# Patient Record
Sex: MALE | Race: WHITE | NOT HISPANIC OR LATINO | Employment: FULL TIME | URBAN - METROPOLITAN AREA
[De-identification: names, ages, dates, MRNs, and addresses within clinical notes are randomized per-mention and may not be internally consistent; named-entity substitution may affect disease eponyms.]

---

## 2017-10-31 ENCOUNTER — OFFICE VISIT (OUTPATIENT)
Dept: PRIMARY CARE CLINIC | Facility: CLINIC | Age: 32
End: 2017-10-31
Payer: COMMERCIAL

## 2017-10-31 VITALS
HEIGHT: 70 IN | SYSTOLIC BLOOD PRESSURE: 138 MMHG | DIASTOLIC BLOOD PRESSURE: 106 MMHG | BODY MASS INDEX: 44.06 KG/M2 | WEIGHT: 307.75 LBS | HEART RATE: 76 BPM | TEMPERATURE: 98 F

## 2017-10-31 DIAGNOSIS — R30.0 BURNING WITH URINATION: Primary | ICD-10-CM

## 2017-10-31 LAB
BILIRUB UR QL STRIP: NEGATIVE
CLARITY UR: CLEAR
COLOR UR: YELLOW
GLUCOSE UR QL STRIP: NEGATIVE
HGB UR QL STRIP: NEGATIVE
KETONES UR QL STRIP: NEGATIVE
LEUKOCYTE ESTERASE UR QL STRIP: NEGATIVE
NITRITE UR QL STRIP: NEGATIVE
PH UR STRIP: 7 [PH] (ref 5–8)
PROT UR QL STRIP: NEGATIVE
SP GR UR STRIP: 1.01 (ref 1–1.03)
URN SPEC COLLECT METH UR: NORMAL

## 2017-10-31 PROCEDURE — 99202 OFFICE O/P NEW SF 15 MIN: CPT | Mod: S$GLB,,, | Performed by: NURSE PRACTITIONER

## 2017-10-31 PROCEDURE — 87086 URINE CULTURE/COLONY COUNT: CPT

## 2017-10-31 PROCEDURE — 99999 PR PBB SHADOW E&M-NEW PATIENT-LVL IV: CPT | Mod: PBBFAC,,, | Performed by: NURSE PRACTITIONER

## 2017-10-31 PROCEDURE — 81003 URINALYSIS AUTO W/O SCOPE: CPT | Mod: PO

## 2017-10-31 RX ORDER — FLUTICASONE PROPIONATE 50 MCG
2 SPRAY, SUSPENSION (ML) NASAL DAILY
COMMUNITY
Start: 2017-06-29 | End: 2018-05-25

## 2017-10-31 RX ORDER — PHENAZOPYRIDINE HYDROCHLORIDE 200 MG/1
200 TABLET, FILM COATED ORAL 3 TIMES DAILY PRN
Qty: 12 TABLET | Refills: 0 | Status: SHIPPED | OUTPATIENT
Start: 2017-10-31 | End: 2017-11-10

## 2017-10-31 RX ORDER — LOVASTATIN 20 MG/1
20 TABLET ORAL DAILY
COMMUNITY
Start: 2017-06-30 | End: 2018-05-25

## 2017-10-31 RX ORDER — CETIRIZINE HYDROCHLORIDE 10 MG/1
10 TABLET ORAL DAILY
COMMUNITY
End: 2018-01-24

## 2017-10-31 RX ORDER — AZELASTINE 1 MG/ML
1 SPRAY, METERED NASAL 2 TIMES DAILY
COMMUNITY
Start: 2017-07-31 | End: 2018-05-25

## 2017-10-31 NOTE — MEDICAL/APP STUDENT
Subjective:       Patient ID: Karlos Clark is a 32 y.o. male.    Chief Complaint: Urinary Tract Infection (burning )    HPI     Presents to the clinic with c/o urinary burning, urgency, and frequency.  Sx began yesterday and have worsened since onset. Denies foul odor to urine, back pain, bladder pain or penile discharge.  Reports he is drinking plenty of water. He has not taken any medication for his current sx. Denies aggravating or alleviating factors.     Review of Systems   Constitutional: Negative for chills and fever.   HENT: Positive for congestion.    Respiratory: Negative for cough and shortness of breath.    Cardiovascular: Negative for chest pain and palpitations.   Gastrointestinal: Negative for abdominal pain, constipation, diarrhea, nausea and vomiting.   Genitourinary: Positive for dysuria, frequency and urgency. Negative for decreased urine volume, discharge, flank pain, hematuria, penile pain, penile swelling, scrotal swelling and testicular pain.   Skin: Negative for rash and wound.   Allergic/Immunologic: Positive for environmental allergies.   Neurological: Negative for dizziness, light-headedness and headaches.       Objective:      Physical Exam   Constitutional: He is oriented to person, place, and time. He appears well-developed and well-nourished. No distress.   HENT:   Head: Normocephalic and atraumatic.   Eyes: EOM are normal. Pupils are equal, round, and reactive to light. Right eye exhibits no discharge. Left eye exhibits no discharge.   Neck: Normal range of motion. Neck supple.   Cardiovascular: Normal rate and regular rhythm.    No murmur heard.  Pulmonary/Chest: Breath sounds normal. He has no wheezes. He has no rales.   Abdominal: Soft. Bowel sounds are normal. He exhibits no distension and no mass. There is tenderness (LLQ with deep palpation). There is no rebound and no guarding. No hernia.   Musculoskeletal: Normal range of motion.   Neurological: He is alert and oriented to  person, place, and time.   Skin: Skin is warm and dry.   Psychiatric: He has a normal mood and affect. His behavior is normal.       Assessment:       1. Urinary tract infection without hematuria, site unspecified    2. Burning with urination        Plan:         Burning with urination  -     URINALYSIS-- Negative   -     Urine culture; Pending  -    Rx:  phenazopyridine (PYRIDIUM) 200 MG tablet; Take 1 tablet (200 mg total) by mouth 3 (three) times daily as needed for Pain.   - Take pyridium for sx relief.   -  Increase fluid intake.  -  Call or RTC if sx do not improve or worsen in 3-5 days, may require Urology referral.

## 2017-10-31 NOTE — PATIENT INSTRUCTIONS
The urine does not show any infection.  We will call you with the results.    Increase fluids.  Take the pyridium for symptom relief.    If you have any questions, please call.  You can reach us at [phone] Tuesday through Friday (except holidays) 10 nooon to 6 p.m.    Thank you for using the [facility]!    Hawa Lui, APRN, CNP, FNP-BC  [facility]  [facility]      Dysuria with Uncertain Cause (Adult)    The urethra is the tube that allows urine to pass out of the body. In a woman, the urethra is the opening above the vagina. In men, the urethra is the opening on the tip of the penis. Dysuria is the feeling of pain or burning in the urethra when passing urine.  Dysuria can be caused by anything that irritates or inflames the urethra. An infection or chemical irritation can cause this reaction. A bladder infection is the most common cause of dysuria in adults. A urine test can diagnose this. A bladder infection needs antibiotic treatment.  Soaps, lotions, colognes and feminine hygiene products can cause dysuria. So can birth control jellies, creams, and foams. It will go away 1 to 3 days after using these irritants.  Sexually transmitted diseases (STDs) such as chlamydia or gonorrhea can cause dysuria. Your healthcare provider may take a culture sample. Your provider may start you on antibiotic medicine before the culture test returns.  In women who have gone through menopause, dysuria can be from dryness in the lining of the urethra. This can be treated with hormones. Dysuria becomes long-term (chronic) when it lasts for weeks or months. You may need to see a specialist (urologist) to diagnose and treat chronic dysuria.  Home care  These home care tips may help:  · Don't use any chemicals or products that you think may be causing your symptoms.  · If you were given a prescription medicine, take as directed. Be sure to take it until it is all used up.  · If a culture was taken, don't  have sex until you have been told that it is negative. This means you don't have an infection. Then follow your healthcare provider's advice to treat your condition.  If a culture was done and it is positive:  · Both you and your sexual partner may need to be treated. This is true even if your partner has no symptoms.  · Contact your healthcare provider or go to an urgent care clinic or the public health department to be looked at and treated.  · Don't have sex until both you and your partner(s) have finished all antibiotics and your healthcare provider says you are no longer contagious.  · Learn about and use safe sex practices. The safest sex is with a partner who has tested negative and only has sex with you. Condoms can prevent STDs from spreading, but they aren't a guarantee.  Follow-up care  Follow up with your healthcare provider, or as advised. If a culture was taken, you may call as directed for the results. If you have an STD, follow up with your provider or the public health department for a complete STD screening, including HIV testing. For more information, contact CDC-INFO at 381-641-8929.  When to seek medical advice  Call your healthcare provider right away if any of these occur:  · You aren't better after 3 days of treatment  · Fever of 100.4ºF (38ºC) or higher, or as directed by your healthcare provider  · Back or belly pain that gets worse  · You can't urinate because of pain  · New discharge from the urethra, vagina, or penis  · Painful sores on the penis  · Rash or joint pain  · Painful lumps (lymph nodes) in the groin  · Testicle pain or swelling of the scrotum  Date Last Reviewed: 11/1/2016  © 9475-9187 [employer]. [address] [place] [address], [place] [address]. All rights reserved. This information is not intended as a substitute for professional medical care. Always follow your healthcare professional's instructions.

## 2017-10-31 NOTE — PROGRESS NOTES
Subjective:       Patient ID: Karlos Clark is a 32 y.o. male.     Chief Complaint: Urinary Tract Infection (burning )     HPI      Presents to the clinic as a new patient to me with c/o urinary burning, urgency, and frequency.  Sx began yesterday and have worsened since onset. Denies foul odor to urine, back pain, bladder pain or penile discharge.  Reports he is drinking plenty of water. He has not taken any medication for his current sx. Denies aggravating or alleviating factors.      Review of Systems   Constitutional: Negative for chills and fever.   HENT: Positive for congestion.    Respiratory: Negative for cough and shortness of breath.    Cardiovascular: Negative for chest pain and palpitations.   Gastrointestinal: Negative for abdominal pain, constipation, diarrhea, nausea and vomiting.   Genitourinary: Positive for dysuria, frequency and urgency. Negative for decreased urine volume, discharge, flank pain, hematuria, penile pain, penile swelling, scrotal swelling and testicular pain.   Skin: Negative for rash and wound.   Allergic/Immunologic: Positive for environmental allergies.   Neurological: Negative for dizziness, light-headedness and headaches.       Objective:      Physical Exam   Constitutional: He is oriented to person, place, and time. He appears well-developed and well-nourished. No distress.   HENT:   Head: Normocephalic and atraumatic.   Eyes: EOM are normal. Pupils are equal, round, and reactive to light. Right eye exhibits no discharge. Left eye exhibits no discharge.   Neck: Normal range of motion. Neck supple.   Cardiovascular: Normal rate and regular rhythm.    No murmur heard.  Pulmonary/Chest: Breath sounds normal. He has no wheezes. He has no rales.   Abdominal: Soft. Bowel sounds are normal. He exhibits no distension and no mass. There is tenderness (LLQ with deep palpation). There is no rebound and no guarding. No hernia.   Musculoskeletal: Normal range of motion.   Neurological: He  "is alert and oriented to person, place, and time.   Skin: Skin is warm and dry.   Psychiatric: He has a normal mood and affect. His behavior is normal.     Burning with urination  -     URINALYSIS  -     Urine culture; Future; Expected date: 10/31/2017  -     phenazopyridine (PYRIDIUM) 200 MG tablet; Take 1 tablet (200 mg total) by mouth 3 (three) times daily as needed for Pain.  Dispense: 12 tablet; Refill: 0      Pt today presents with 2 days of dysuria, freq and urgency.  H/o several UTIs over the years and had bladder procedure ("stretched") at age 7 r/t difficulty holding his urine.  Not presently sexually active, states he uses condoms with every encounter.   No penile d/c or pain/swelling.    This is a new problem to me and the following work up is planned.    Lab & Radiological Tests Ordered: urinalysis.  The results are normal.  Urine culture results pending..    Pt advised to perform comfort measures recommended on patient instruction sheet .    If not better in 2-3 days, the patient is advised to call us.  If worse or concerns, the patient is advised to call us.  Explained exam findings, diagnosis and treatment plan to patient.  Questions answered and patient states understanding.      "

## 2017-11-01 LAB — BACTERIA UR CULT: NO GROWTH

## 2017-11-29 ENCOUNTER — OFFICE VISIT (OUTPATIENT)
Dept: FAMILY MEDICINE | Facility: CLINIC | Age: 32
End: 2017-11-29
Payer: COMMERCIAL

## 2017-11-29 VITALS
BODY MASS INDEX: 45.65 KG/M2 | DIASTOLIC BLOOD PRESSURE: 78 MMHG | WEIGHT: 315 LBS | SYSTOLIC BLOOD PRESSURE: 140 MMHG | TEMPERATURE: 98 F | HEART RATE: 103 BPM | OXYGEN SATURATION: 97 %

## 2017-11-29 DIAGNOSIS — J32.0 CHRONIC MAXILLARY SINUSITIS: Primary | ICD-10-CM

## 2017-11-29 PROCEDURE — 99213 OFFICE O/P EST LOW 20 MIN: CPT | Mod: S$GLB,,, | Performed by: PHYSICIAN ASSISTANT

## 2017-11-29 PROCEDURE — 99999 PR PBB SHADOW E&M-EST. PATIENT-LVL III: CPT | Mod: PBBFAC,,, | Performed by: PHYSICIAN ASSISTANT

## 2017-11-29 RX ORDER — METHYLPREDNISOLONE 4 MG/1
TABLET ORAL
Qty: 1 PACKAGE | Refills: 0 | Status: SHIPPED | OUTPATIENT
Start: 2017-11-29 | End: 2017-12-13 | Stop reason: ALTCHOICE

## 2017-11-29 RX ORDER — CEFDINIR 300 MG/1
300 CAPSULE ORAL 2 TIMES DAILY
Qty: 20 CAPSULE | Refills: 0 | Status: SHIPPED | OUTPATIENT
Start: 2017-11-29 | End: 2017-12-09

## 2017-11-29 NOTE — PROGRESS NOTES
Subjective:       Patient ID: Karlos Clark is a 32 y.o. male.    Chief Complaint: Sore Throat (x 3 days no fever )    HPI   Completed Augmentin x 1 wk (2 wks ago)  Hx recurrent sinusitis  All previous CT's of sinuses confirms sinusitis  Current sinus sx x 5 or 6 wks  Review of Systems   Constitutional: Negative for activity change, appetite change, chills, diaphoresis, fatigue, fever and unexpected weight change.   HENT: Positive for congestion, postnasal drip, sinus pain, sinus pressure and sore throat.    Eyes: Negative.    Respiratory: Positive for cough. Negative for shortness of breath and wheezing.    Cardiovascular: Negative.  Negative for chest pain and leg swelling.   Gastrointestinal: Negative.    Endocrine: Negative.    Genitourinary: Negative.    Musculoskeletal: Negative.    Skin: Negative.  Negative for rash.       Objective:      Physical Exam   Constitutional: He appears well-developed and well-nourished. No distress.   HENT:   Head: Normocephalic and atraumatic.   Right Ear: External ear normal.   Left Ear: External ear normal.   Nose: Nose normal.   Mouth/Throat: Oropharynx is clear and moist. No oropharyngeal exudate.   R max sinus fullness and tenderness  Mucus clear but nasal membranes inflamed    Eyes: Conjunctivae are normal. No scleral icterus.   Neck: Normal range of motion. Neck supple. No tracheal deviation present. No thyromegaly present.   Cardiovascular: Normal rate, regular rhythm, normal heart sounds and intact distal pulses.  Exam reveals no gallop and no friction rub.    No murmur heard.  Pulmonary/Chest: Effort normal and breath sounds normal. No respiratory distress. He has no wheezes. He has no rales.   Musculoskeletal: He exhibits no edema.   Lymphadenopathy:     He has no cervical adenopathy.   Skin: Skin is warm and dry. No rash noted.   Vitals reviewed.      Assessment:       1. Chronic maxillary sinusitis        Plan:       Karlos was seen today for sore  throat.    Diagnoses and all orders for this visit:    Chronic maxillary sinusitis  -     cefdinir (OMNICEF) 300 MG capsule; Take 1 capsule (300 mg total) by mouth 2 (two) times daily.  -     methylPREDNISolone (MEDROL DOSEPACK) 4 mg tablet; use as directed    discussed otc's

## 2017-12-13 ENCOUNTER — LAB VISIT (OUTPATIENT)
Dept: LAB | Facility: HOSPITAL | Age: 32
End: 2017-12-13
Attending: ALLERGY & IMMUNOLOGY
Payer: COMMERCIAL

## 2017-12-13 ENCOUNTER — OFFICE VISIT (OUTPATIENT)
Dept: ALLERGY | Facility: CLINIC | Age: 32
End: 2017-12-13
Payer: COMMERCIAL

## 2017-12-13 VITALS — HEIGHT: 70 IN | BODY MASS INDEX: 45.07 KG/M2 | OXYGEN SATURATION: 98 % | WEIGHT: 314.81 LBS | HEART RATE: 100 BPM

## 2017-12-13 DIAGNOSIS — L30.9 DERMATITIS: ICD-10-CM

## 2017-12-13 DIAGNOSIS — J31.0 OTHER CHRONIC RHINITIS: ICD-10-CM

## 2017-12-13 DIAGNOSIS — H10.423 SIMPLE CHRONIC CONJUNCTIVITIS OF BOTH EYES: ICD-10-CM

## 2017-12-13 DIAGNOSIS — J32.9 RECURRENT SINUS INFECTIONS: ICD-10-CM

## 2017-12-13 DIAGNOSIS — J11.1 FLU SYNDROME: Primary | ICD-10-CM

## 2017-12-13 DIAGNOSIS — J31.0 OTHER CHRONIC RHINITIS: Primary | ICD-10-CM

## 2017-12-13 LAB
IGA SERPL-MCNC: 183 MG/DL
IGG SERPL-MCNC: 907 MG/DL
IGM SERPL-MCNC: 68 MG/DL

## 2017-12-13 PROCEDURE — 82787 IGG 1 2 3 OR 4 EACH: CPT | Mod: 59

## 2017-12-13 PROCEDURE — 82784 ASSAY IGA/IGD/IGG/IGM EACH: CPT | Mod: 59

## 2017-12-13 PROCEDURE — 99203 OFFICE O/P NEW LOW 30 MIN: CPT | Mod: S$GLB,,, | Performed by: ALLERGY & IMMUNOLOGY

## 2017-12-13 PROCEDURE — 82785 ASSAY OF IGE: CPT

## 2017-12-13 PROCEDURE — 82784 ASSAY IGA/IGD/IGG/IGM EACH: CPT

## 2017-12-13 PROCEDURE — 99999 PR PBB SHADOW E&M-EST. PATIENT-LVL III: CPT | Mod: PBBFAC,,, | Performed by: ALLERGY & IMMUNOLOGY

## 2017-12-13 PROCEDURE — 36415 COLL VENOUS BLD VENIPUNCTURE: CPT | Mod: PO

## 2017-12-13 PROCEDURE — 86003 ALLG SPEC IGE CRUDE XTRC EA: CPT | Mod: 59

## 2017-12-13 PROCEDURE — 86003 ALLG SPEC IGE CRUDE XTRC EA: CPT

## 2017-12-13 PROCEDURE — 86317 IMMUNOASSAY INFECTIOUS AGENT: CPT | Mod: 59

## 2017-12-13 NOTE — PROGRESS NOTES
Subjective:       Patient ID: Karlos Clark is a 32 y.o. male.    Chief Complaint:  Allergies (sinus, allergies)      31 yo man presents for new patient evaluation of possible allergies. He has chronic sinus issues, had recent CT with Maxillary sinus disease bilaterally but predominantly on the left side with a mucous retention cyst vs. polyp along the floor the left maxillary sinus.  He sees ENT but has been told is allergy related. Has had allergy test in past and inconclusive because does not react right away is later or next day. He does have congestion all the time. Will has sneeze and runny nose often. Nose feels like it is swollen inside. Worse if over heated. Worse in spring and fall. He has itchy nose and eyes and dark circles under eyes as well. No triggers identified. He does take Claritin every AM, Flonase 2 SEN in evening and Astelin 1 SEN BID and does keep controlled. Has flares and sinus infections maybe 3-4 times per year. No bronchitis or pneumonia. No asthma. He gets redness and dry around mouth and wonders if gluten allergy. He notices with corn ingestion he gets acne. occ dairy causes abdominal pain but no other food allergies. no insect or latex allergy. No other medical issues.         Environmental History: see history section for home environment  Review of Systems   Constitutional: Negative for activity change, appetite change, chills, fatigue, fever and unexpected weight change.   HENT: Positive for congestion, postnasal drip, rhinorrhea, sinus pressure and sneezing. Negative for ear discharge, ear pain, facial swelling, hearing loss, mouth sores, nosebleeds, sore throat, tinnitus, trouble swallowing and voice change.    Eyes: Positive for discharge and itching. Negative for redness and visual disturbance.   Respiratory: Negative for cough, chest tightness, shortness of breath and wheezing.    Cardiovascular: Negative for chest pain, palpitations and leg swelling.   Gastrointestinal:  Negative for abdominal distention, abdominal pain, constipation, diarrhea, nausea and vomiting.   Genitourinary: Negative for difficulty urinating.   Musculoskeletal: Negative for arthralgias, back pain, joint swelling and myalgias.   Skin: Positive for rash. Negative for color change and pallor.   Neurological: Negative for dizziness, tremors, speech difficulty, weakness, light-headedness and headaches.   Hematological: Negative for adenopathy. Does not bruise/bleed easily.   Psychiatric/Behavioral: Negative for agitation, confusion, decreased concentration and sleep disturbance. The patient is not nervous/anxious.         Objective:    Physical Exam   Constitutional: He is oriented to person, place, and time. He appears well-developed and well-nourished. No distress.   HENT:   Head: Normocephalic and atraumatic.   Right Ear: Hearing, tympanic membrane, external ear and ear canal normal.   Left Ear: Hearing, tympanic membrane, external ear and ear canal normal.   Nose: No mucosal edema (pink turbinates), rhinorrhea, sinus tenderness or septal deviation. No epistaxis.   Mouth/Throat: Oropharynx is clear and moist and mucous membranes are normal. No uvula swelling.   Eyes: Conjunctivae are normal. Right eye exhibits no discharge. Left eye exhibits no discharge.   Neck: Normal range of motion. No thyromegaly present.   Cardiovascular: Normal rate, regular rhythm and normal heart sounds.    No murmur heard.  Pulmonary/Chest: Effort normal and breath sounds normal. No respiratory distress. He has no wheezes.   Abdominal: Soft. He exhibits no distension. There is no tenderness.   Musculoskeletal: Normal range of motion. He exhibits no edema.   Lymphadenopathy:     He has no cervical adenopathy.   Neurological: He is alert and oriented to person, place, and time. Coordination normal.   Skin: Skin is warm and dry. No rash noted. No erythema.   Psychiatric: He has a normal mood and affect. His behavior is normal. Judgment  and thought content normal.   Nursing note and vitals reviewed.      Laboratory:   none performed   Assessment:       1. Other chronic rhinitis    2. Simple chronic conjunctivitis of both eyes    3. Recurrent sinus infections         Plan:       1. As had negative skin test in past will send immunocaps, discussed possible IgE mediated allergy vs non allergic rhinitis  2. continue fluticasone 2 SEN daily, loratadine 10 mg daily and azelastine 1 SEN BID  3. Phone review

## 2017-12-14 LAB — IGE SERPL-ACNC: <35 IU/ML

## 2017-12-15 LAB
A ALTERNATA IGE QN: <0.35 KU/L
A FUMIGATUS IGE QN: <0.35 KU/L
ALLERGEN MAPLE/SYCAMORE IGE: <0.35 KU/L
ALLERGEN PENICILLIUM IGE: <0.35 KU/L
ALLERGEN RYE IGE: <0.35 KU/L
ALLERGEN WALNUT TREE IGE: <0.35 KU/L
ALLERGEN WHEAT IGE: <0.35 KU/L
ALLERGEN WHITE PINE TREE IGE: <0.35 KU/L
ALLERGEN WILLOW IGE: <0.35 KU/L
B CINEREA IGE QN: <0.35 KU/L
BAHIA GRASS IGE QN: <0.35 KU/L
BALD CYPRESS IGE QN: <0.35 KU/L
BERMUDA GRASS IGE QN: <0.35 KU/L
C GLOBOSUM IGE QN: <0.35 KU/L
C HERBARUM IGE QN: <0.35 KU/L
C LUNATA IGE QN: <0.35 KU/L
CAT DANDER IGE QN: <0.35 KU/L
COMMON RAGWEED IGE QN: <0.35 KU/L
CORN IGE QN: <0.35 KU/L
COTTONWOOD IGE QN: <0.35 KU/L
D FARINAE IGE QN: <0.35 KU/L
D PTERONYSS IGE QN: <0.35 KU/L
DEPRECATED A ALTERNATA IGE RAST QL: NORMAL
DEPRECATED A FUMIGATUS IGE RAST QL: NORMAL
DEPRECATED B CINEREA IGE RAST QL: NORMAL
DEPRECATED BAHIA GRASS IGE RAST QL: NORMAL
DEPRECATED BALD CYPRESS IGE RAST QL: NORMAL
DEPRECATED BERMUDA GRASS IGE RAST QL: NORMAL
DEPRECATED C GLOBOSUM IGE RAST QL: NORMAL
DEPRECATED C HERBARUM IGE RAST QL: NORMAL
DEPRECATED C LUNATA IGE RAST QL: NORMAL
DEPRECATED CAT DANDER IGE RAST QL: NORMAL
DEPRECATED COMMON RAGWEED IGE RAST QL: NORMAL
DEPRECATED CORN IGE RAST QL: NORMAL
DEPRECATED COTTONWOOD IGE RAST QL: NORMAL
DEPRECATED D FARINAE IGE RAST QL: NORMAL
DEPRECATED D PTERONYSS IGE RAST QL: NORMAL
DEPRECATED DOG DANDER IGE RAST QL: NORMAL
DEPRECATED ENGL PLANTAIN IGE RAST QL: NORMAL
DEPRECATED GLUTEN IGE RAST QL: NORMAL
DEPRECATED HORSE DANDER IGE RAST QL: NORMAL
DEPRECATED JOHNSON GRASS IGE RAST QL: NORMAL
DEPRECATED MARSH ELDER IGE RAST QL: NORMAL
DEPRECATED MUGWORT IGE RAST QL: NORMAL
DEPRECATED OAT IGE RAST QL: NORMAL
DEPRECATED P BETAE IGE RAST QL: NORMAL
DEPRECATED PECAN/HICK TREE IGE RAST QL: NORMAL
DEPRECATED RICE IGE RAST QL: NORMAL
DEPRECATED ROACH IGE RAST QL: NORMAL
DEPRECATED S ROSTRATA IGE RAST QL: NORMAL
DEPRECATED SALTWORT IGE RAST QL: NORMAL
DEPRECATED SILVER BIRCH IGE RAST QL: NORMAL
DEPRECATED TIMOTHY IGE RAST QL: NORMAL
DEPRECATED WHITE OAK IGE RAST QL: NORMAL
DOG DANDER IGE QN: <0.35 KU/L
ENGL PLANTAIN IGE QN: <0.35 KU/L
GLUTEN IGE QN: <0.35 KU/L
HORSE DANDER IGE QN: <0.35 KU/L
IGG1 SER-MCNC: 504 MG/DL
IGG2 SER-MCNC: 220 MG/DL
IGG3 SER-MCNC: 29 MG/DL
IGG4 SER-MCNC: 30 MG/DL
JOHNSON GRASS IGE QN: <0.35 KU/L
MAPLE/SYCAMORE CLASS: NORMAL
MARSH ELDER IGE QN: <0.35 KU/L
MUGWORT IGE QN: <0.35 KU/L
OAT IGE QN: <0.35 KU/L
P BETAE IGE QN: <0.35 KU/L
PECAN/HICK TREE IGE QN: <0.35 KU/L
PENICILLIUM CLASS: NORMAL
RAGWEED, WESTERN IGE: <0.35 KU/L
RAGWEED, WESTERN, CLASS: NORMAL
RICE IGE QN: <0.35 KU/L
ROACH IGE QN: <0.35 KU/L
RYE CLASS: NORMAL
S ROSTRATA IGE QN: <0.35 KU/L
SALTWORT IGE QN: <0.35 KU/L
SILVER BIRCH IGE QN: <0.35 KU/L
TIMOTHY IGE QN: <0.35 KU/L
WALNUT TREE CLASS: NORMAL
WHEAT CLASS: NORMAL
WHITE OAK IGE QN: <0.35 KU/L
WHITE PINE CLASS: NORMAL
WILLOW CLASS: NORMAL

## 2017-12-20 LAB
C DIPHTHERIAE AB SER IA-ACNC: 0.22 IU/ML
C TETANI AB SER-ACNC: 1.01 IU/ML
DEPRECATED S PNEUM 1 IGG SER-MCNC: 1.3 MCG/ML
DEPRECATED S PNEUM12 IGG SER-MCNC: <0.3 MCG/ML
DEPRECATED S PNEUM14 IGG SER-MCNC: 0.3 MCG/ML
DEPRECATED S PNEUM19 IGG SER-MCNC: 0.9 MCG/ML
DEPRECATED S PNEUM23 IGG SER-MCNC: 0.5 MCG/ML
DEPRECATED S PNEUM3 IGG SER-MCNC: 0.7 MCG/ML
DEPRECATED S PNEUM4 IGG SER-MCNC: <0.3 MCG/ML
DEPRECATED S PNEUM5 IGG SER-MCNC: 0.9 MCG/ML
DEPRECATED S PNEUM8 IGG SER-MCNC: <0.3 MCG/ML
DEPRECATED S PNEUM9 IGG SER-MCNC: <0.3 MCG/ML
HAEM INFLU B IGG SER-MCNC: <0.11 MG/L
S PNEUM DA 18C IGG SER-MCNC: 0.5 MCG/ML
S PNEUM DA 6B IGG SER-MCNC: 0.4 MCG/ML
S PNEUM DA 7F IGG SER-MCNC: 0.3 MCG/ML
S PNEUM DA 9V IGG SER-MCNC: 0.4 MCG/ML

## 2017-12-21 ENCOUNTER — TELEPHONE (OUTPATIENT)
Dept: ALLERGY | Facility: CLINIC | Age: 32
End: 2017-12-21

## 2017-12-21 NOTE — TELEPHONE ENCOUNTER
"Spoke to pt, told him Dr Reid said:     "Please let patient know all allergy tests were negative.  Immune system tests are good except not adequately protected against strep pneumoniae.  Recommend pneumovax and repeat labs."     Pt expressed understanding.     "

## 2017-12-27 ENCOUNTER — CLINICAL SUPPORT (OUTPATIENT)
Dept: ALLERGY | Facility: CLINIC | Age: 32
End: 2017-12-27
Payer: COMMERCIAL

## 2017-12-27 DIAGNOSIS — J32.9 RECURRENT SINUS INFECTIONS: Primary | ICD-10-CM

## 2017-12-27 PROCEDURE — 90471 IMMUNIZATION ADMIN: CPT | Mod: S$GLB,,, | Performed by: INTERNAL MEDICINE

## 2017-12-27 PROCEDURE — 90732 PPSV23 VACC 2 YRS+ SUBQ/IM: CPT | Mod: S$GLB,,, | Performed by: INTERNAL MEDICINE

## 2017-12-28 ENCOUNTER — LAB VISIT (OUTPATIENT)
Dept: LAB | Facility: HOSPITAL | Age: 32
End: 2017-12-28
Attending: PHYSICIAN ASSISTANT
Payer: COMMERCIAL

## 2017-12-28 DIAGNOSIS — J11.1 FLU SYNDROME: ICD-10-CM

## 2017-12-28 LAB
FLUAV AG SPEC QL IA: NEGATIVE
FLUBV AG SPEC QL IA: NEGATIVE
SPECIMEN SOURCE: NORMAL

## 2017-12-28 PROCEDURE — 87400 INFLUENZA A/B EACH AG IA: CPT | Mod: PO

## 2018-01-02 ENCOUNTER — OFFICE VISIT (OUTPATIENT)
Dept: FAMILY MEDICINE | Facility: CLINIC | Age: 33
End: 2018-01-02
Payer: COMMERCIAL

## 2018-01-02 VITALS
HEART RATE: 95 BPM | WEIGHT: 315 LBS | BODY MASS INDEX: 46.06 KG/M2 | TEMPERATURE: 99 F | OXYGEN SATURATION: 97 % | SYSTOLIC BLOOD PRESSURE: 134 MMHG | DIASTOLIC BLOOD PRESSURE: 82 MMHG

## 2018-01-02 DIAGNOSIS — J40 BRONCHITIS: Primary | ICD-10-CM

## 2018-01-02 DIAGNOSIS — J01.00 ACUTE MAXILLARY SINUSITIS, RECURRENCE NOT SPECIFIED: ICD-10-CM

## 2018-01-02 PROCEDURE — 99214 OFFICE O/P EST MOD 30 MIN: CPT | Mod: S$GLB,,, | Performed by: PHYSICIAN ASSISTANT

## 2018-01-02 PROCEDURE — 94640 AIRWAY INHALATION TREATMENT: CPT | Mod: S$GLB,,, | Performed by: FAMILY MEDICINE

## 2018-01-02 PROCEDURE — 99999 PR PBB SHADOW E&M-EST. PATIENT-LVL IV: CPT | Mod: PBBFAC,,, | Performed by: PHYSICIAN ASSISTANT

## 2018-01-02 RX ORDER — DOXYCYCLINE 100 MG/1
100 CAPSULE ORAL 2 TIMES DAILY
Qty: 20 CAPSULE | Refills: 0 | Status: SHIPPED | OUTPATIENT
Start: 2018-01-02 | End: 2018-01-12

## 2018-01-02 RX ORDER — METHYLPREDNISOLONE 4 MG/1
TABLET ORAL
COMMUNITY
Start: 2017-12-31 | End: 2018-01-24

## 2018-01-02 RX ORDER — HYDROCODONE POLISTIREX AND CHLORPHENIRAMINE POLISTIREX 10; 8 MG/5ML; MG/5ML
5 SUSPENSION, EXTENDED RELEASE ORAL
COMMUNITY
Start: 2017-12-30 | End: 2018-01-24

## 2018-01-02 RX ORDER — ALBUTEROL SULFATE 90 UG/1
2 AEROSOL, METERED RESPIRATORY (INHALATION) EVERY 4 HOURS PRN
Qty: 1 INHALER | Refills: 5 | Status: SHIPPED | OUTPATIENT
Start: 2018-01-02 | End: 2018-05-25

## 2018-01-02 RX ORDER — ALBUTEROL SULFATE 0.83 MG/ML
2.5 SOLUTION RESPIRATORY (INHALATION)
Status: COMPLETED | OUTPATIENT
Start: 2018-01-02 | End: 2018-01-02

## 2018-01-02 RX ADMIN — ALBUTEROL SULFATE 2.5 MG: 0.83 SOLUTION RESPIRATORY (INHALATION) at 09:01

## 2018-01-02 NOTE — PROGRESS NOTES
Subjective:       Patient ID: Karlos Clark is a 32 y.o. male.    Chief Complaint: Cough and Chest Congestion    HPI   Worsening cough and wheeze  Sx x 2 wks  Review of Systems   Constitutional: Positive for activity change and chills. Negative for appetite change, diaphoresis, fatigue, fever and unexpected weight change.   HENT: Positive for congestion, postnasal drip and sinus pressure. Negative for sinus pain and sore throat.    Eyes: Negative.    Respiratory: Positive for cough and wheezing.    Cardiovascular: Negative.  Negative for chest pain and leg swelling.   Gastrointestinal: Negative.    Endocrine: Negative.    Genitourinary: Negative.    Musculoskeletal: Negative.    Skin: Negative.  Negative for rash.       Objective:      Physical Exam   Constitutional: He appears well-developed and well-nourished. No distress.   HENT:   Head: Normocephalic and atraumatic.   Right Ear: External ear normal.   Left Ear: External ear normal.   Nose: Nose normal.   Mouth/Throat: Oropharynx is clear and moist. No oropharyngeal exudate.   Max sinus tenderness  Cloudy mucus   Eyes: Conjunctivae are normal. No scleral icterus.   Neck: Normal range of motion. Neck supple. No tracheal deviation present. No thyromegaly present.   Cardiovascular: Normal rate, regular rhythm, normal heart sounds and intact distal pulses.  Exam reveals no gallop and no friction rub.    No murmur heard.  Pulmonary/Chest: Effort normal. No respiratory distress. He has wheezes. He has no rales.   Occasional faint wheeze   Musculoskeletal: He exhibits no edema.   Lymphadenopathy:     He has no cervical adenopathy.   Skin: Skin is warm and dry. No rash noted.   Vitals reviewed.      Assessment:       1. Bronchitis    2. Acute maxillary sinusitis, recurrence not specified        Plan:       Karlos was seen today for cough and chest congestion.    Diagnoses and all orders for this visit:    Bronchitis  -     doxycycline (MONODOX) 100 MG capsule; Take 1  capsule (100 mg total) by mouth 2 (two) times daily.  -     albuterol 90 mcg/actuation inhaler; Inhale 2 puffs into the lungs every 4 (four) hours as needed for Wheezing.  -     albuterol nebulizer solution 2.5 mg; Take 3 mLs (2.5 mg total) by nebulization one time.    Acute maxillary sinusitis, recurrence not specified  -     doxycycline (MONODOX) 100 MG capsule; Take 1 capsule (100 mg total) by mouth 2 (two) times daily.    discussed otc's    Pt improved and felt better after neb Rx

## 2018-01-24 ENCOUNTER — HOSPITAL ENCOUNTER (OUTPATIENT)
Dept: RADIOLOGY | Facility: HOSPITAL | Age: 33
Discharge: HOME | End: 2018-01-24
Attending: NURSE PRACTITIONER
Payer: COMMERCIAL

## 2018-01-24 ENCOUNTER — OFFICE VISIT (OUTPATIENT)
Dept: PRIMARY CARE CLINIC | Facility: CLINIC | Age: 33
End: 2018-01-24
Payer: COMMERCIAL

## 2018-01-24 VITALS
SYSTOLIC BLOOD PRESSURE: 130 MMHG | OXYGEN SATURATION: 98 % | TEMPERATURE: 98 F | WEIGHT: 315 LBS | HEIGHT: 70 IN | DIASTOLIC BLOOD PRESSURE: 84 MMHG | HEART RATE: 70 BPM | BODY MASS INDEX: 45.1 KG/M2

## 2018-01-24 DIAGNOSIS — R05.9 COUGH: ICD-10-CM

## 2018-01-24 DIAGNOSIS — J40 BRONCHITIS: Primary | ICD-10-CM

## 2018-01-24 DIAGNOSIS — R05.9 COUGH: Primary | ICD-10-CM

## 2018-01-24 DIAGNOSIS — R09.81 NASAL CONGESTION: ICD-10-CM

## 2018-01-24 PROCEDURE — 99999 PR PBB SHADOW E&M-EST. PATIENT-LVL IV: CPT | Mod: PBBFAC,,, | Performed by: NURSE PRACTITIONER

## 2018-01-24 PROCEDURE — 71046 X-RAY EXAM CHEST 2 VIEWS: CPT | Mod: 26,,, | Performed by: RADIOLOGY

## 2018-01-24 PROCEDURE — 99214 OFFICE O/P EST MOD 30 MIN: CPT | Mod: S$GLB,,, | Performed by: NURSE PRACTITIONER

## 2018-01-24 PROCEDURE — 71046 X-RAY EXAM CHEST 2 VIEWS: CPT | Mod: TC,FY,PO

## 2018-01-24 RX ORDER — DESLORATADINE 5 MG/1
5 TABLET ORAL
COMMUNITY
Start: 2018-01-15 | End: 2018-02-26

## 2018-01-24 RX ORDER — PANTOPRAZOLE SODIUM 40 MG/1
40 TABLET, DELAYED RELEASE ORAL
COMMUNITY
Start: 2018-01-15 | End: 2018-10-03 | Stop reason: SDUPTHER

## 2018-01-24 NOTE — PROGRESS NOTES
Subjective:       Patient ID: Karlos Clark is a 32 y.o. male.    Chief Complaint: Sore Throat (started yesterday) and Cough (started romeo)    Patient who is new to me presents with c/o cough, sore throat, and congestion. He has been having the cough since x-mas. He was initially seen and diagnosed with a sinus infection and given augmentin, decadron injection, and medrol dose pack. He did have some improvement in symptoms after medications. Congestion has improved. Patient does endorse some early morning cloudy mucus, but throughout the day is clear. He did see his PCP who gave patient protonix for GERD symptoms.       Sore Throat    This is a new problem. The current episode started yesterday. The problem has been gradually worsening. The pain is worse on the left side. There has been no fever. The patient is experiencing no pain. Associated symptoms include congestion and coughing. Pertinent negatives include no abdominal pain, diarrhea, drooling, ear discharge, ear pain, headaches, hoarse voice, plugged ear sensation, neck pain, shortness of breath, stridor, swollen glands, trouble swallowing or vomiting. He has had no exposure to strep or mono. He has tried gargles (chloreseptic cough drop) for the symptoms. The treatment provided mild relief.   Cough   This is a new problem. The current episode started more than 1 month ago. The problem has been unchanged. The cough is non-productive. Associated symptoms include nasal congestion, postnasal drip, rhinorrhea and a sore throat. Pertinent negatives include no chest pain, chills, ear congestion, ear pain, fever, headaches, shortness of breath or wheezing. He has tried oral steroids and steroid inhaler for the symptoms. The treatment provided mild relief. His past medical history is significant for bronchitis and environmental allergies. There is no history of asthma, COPD or pneumonia.     Review of Systems   Constitutional: Negative for chills, fatigue and  fever.   HENT: Positive for congestion, postnasal drip, rhinorrhea and sore throat. Negative for drooling, ear discharge, ear pain, hoarse voice, sinus pressure and trouble swallowing.    Respiratory: Positive for cough. Negative for shortness of breath, wheezing and stridor.    Cardiovascular: Negative for chest pain and leg swelling.   Gastrointestinal: Negative for abdominal distention, abdominal pain, diarrhea and vomiting.   Genitourinary: Negative for dysuria and flank pain.   Musculoskeletal: Negative for neck pain.   Skin: Negative for color change and pallor.   Allergic/Immunologic: Positive for environmental allergies.   Neurological: Negative for light-headedness, numbness and headaches.       Objective:      Physical Exam   Constitutional: He is oriented to person, place, and time. Vital signs are normal. He appears well-developed and well-nourished. He is cooperative.   HENT:   Head: Normocephalic and atraumatic.   Right Ear: Hearing, tympanic membrane, external ear and ear canal normal.   Left Ear: Hearing, tympanic membrane, external ear and ear canal normal.   Nose: Mucosal edema present. Right sinus exhibits no maxillary sinus tenderness and no frontal sinus tenderness. Left sinus exhibits no maxillary sinus tenderness and no frontal sinus tenderness.   Mouth/Throat: Posterior oropharyngeal erythema present. No oropharyngeal exudate.   Eyes: Conjunctivae and EOM are normal. Pupils are equal, round, and reactive to light.   Neck: Normal range of motion. Neck supple.   Cardiovascular: Normal rate and regular rhythm.    Pulmonary/Chest: Effort normal and breath sounds normal. No tachypnea. No respiratory distress.   Abdominal: Soft. Bowel sounds are normal.   Musculoskeletal: Normal range of motion.   Lymphadenopathy:        Head (right side): No submental, no submandibular, no tonsillar, no preauricular, no posterior auricular and no occipital adenopathy present.        Head (left side): No submental,  no submandibular, no tonsillar, no preauricular, no posterior auricular and no occipital adenopathy present.     He has no cervical adenopathy.   Neurological: He is alert and oriented to person, place, and time.   Skin: Skin is warm and dry.   Nursing note and vitals reviewed.      Assessment:       1. Bronchitis    2. Cough    3. Nasal congestion        Plan:         Bronchitis    Cough    Nasal congestion    Chest xray negative for pneumonia. Patient educated that cough may be related to GERD and/or post infectious cough. Patient to increase fluids and rest. If symptoms do not improve will consider adding singular and another round of oral steroids as this may be related to  environmental allergies. Patient to follow up if no improvement or worsening symptoms.

## 2018-01-26 DIAGNOSIS — J32.9 RECURRENT SINUS INFECTIONS: Primary | ICD-10-CM

## 2018-01-26 RX ORDER — PREDNISONE 10 MG/1
TABLET ORAL
Qty: 12 TABLET | Refills: 0 | Status: SHIPPED | OUTPATIENT
Start: 2018-01-26 | End: 2018-02-26

## 2018-02-02 ENCOUNTER — LAB VISIT (OUTPATIENT)
Dept: LAB | Facility: HOSPITAL | Age: 33
End: 2018-02-02
Attending: ALLERGY & IMMUNOLOGY
Payer: COMMERCIAL

## 2018-02-02 DIAGNOSIS — J32.9 RECURRENT SINUS INFECTIONS: ICD-10-CM

## 2018-02-02 LAB — IGG SERPL-MCNC: 919 MG/DL

## 2018-02-02 PROCEDURE — 86774 TETANUS ANTIBODY: CPT

## 2018-02-02 PROCEDURE — 82784 ASSAY IGA/IGD/IGG/IGM EACH: CPT

## 2018-02-02 PROCEDURE — 82787 IGG 1 2 3 OR 4 EACH: CPT

## 2018-02-06 LAB
IGG1 SER-MCNC: 539 MG/DL
IGG2 SER-MCNC: 285 MG/DL
IGG3 SER-MCNC: 32 MG/DL
IGG4 SER-MCNC: 32 MG/DL

## 2018-02-14 LAB
C DIPHTHERIAE AB SER IA-ACNC: 0.17 IU/ML
C TETANI AB SER-ACNC: 1.02 IU/ML
DEPRECATED S PNEUM 1 IGG SER-MCNC: 13 MCG/ML
DEPRECATED S PNEUM12 IGG SER-MCNC: 13.6 MCG/ML
DEPRECATED S PNEUM14 IGG SER-MCNC: 1.2 MCG/ML
DEPRECATED S PNEUM19 IGG SER-MCNC: 11.9 MCG/ML
DEPRECATED S PNEUM23 IGG SER-MCNC: >48 MCG/ML
DEPRECATED S PNEUM3 IGG SER-MCNC: 5.9 MCG/ML
DEPRECATED S PNEUM4 IGG SER-MCNC: 0.5 MCG/ML
DEPRECATED S PNEUM5 IGG SER-MCNC: 34.2 MCG/ML
DEPRECATED S PNEUM8 IGG SER-MCNC: 12 MCG/ML
DEPRECATED S PNEUM9 IGG SER-MCNC: 2.8 MCG/ML
HAEM INFLU B IGG SER-MCNC: <0.11 MG/L
S PNEUM DA 18C IGG SER-MCNC: 26.6 MCG/ML
S PNEUM DA 6B IGG SER-MCNC: 8.8 MCG/ML
S PNEUM DA 7F IGG SER-MCNC: 1.4 MCG/ML
S PNEUM DA 9V IGG SER-MCNC: 2.2 MCG/ML

## 2018-02-26 ENCOUNTER — OFFICE VISIT (OUTPATIENT)
Dept: FAMILY MEDICINE | Facility: CLINIC | Age: 33
End: 2018-02-26
Payer: COMMERCIAL

## 2018-02-26 VITALS
HEIGHT: 70 IN | WEIGHT: 315 LBS | BODY MASS INDEX: 45.1 KG/M2 | RESPIRATION RATE: 97 BRPM | HEART RATE: 84 BPM | DIASTOLIC BLOOD PRESSURE: 82 MMHG | SYSTOLIC BLOOD PRESSURE: 138 MMHG

## 2018-02-26 DIAGNOSIS — J30.89 CHRONIC NONSEASONAL ALLERGIC RHINITIS DUE TO POLLEN: ICD-10-CM

## 2018-02-26 DIAGNOSIS — E78.5 HYPERLIPIDEMIA, UNSPECIFIED HYPERLIPIDEMIA TYPE: ICD-10-CM

## 2018-02-26 DIAGNOSIS — I10 HYPERTENSION, UNSPECIFIED TYPE: Primary | Chronic | ICD-10-CM

## 2018-02-26 PROCEDURE — 3008F BODY MASS INDEX DOCD: CPT | Mod: S$GLB,,,

## 2018-02-26 PROCEDURE — 99999 PR PBB SHADOW E&M-EST. PATIENT-LVL III: CPT | Mod: PBBFAC,,,

## 2018-02-26 PROCEDURE — 99214 OFFICE O/P EST MOD 30 MIN: CPT | Mod: S$GLB,,,

## 2018-02-26 RX ORDER — LEVOCETIRIZINE DIHYDROCHLORIDE 5 MG/1
TABLET, FILM COATED ORAL
COMMUNITY
Start: 2017-12-18 | End: 2018-02-26

## 2018-02-26 NOTE — PROGRESS NOTES
Subjective:       Patient ID: Karlos Clark is a 32 y.o. male.    Chief Complaint: Establish Care    This is a new pt to me.  Has a hx of HTN, hyperlipidemia, allergic rhinitis, GERD and obesity.  Pt is here for followup of chronic medical issues.  He also reports that his Xyzal makes him groggy and seems less effective than other meds he has taken.  He has a sinus polyp and uses Flonase daily.    He also is interested in losing weight with healthy diet--including portion control and exercise.      Review of Systems   Constitutional: Negative for appetite change, fatigue, fever and unexpected weight change.   HENT: Positive for postnasal drip, rhinorrhea and sneezing. Negative for congestion, hearing loss and trouble swallowing.    Eyes: Positive for itching. Negative for pain, redness and visual disturbance.   Respiratory: Negative for cough, chest tightness and shortness of breath.    Cardiovascular: Negative for chest pain, palpitations and leg swelling.   Gastrointestinal: Negative for abdominal pain, blood in stool, constipation, diarrhea and nausea.   Genitourinary: Negative for difficulty urinating, dysuria and flank pain.   Musculoskeletal: Negative for arthralgias, back pain and gait problem.   Neurological: Negative for numbness and headaches.   Psychiatric/Behavioral: Negative for behavioral problems and confusion.       Objective:      Physical Exam   Constitutional: He is oriented to person, place, and time. He appears well-developed and well-nourished.   HENT:   Head: Normocephalic.   Boggy nasal mucosa   Eyes: Conjunctivae and EOM are normal. Pupils are equal, round, and reactive to light.   Neck: Normal range of motion. Neck supple. No thyromegaly present.   Cardiovascular: Normal rate, regular rhythm and normal heart sounds.    Pulmonary/Chest: Effort normal and breath sounds normal.   Abdominal: Soft. Bowel sounds are normal. There is no tenderness.   Musculoskeletal: He exhibits no edema.    Neurological: He is alert and oriented to person, place, and time.   Skin: Skin is warm and dry.   Psychiatric: He has a normal mood and affect. His behavior is normal.       Assessment:       1. Hypertension, unspecified type    2. Hyperlipidemia, unspecified hyperlipidemia type    3. Chronic nonseasonal allergic rhinitis due to pollen    4. Class 3 obesity with body mass index (BMI) of 45.0 to 49.9 in adult, unspecified obesity type, unspecified whether serious comorbidity present        Plan:       Karlos was seen today for establish care.    Diagnoses and all orders for this visit:    Hypertension, unspecified type  -     Comprehensive metabolic panel; Future    Hyperlipidemia, unspecified hyperlipidemia type  -     Lipid panel; Future    Chronic nonseasonal allergic rhinitis due to pollen    Class 3 obesity with body mass index (BMI) of 45.0 to 49.9 in adult, unspecified obesity type, unspecified whether serious comorbidity present      During this visit, I reviewed the pt's history, medications, allergies, and problem list.   Will hold Xyzal--pt to try OTC Allegra--if it provides relief without sedation, will send in to pharmacy  Discussed diet and exercise plan

## 2018-03-20 ENCOUNTER — OFFICE VISIT (OUTPATIENT)
Dept: FAMILY MEDICINE | Facility: CLINIC | Age: 33
End: 2018-03-20
Payer: COMMERCIAL

## 2018-03-20 VITALS
HEIGHT: 70 IN | HEART RATE: 95 BPM | OXYGEN SATURATION: 98 % | BODY MASS INDEX: 45.1 KG/M2 | SYSTOLIC BLOOD PRESSURE: 128 MMHG | DIASTOLIC BLOOD PRESSURE: 100 MMHG | TEMPERATURE: 98 F | WEIGHT: 315 LBS

## 2018-03-20 DIAGNOSIS — M54.6 THORACIC SPINE PAIN: ICD-10-CM

## 2018-03-20 DIAGNOSIS — M54.2 NECK AND SHOULDER PAIN: Primary | ICD-10-CM

## 2018-03-20 DIAGNOSIS — S20.222A CONTUSION OF LEFT BACK WALL OF THORAX, INITIAL ENCOUNTER: ICD-10-CM

## 2018-03-20 DIAGNOSIS — M62.838 CERVICAL PARASPINAL MUSCLE SPASM: ICD-10-CM

## 2018-03-20 DIAGNOSIS — M25.519 NECK AND SHOULDER PAIN: Primary | ICD-10-CM

## 2018-03-20 PROCEDURE — 3074F SYST BP LT 130 MM HG: CPT | Mod: CPTII,S$GLB,, | Performed by: PHYSICIAN ASSISTANT

## 2018-03-20 PROCEDURE — 99999 PR PBB SHADOW E&M-EST. PATIENT-LVL III: CPT | Mod: PBBFAC,,, | Performed by: PHYSICIAN ASSISTANT

## 2018-03-20 PROCEDURE — 99214 OFFICE O/P EST MOD 30 MIN: CPT | Mod: S$GLB,,, | Performed by: PHYSICIAN ASSISTANT

## 2018-03-20 PROCEDURE — 3080F DIAST BP >= 90 MM HG: CPT | Mod: CPTII,S$GLB,, | Performed by: PHYSICIAN ASSISTANT

## 2018-03-20 RX ORDER — CETIRIZINE HYDROCHLORIDE 10 MG/1
10 TABLET ORAL DAILY
COMMUNITY
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
## 2018-04-19 ENCOUNTER — OFFICE VISIT (OUTPATIENT)
Dept: FAMILY MEDICINE | Facility: CLINIC | Age: 33
End: 2018-04-19
Payer: COMMERCIAL

## 2018-04-19 VITALS
HEIGHT: 71 IN | RESPIRATION RATE: 19 BRPM | DIASTOLIC BLOOD PRESSURE: 88 MMHG | HEART RATE: 84 BPM | OXYGEN SATURATION: 98 % | WEIGHT: 315 LBS | BODY MASS INDEX: 44.1 KG/M2 | TEMPERATURE: 98 F | SYSTOLIC BLOOD PRESSURE: 124 MMHG

## 2018-04-19 DIAGNOSIS — J32.0 CHRONIC MAXILLARY SINUSITIS: Primary | ICD-10-CM

## 2018-04-19 PROCEDURE — 3074F SYST BP LT 130 MM HG: CPT | Mod: CPTII,S$GLB,, | Performed by: PHYSICIAN ASSISTANT

## 2018-04-19 PROCEDURE — 99213 OFFICE O/P EST LOW 20 MIN: CPT | Mod: S$GLB,,, | Performed by: PHYSICIAN ASSISTANT

## 2018-04-19 PROCEDURE — 99999 PR PBB SHADOW E&M-EST. PATIENT-LVL IV: CPT | Mod: PBBFAC,,, | Performed by: PHYSICIAN ASSISTANT

## 2018-04-19 PROCEDURE — 3079F DIAST BP 80-89 MM HG: CPT | Mod: CPTII,S$GLB,, | Performed by: PHYSICIAN ASSISTANT

## 2018-04-19 RX ORDER — PREDNISONE 10 MG/1
TABLET ORAL
Qty: 21 TABLET | Refills: 0 | Status: SHIPPED | OUTPATIENT
Start: 2018-04-19 | End: 2018-05-25

## 2018-04-19 RX ORDER — CEFDINIR 300 MG/1
300 CAPSULE ORAL 2 TIMES DAILY
Qty: 20 CAPSULE | Refills: 0 | Status: SHIPPED | OUTPATIENT
Start: 2018-04-19 | End: 2018-04-29

## 2018-04-19 NOTE — PROGRESS NOTES
Subjective:       Patient ID: Karlos Clark is a 32 y.o. male.    Chief Complaint: Sinus Problem    HPI   Sinus discomfort x 3 wks  Hx chronic sinusitis and sinus disease  Review of Systems   Constitutional: Positive for activity change and fatigue. Negative for appetite change, chills, diaphoresis, fever and unexpected weight change.   HENT: Positive for congestion, postnasal drip, sinus pain and sinus pressure.    Eyes: Negative.    Respiratory: Positive for cough.    Cardiovascular: Negative.  Negative for chest pain and leg swelling.   Endocrine: Negative.    Genitourinary: Negative.    Musculoskeletal: Negative.    Skin: Negative.  Negative for rash.       Objective:      Physical Exam   Constitutional: He appears well-developed and well-nourished. No distress.   HENT:   Head: Normocephalic and atraumatic.   Right Ear: External ear normal.   Left Ear: External ear normal.   Nose: Nose normal.   Mouth/Throat: Oropharynx is clear and moist. No oropharyngeal exudate.   Max sinus fullness   Eyes: Conjunctivae are normal. No scleral icterus.   Neck: Normal range of motion. Neck supple. No tracheal deviation present. No thyromegaly present.   Cardiovascular: Normal rate, regular rhythm, normal heart sounds and intact distal pulses.  Exam reveals no gallop and no friction rub.    No murmur heard.  Pulmonary/Chest: Effort normal and breath sounds normal. No respiratory distress. He has no wheezes. He has no rales.   Musculoskeletal: He exhibits no edema.   Lymphadenopathy:     He has no cervical adenopathy.   Skin: Skin is warm and dry. No rash noted.   Vitals reviewed.      Assessment:       1. Chronic maxillary sinusitis        Plan:       Karlos was seen today for sinus problem.    Diagnoses and all orders for this visit:    Chronic maxillary sinusitis  -     cefdinir (OMNICEF) 300 MG capsule; Take 1 capsule (300 mg total) by mouth 2 (two) times daily.  -     predniSONE (DELTASONE) 10 MG tablet; 2 tabs daily x 1 wk  then 1 tab daily x 1 wk    discussed otc's  May need to update sinus CT to follow polyp   May need to refer to ENT

## 2018-04-20 ENCOUNTER — LAB VISIT (OUTPATIENT)
Dept: LAB | Facility: HOSPITAL | Age: 33
End: 2018-04-20
Attending: FAMILY MEDICINE
Payer: COMMERCIAL

## 2018-04-20 DIAGNOSIS — I10 HYPERTENSION, UNSPECIFIED TYPE: Chronic | ICD-10-CM

## 2018-04-20 DIAGNOSIS — E78.5 HYPERLIPIDEMIA, UNSPECIFIED HYPERLIPIDEMIA TYPE: ICD-10-CM

## 2018-04-20 LAB
ALBUMIN SERPL BCP-MCNC: 4.1 G/DL
ALP SERPL-CCNC: 69 U/L
ALT SERPL W/O P-5'-P-CCNC: 26 U/L
ANION GAP SERPL CALC-SCNC: 10 MMOL/L
AST SERPL-CCNC: 18 U/L
BILIRUB SERPL-MCNC: 0.4 MG/DL
BUN SERPL-MCNC: 14 MG/DL
CALCIUM SERPL-MCNC: 9.3 MG/DL
CHLORIDE SERPL-SCNC: 108 MMOL/L
CHOLEST SERPL-MCNC: 181 MG/DL
CHOLEST/HDLC SERPL: 4.9 {RATIO}
CO2 SERPL-SCNC: 26 MMOL/L
CREAT SERPL-MCNC: 0.8 MG/DL
EST. GFR  (AFRICAN AMERICAN): >60 ML/MIN/1.73 M^2
EST. GFR  (NON AFRICAN AMERICAN): >60 ML/MIN/1.73 M^2
GLUCOSE SERPL-MCNC: 90 MG/DL
HDLC SERPL-MCNC: 37 MG/DL
HDLC SERPL: 20.4 %
LDLC SERPL CALC-MCNC: 105.4 MG/DL
NONHDLC SERPL-MCNC: 144 MG/DL
POTASSIUM SERPL-SCNC: 4.1 MMOL/L
PROT SERPL-MCNC: 7.3 G/DL
SODIUM SERPL-SCNC: 144 MMOL/L
TRIGL SERPL-MCNC: 193 MG/DL

## 2018-04-20 PROCEDURE — 80053 COMPREHEN METABOLIC PANEL: CPT

## 2018-04-20 PROCEDURE — 36415 COLL VENOUS BLD VENIPUNCTURE: CPT | Mod: PO

## 2018-04-20 PROCEDURE — 80061 LIPID PANEL: CPT

## 2018-05-02 ENCOUNTER — OFFICE VISIT (OUTPATIENT)
Dept: OPTOMETRY | Facility: CLINIC | Age: 33
End: 2018-05-02
Payer: COMMERCIAL

## 2018-05-02 DIAGNOSIS — H52.203 MYOPIA OF BOTH EYES WITH ASTIGMATISM: ICD-10-CM

## 2018-05-02 DIAGNOSIS — H52.13 MYOPIA OF BOTH EYES WITH ASTIGMATISM: ICD-10-CM

## 2018-05-02 DIAGNOSIS — Z01.00 EXAMINATION OF EYES AND VISION: Primary | ICD-10-CM

## 2018-05-02 PROCEDURE — 99999 PR PBB SHADOW E&M-EST. PATIENT-LVL III: CPT | Mod: PBBFAC,,, | Performed by: OPTOMETRIST

## 2018-05-02 PROCEDURE — 92004 COMPRE OPH EXAM NEW PT 1/>: CPT | Mod: S$GLB,,, | Performed by: OPTOMETRIST

## 2018-05-02 PROCEDURE — 92015 DETERMINE REFRACTIVE STATE: CPT | Mod: S$GLB,,, | Performed by: OPTOMETRIST

## 2018-05-02 NOTE — PROGRESS NOTES
HPI     Annual Exam    Additional comments: DLE x 3 yrs (outside [facility])   ocular health exam            Blurred Vision    Additional comments: at both near & distance           Comments   Agree above  Prev wear cl's---issues with allergies  Prev spec w/ astig         Last edited by LING Huber, OD on 5/2/2018 11:20 AM. (History)        ROS     Positive for: Eyes    Negative for: Constitutional, Gastrointestinal, Neurological, Skin,   Genitourinary, Musculoskeletal, HENT, Endocrine, Cardiovascular,   Respiratory, Psychiatric, Allergic/Imm, Heme/Lymph    Last edited by LING Huber, OD on 5/2/2018 11:20 AM. (History)        Assessment /Plan     For exam results, see Encounter Report.    Examination of eyes and vision    Myopia of both eyes with astigmatism      1. Ocular health exam, normal OU  2. Updated specs rx gave copy, fill prn    HA probable w/ computer and uncorrected cyl OU  Knows to call/ message if specs not effective    Discussed and educated patient on current findings /plan.  RTC 1 year, prn if any changes / issues

## 2018-05-23 RX ORDER — CITALOPRAM 20 MG/1
20 TABLET, FILM COATED ORAL DAILY
Qty: 30 TABLET | Refills: 5 | Status: SHIPPED | OUTPATIENT
Start: 2018-05-23 | End: 2018-08-13 | Stop reason: SDUPTHER

## 2018-05-23 RX ORDER — LOVASTATIN 20 MG/1
20 TABLET ORAL DAILY
Qty: 30 TABLET | Refills: 5 | Status: SHIPPED | OUTPATIENT
Start: 2018-05-23 | End: 2018-08-13 | Stop reason: SDUPTHER

## 2018-05-24 ENCOUNTER — OFFICE VISIT (OUTPATIENT)
Dept: FAMILY MEDICINE | Facility: CLINIC | Age: 33
End: 2018-05-24
Payer: COMMERCIAL

## 2018-05-24 VITALS
TEMPERATURE: 99 F | OXYGEN SATURATION: 95 % | WEIGHT: 315 LBS | DIASTOLIC BLOOD PRESSURE: 92 MMHG | RESPIRATION RATE: 15 BRPM | SYSTOLIC BLOOD PRESSURE: 140 MMHG | BODY MASS INDEX: 44.1 KG/M2 | HEART RATE: 96 BPM | HEIGHT: 71 IN

## 2018-05-24 DIAGNOSIS — J32.9 BACTERIAL SINUSITIS: Primary | ICD-10-CM

## 2018-05-24 DIAGNOSIS — J33.8 MAXILLARY SINUS POLYP: ICD-10-CM

## 2018-05-24 DIAGNOSIS — J32.0 CHRONIC MAXILLARY SINUSITIS: ICD-10-CM

## 2018-05-24 DIAGNOSIS — B96.89 BACTERIAL SINUSITIS: Primary | ICD-10-CM

## 2018-05-24 PROCEDURE — 3080F DIAST BP >= 90 MM HG: CPT | Mod: CPTII,S$GLB,, | Performed by: PHYSICIAN ASSISTANT

## 2018-05-24 PROCEDURE — 99213 OFFICE O/P EST LOW 20 MIN: CPT | Mod: S$GLB,,, | Performed by: PHYSICIAN ASSISTANT

## 2018-05-24 PROCEDURE — 3008F BODY MASS INDEX DOCD: CPT | Mod: CPTII,S$GLB,, | Performed by: PHYSICIAN ASSISTANT

## 2018-05-24 PROCEDURE — 3077F SYST BP >= 140 MM HG: CPT | Mod: CPTII,S$GLB,, | Performed by: PHYSICIAN ASSISTANT

## 2018-05-24 PROCEDURE — 99999 PR PBB SHADOW E&M-EST. PATIENT-LVL V: CPT | Mod: PBBFAC,,, | Performed by: PHYSICIAN ASSISTANT

## 2018-05-24 RX ORDER — LEVOFLOXACIN 500 MG/1
500 TABLET, FILM COATED ORAL DAILY
Qty: 10 TABLET | Refills: 0 | Status: SHIPPED | OUTPATIENT
Start: 2018-05-24 | End: 2018-06-03

## 2018-05-24 NOTE — PROGRESS NOTES
Subjective:       Patient ID: Karlos Clark is a 32 y.o. male.    Chief Complaint: Nasal Congestion; Dizziness; and Headache    HPI   Pt has sinus pain x 1 wk with worsening sx x 2 days  Fever with purulent mucus  CT last yr. Showed suspicious polyp base of L max sinus 1 yr ago  Pt has long hx of infectious sinusitis  Review of Systems   Constitutional: Positive for activity change, chills, fatigue and fever. Negative for appetite change, diaphoresis and unexpected weight change.   HENT: Positive for congestion, postnasal drip, sinus pain and sinus pressure. Negative for sore throat.    Eyes: Negative.    Respiratory: Positive for cough.    Cardiovascular: Negative.    Gastrointestinal: Negative.    Endocrine: Negative.    Genitourinary: Negative.    Musculoskeletal: Negative.    Skin: Negative.  Negative for rash.       Objective:      Physical Exam   Constitutional: He appears well-developed and well-nourished. No distress.   HENT:   Head: Normocephalic and atraumatic.   Right Ear: External ear normal.   Left Ear: External ear normal.   Nose: Nose normal.   Mouth/Throat: Oropharynx is clear and moist. No oropharyngeal exudate.   Tender and fullness max sinuses L>R  Cloudy mucus   Eyes: Conjunctivae are normal. No scleral icterus.   Neck: Normal range of motion. Neck supple. No tracheal deviation present. No thyromegaly present.   Cardiovascular: Normal rate, regular rhythm, normal heart sounds and intact distal pulses.  Exam reveals no gallop and no friction rub.    No murmur heard.  Pulmonary/Chest: Effort normal and breath sounds normal. No respiratory distress. He has no wheezes. He has no rales.   Musculoskeletal: He exhibits no edema.   Lymphadenopathy:     He has no cervical adenopathy.   Skin: Skin is warm and dry. No rash noted.   Vitals reviewed.      Assessment:       1. Bacterial sinusitis    2. Chronic maxillary sinusitis    3. Maxillary sinus polyp        Plan:       Bacterial sinusitis  -      levoFLOXacin (LEVAQUIN) 500 MG tablet; Take 1 tablet (500 mg total) by mouth once daily.  Dispense: 10 tablet; Refill: 0  -     Ambulatory referral to ENT  -     CT Sinuses without Contrast; Future; Expected date: 05/24/2018    Chronic maxillary sinusitis  -     levoFLOXacin (LEVAQUIN) 500 MG tablet; Take 1 tablet (500 mg total) by mouth once daily.  Dispense: 10 tablet; Refill: 0  -     Ambulatory referral to ENT  -     CT Sinuses without Contrast; Future; Expected date: 05/24/2018    Maxillary sinus polyp  -     Ambulatory referral to ENT  -     CT Sinuses without Contrast; Future; Expected date: 05/24/2018    discussed otc's

## 2018-05-25 ENCOUNTER — OFFICE VISIT (OUTPATIENT)
Dept: FAMILY MEDICINE | Facility: CLINIC | Age: 33
End: 2018-05-25
Payer: COMMERCIAL

## 2018-05-25 VITALS
WEIGHT: 315 LBS | BODY MASS INDEX: 44.1 KG/M2 | OXYGEN SATURATION: 98 % | DIASTOLIC BLOOD PRESSURE: 110 MMHG | TEMPERATURE: 98 F | RESPIRATION RATE: 15 BRPM | SYSTOLIC BLOOD PRESSURE: 132 MMHG | HEIGHT: 71 IN | HEART RATE: 137 BPM

## 2018-05-25 DIAGNOSIS — I10 ESSENTIAL HYPERTENSION: Primary | ICD-10-CM

## 2018-05-25 DIAGNOSIS — R00.0 TACHYCARDIA: ICD-10-CM

## 2018-05-25 PROCEDURE — 99999 PR PBB SHADOW E&M-EST. PATIENT-LVL IV: CPT | Mod: PBBFAC,,, | Performed by: NURSE PRACTITIONER

## 2018-05-25 PROCEDURE — 3080F DIAST BP >= 90 MM HG: CPT | Mod: CPTII,S$GLB,, | Performed by: NURSE PRACTITIONER

## 2018-05-25 PROCEDURE — 99213 OFFICE O/P EST LOW 20 MIN: CPT | Mod: S$GLB,,, | Performed by: NURSE PRACTITIONER

## 2018-05-25 PROCEDURE — 3008F BODY MASS INDEX DOCD: CPT | Mod: CPTII,S$GLB,, | Performed by: NURSE PRACTITIONER

## 2018-05-25 PROCEDURE — 93000 ELECTROCARDIOGRAM COMPLETE: CPT | Mod: S$GLB,,, | Performed by: INTERNAL MEDICINE

## 2018-05-25 PROCEDURE — 3075F SYST BP GE 130 - 139MM HG: CPT | Mod: CPTII,S$GLB,, | Performed by: NURSE PRACTITIONER

## 2018-05-25 RX ORDER — METOPROLOL SUCCINATE 25 MG/1
25 TABLET, EXTENDED RELEASE ORAL DAILY
Qty: 30 TABLET | Refills: 0 | Status: SHIPPED | OUTPATIENT
Start: 2018-05-25 | End: 2018-06-22 | Stop reason: SDUPTHER

## 2018-05-25 NOTE — PROGRESS NOTES
Subjective:       Patient ID: Karlos Clark is a 32 y.o. male.  First time seeing pt in clinic.  Last seen by ROXANA MENDOZA on 5/24/2018.  Reviewed pt's medical, surgical, social, and family hx.     Chief Complaint: Hypertension  Pt reports his blood pressure has been elevated, he took his BP med twice today and it won't come down.   Denies chest pain, just has a headache. Pt was dx'd with a sinus infection yesterday and started on Levaquin, also took Sudafed yesterday at 6 pm.   HPI  Vitals:    05/25/18 1325   BP: (!) 132/110   Pulse: (!) 137   Resp: 15   Temp: 98.1 °F (36.7 °C)    Pulse 136   Review of Systems   Constitutional: Negative for chills, diaphoresis and fever.   Eyes: Negative for visual disturbance.   Respiratory: Negative for cough, chest tightness and shortness of breath.    Cardiovascular: Negative for chest pain.        Tachycardia and elevated BP, has HTN but it has been higher than normal.    Neurological: Positive for headaches.       Objective:      Physical Exam   Constitutional: He is oriented to person, place, and time. He appears well-developed and well-nourished. He is cooperative.   HENT:   Head: Normocephalic and atraumatic.   Right Ear: Hearing and external ear normal.   Left Ear: Hearing and external ear normal.   Mouth/Throat: Uvula is midline and mucous membranes are normal. Posterior oropharyngeal erythema (slight) present.   Eyes: Conjunctivae, EOM and lids are normal.   Neck: Normal range of motion.   Cardiovascular: Regular rhythm, normal heart sounds and normal pulses.  Tachycardia present.    Pulmonary/Chest: Effort normal and breath sounds normal. No respiratory distress. He has no decreased breath sounds.   Neurological: He is alert and oriented to person, place, and time.   Skin: Skin is warm and dry. Capillary refill takes less than 2 seconds.   Psychiatric: He has a normal mood and affect. His behavior is normal. Judgment and thought content normal.   Nursing note and  "vitals reviewed.      Assessment & Plan:       Essential hypertension  -     IN OFFICE EKG 12-LEAD (to Muse)  -     metoprolol succinate (TOPROL-XL) 25 MG 24 hr tablet; Take 1 tablet (25 mg total) by mouth once daily.  Dispense: 30 tablet; Refill: 0    Tachycardia  -     IN OFFICE EKG 12-LEAD (to Muse)  -     metoprolol succinate (TOPROL-XL) 25 MG 24 hr tablet; Take 1 tablet (25 mg total) by mouth once daily.  Dispense: 30 tablet; Refill: 0    Dr Hernández and I reviewed pt's EKG.  EKG result "Sinus tachycardia, Otherwise normal ECG"  Agreed with his recommendation to begin pt on metoprolol 25 mg 24 hour tablet po once a daily.   Instructed pt to hold metoprolol, if pulse rate less than 50.   Instructed not to take Sudafed OTC.   Keep BP log, goal BP below 140/90. Nurse visit in 1 week for BP recheck.   Follow up with PCP.   If symptoms worsen or persist, instructed to go to local ER for emergent care services.   Pt verbalized understanding.       Follow-up in about 7 days (around 6/1/2018), or if symptoms worsen or fail to improve.   Addendum:  EKG result 5/25/2018:  "Sinus tachycardia  Otherwise normal ECG  When compared with ECG of 24-JUN-2011 13:25,  Incomplete right bundle branch block is no longer Present  Confirmed by ROBERTO HERNANDEZ MD ([id number] on 5/28/2018 11:28:32 AM"    "

## 2018-05-28 ENCOUNTER — HOSPITAL ENCOUNTER (OUTPATIENT)
Dept: RADIOLOGY | Facility: HOSPITAL | Age: 33
Discharge: HOME | End: 2018-05-28
Attending: PHYSICIAN ASSISTANT
Payer: COMMERCIAL

## 2018-05-28 DIAGNOSIS — J33.8 MAXILLARY SINUS POLYP: ICD-10-CM

## 2018-05-28 DIAGNOSIS — J32.9 BACTERIAL SINUSITIS: ICD-10-CM

## 2018-05-28 DIAGNOSIS — J32.0 CHRONIC MAXILLARY SINUSITIS: ICD-10-CM

## 2018-05-28 DIAGNOSIS — B96.89 BACTERIAL SINUSITIS: ICD-10-CM

## 2018-05-28 PROCEDURE — 70486 CT MAXILLOFACIAL W/O DYE: CPT | Mod: 26,,, | Performed by: RADIOLOGY

## 2018-05-28 PROCEDURE — 70486 CT MAXILLOFACIAL W/O DYE: CPT | Mod: TC,PO

## 2018-05-29 NOTE — PATIENT INSTRUCTIONS
Taking Your Blood Pressure  Blood pressure is the force of blood against the artery wall as it moves from the heart through the blood vessels. You can take your own blood pressure reading using a digital monitor. Take your readings the same each time, using the same arm. Take readings as often as your healthcare provider instructs.  About blood pressure monitors  Blood pressure monitors are designed for certain ages and cases. You can find monitors for older adults, for pregnant women, and for children. Make sure the one you choose is the right one for your age and situation.  The American Heart Association recommends an automatic cuff monitor that fits on your upper arm (bicep). The cuff should fit your arm size. A cuff thats too large or too small will not give an accurate reading. Measure around your upper arm to find your size.  Monitors that attach to your finger or wrist are not as accurate as monitors for your upper arm.  Ask your healthcare provider for help in choosing a monitor. Bring your monitor to your next provider visit if you need help in using it the correct way.  The steps below are general instructions for using an automatic digital monitor.  Step 1. Relax    · Take your blood pressure at the same time every day, such as in the morning or evening, or at the time your healthcare provider recommends.  · Wait at least a half-hour after smoking, eating, or exercising. Don't drink coffee, tea, soda, or other caffeinated beverages before checking your blood pressure.  · Sit comfortably at a table with both feet on the floor. Do not cross your legs or feet. Place the monitor near you.  · Rest for a few minutes before you begin.  Step 2. Wrap the cuff    · Place your arm on the table, palm up. Your arm should be at the level of your heart. Wrap the cuff around your upper arm, just above your elbow. Its best done on bare skin, not over clothing. Most cuffs will indicate where the brachial artery (the  blood vessel in the middle of the arm at the inner side of the elbow) should line up with the cuff. Look in your monitor's instruction booklet for an illustration. You can also bring your cuff to your healthcare provider and have them show you how to correctly place the cuff.  Step 3. Inflate the cuff    · Push the button that starts the pump.  · The cuff will tighten, then loosen.  · The numbers will change. When they stop changing, your blood pressure reading will appear.  · Take 2 or 3 readings one minute apart.  Step 4. Write down the results of each reading    · Write down your blood pressure numbers for each reading. Note the date and time. Keep your results in one place, such as a notebook. Even if your monitor has a built-in memory, keep a hard copy of the readings.  · Remove the cuff from your arm. Turn off the machine.  · Bring your blood pressure records with your healthcare providers at each visit.  · If you start a new blood pressure medicine, note the day you started the new medicine. Also note the day if you change the dose of your medicine. This information goes on your blood pressure recording sheet. This will help your healthcare provider monitor how well the medicine changes are working.  · Ask your healthcare provider what numbers should prompt you to call him or her. Also ask what numbers should prompt you to get help right away.  Date Last Reviewed: 11/1/2016  © 4371-2576 [employer]. [address] [place] [address], [place] [address]. All rights reserved. This information is not intended as a substitute for professional medical care. Always follow your healthcare professional's instructions.

## 2018-06-01 ENCOUNTER — PATIENT MESSAGE (OUTPATIENT)
Dept: FAMILY MEDICINE | Facility: CLINIC | Age: 33
End: 2018-06-01

## 2018-06-01 DIAGNOSIS — F41.9 ANXIETY: Primary | ICD-10-CM

## 2018-06-01 RX ORDER — ALPRAZOLAM 0.5 MG/1
0.5 TABLET ORAL 3 TIMES DAILY PRN
Qty: 21 TABLET | Refills: 0 | Status: SHIPPED | OUTPATIENT
Start: 2018-06-01 | End: 2018-09-05

## 2018-06-06 ENCOUNTER — OFFICE VISIT (OUTPATIENT)
Dept: OTOLARYNGOLOGY | Facility: CLINIC | Age: 33
End: 2018-06-06
Payer: COMMERCIAL

## 2018-06-06 VITALS — BODY MASS INDEX: 45.1 KG/M2 | WEIGHT: 315 LBS | HEIGHT: 70 IN

## 2018-06-06 DIAGNOSIS — J34.3 NASAL TURBINATE HYPERTROPHY: ICD-10-CM

## 2018-06-06 DIAGNOSIS — J32.0 CHRONIC MAXILLARY SINUSITIS: ICD-10-CM

## 2018-06-06 PROCEDURE — 99243 OFF/OP CNSLTJ NEW/EST LOW 30: CPT | Mod: S$GLB,,, | Performed by: OTOLARYNGOLOGY

## 2018-06-06 PROCEDURE — 99999 PR PBB SHADOW E&M-EST. PATIENT-LVL II: CPT | Mod: PBBFAC,,, | Performed by: OTOLARYNGOLOGY

## 2018-06-06 RX ORDER — OXYMETAZOLINE HCL 0.05 %
2 SPRAY, NON-AEROSOL (ML) NASAL 2 TIMES DAILY
COMMUNITY
End: 2018-07-23

## 2018-06-06 NOTE — LETTER
June 6, 2018      Radames Beal PA-C  [address]  [place] [address]           [facility]  [address] [facility] [address] [place] [address]  Phone: [phone]  Fax: [phone]          Patient: Karlos Clark   MR Number: [id number]   YOB: 1985   Date of Visit: 6/6/2018       Dear Radames Beal:    Thank you for referring Karlos Clark to me for evaluation. Attached you will find relevant portions of my assessment and plan of care.    If you have questions, please do not hesitate to call me. I look forward to following Karlos Clark along with you.    Sincerely,    Graeme Garza MD    Enclosure  CC:  No Recipients    If you would like to receive this communication electronically, please contact [email or web address] or [phone] to request more information on [facility] access.    For providers and/or their staff who would like to refer a patient to [facility], please contact us through our one-stop-shop provider referral line, [facility] , at [phone].    If you feel you have received this communication in error or would no longer like to receive these types of communications, please e-mail [email or web address]

## 2018-06-06 NOTE — PROGRESS NOTES
Subjective:       Patient ID: Karlos Clark is a 32 y.o. male.    Chief Complaint: Nasal Polyps and Sinusitis    Karlos is here for sinus complaints. Symptoms have been present for years.   Post-nasal drainage: yes, thick white intermittent  Pressure: yes  Congestion: yes  Decreased sense of smell: yes, a little recently  Therapies tried: Flonase, Astelin, Zyrtec. Saline irrigations.   Seasonal variation: no  Feels smell is down from the nasal sprays.   Minimal laterality  Am sore throat.   5 antibiotics in the past six months     Allergy testing: yes, 2017 negative.   History of asthma: no  Anticoagulation: no   Pertinent meds: Has been using Afrin for the past week.   Previous surgery: septoplasty, turbinate 2011 with minimal relief    SNOT-22=scanned into media    History   Smoking Status    Never Smoker   Smokeless Tobacco    Never Used     History   Alcohol Use    0.6 oz/week    1 Shots of liquor per week     Comment: 2 drinks per month        Review of Systems   Constitutional: Negative for activity change and appetite change.   Eyes: Negative for discharge.   Respiratory: Negative for difficulty breathing and wheezing   Cardiovascular: Negative for chest pain.   Gastrointestinal: Negative for abdominal distention and abdominal pain.   Endocrine: Negative for cold intolerance and heat intolerance.   Genitourinary: Negative for dysuria.   Musculoskeletal: Negative for gait problem and joint swelling.   Skin: Negative for color change and pallor.   Neurological: Negative for syncope and weakness.   Psychiatric/Behavioral: Negative for agitation and confusion.     Objective:        Constitutional:   He is oriented to person, place, and time. He appears well-developed and well-nourished. He appears alert. He is active. Normal speech.      Head:  Normocephalic and atraumatic. Head is without TMJ tenderness. No scars. Salivary glands normal.  Facial strength is normal.      Ears:    Right Ear: No drainage or  swelling. No middle ear effusion.   Left Ear: No drainage or swelling.  No middle ear effusion.     Nose:  Mucosal edema present. No rhinorrhea or sinus tenderness. Turbinate hypertrophy.    Mild narrow INV    Mouth/Throat  Oropharynx clear and moist without lesions or asymmetry, normal uvula midline and mirror exam normal. Normal dentition. No uvula swelling, lacerations or trismus. No oropharyngeal exudate. Tonsillar erythema, tonsillar exudate.      Neck:  Full range of motion with neck supple and no adenopathy. Thyroid tenderness is present. No tracheal deviation, no edema, no erythema, normal range of motion, no stridor, no crepitus and no neck rigidity present. No thyroid mass present.     Cardiovascular:   Intact distal pulses and normal pulses.      Pulmonary/Chest:   Effort normal and breath sounds normal. No stridor.     Psychiatric:   His speech is normal and behavior is normal. His mood appears not anxious. His affect is not labile.     Neurological:   He is alert and oriented to person, place, and time. No sensory deficit.     Skin:   No abrasions, lacerations, lesions, or rashes. No abrasion and no bruising noted.         Tests / Results:  I personally reviewed the CT Sinus and my findings reveal: mild thickening in floor of maxillary sinus with a mucous retention cyst. Scant thickening in right maxillary sinus. Remainder of sinuses clear.      Assessment:       1. Chronic maxillary sinusitis    2. Nasal turbinate hypertrophy          Plan:       Aim to manage medically if able after discussion with patient  Budesonide rinses  If refractory issues and he would like to consider surgery, would consider turbinates and bilateral maxillary. Not balloon candidate.  Fu 3 months

## 2018-06-08 ENCOUNTER — CLINICAL SUPPORT (OUTPATIENT)
Dept: FAMILY MEDICINE | Facility: CLINIC | Age: 33
End: 2018-06-08
Payer: COMMERCIAL

## 2018-06-08 VITALS — DIASTOLIC BLOOD PRESSURE: 88 MMHG | SYSTOLIC BLOOD PRESSURE: 138 MMHG

## 2018-06-08 NOTE — PROGRESS NOTES
Karlos Clark 32 y.o. male is here today for Blood Pressure check.   History of HTN yes.    Review of patient's allergies indicates:   Allergen Reactions    No known drug allergies      Creatinine   Date Value Ref Range Status   04/20/2018 0.8 0.5 - 1.4 mg/dL Final     Sodium   Date Value Ref Range Status   04/20/2018 144 136 - 145 mmol/L Final     Potassium   Date Value Ref Range Status   04/20/2018 4.1 3.5 - 5.1 mmol/L Final   ]  Patient verifies taking blood pressure medications on a regular basis at the same time of the day.     Current Outpatient Prescriptions:     ALPRAZolam (XANAX) 0.5 MG tablet, Take 1 tablet (0.5 mg total) by mouth 3 (three) times daily as needed for Anxiety., Disp: 21 tablet, Rfl: 0    azelastine (ASTELIN) 137 mcg (0.1 %) nasal spray, INSTILL 1 SPRAY IN EACH NOSTRIL TWO TIMES A DAY AS DIRECTED, Disp: 30 mL, Rfl: 5    benazepril-hydrochlorthiazide (LOTENSIN HCT) 20-12.5 mg per tablet, TAKE ONE TABLET BY MOUTH EVERY DAY, Disp: 30 tablet, Rfl: 5    cetirizine (ZYRTEC) 10 MG tablet, Take 10 mg by mouth once daily., Disp: , Rfl:     citalopram (CELEXA) 20 MG tablet, TAKE ONE TABLET BY MOUTH EVERY DAY, Disp: 30 tablet, Rfl: 5    fluticasone (FLONASE ALLERGY RELIEF) 50 mcg/actuation nasal spray, APPLY TWO SPRAYS IN EACH NOSTRIL EVERY DAY, Disp: 16 g, Rfl: 5    lovastatin (MEVACOR) 20 MG tablet, TAKE ONE TABLET BY MOUTH EVERY DAY, Disp: 30 tablet, Rfl: 5    metoprolol succinate (TOPROL-XL) 25 MG 24 hr tablet, Take 1 tablet (25 mg total) by mouth once daily., Disp: 30 tablet, Rfl: 0    multivitamin (THERAGRAN) tablet, Take 1 tablet by mouth once daily., Disp: , Rfl:     oxymetazoline (AFRIN) 0.05 % nasal spray, 2 sprays by Nasal route 2 (two) times daily., Disp: , Rfl:     pantoprazole (PROTONIX) 40 MG tablet, Take 40 mg by mouth., Disp: , Rfl:   Does patient have record of home blood pressure readings yes. Readings have been averaging 130/80.   Last dose of blood pressure medication  was taken at 06/08/18 at 0645.  Patient is asymptomatic.   Complains of nothing.    BP: 138/88 ,   .    Blood pressure reading after 15 minutes was 138/88.

## 2018-06-14 ENCOUNTER — OFFICE VISIT (OUTPATIENT)
Dept: FAMILY MEDICINE | Facility: CLINIC | Age: 33
End: 2018-06-14
Payer: COMMERCIAL

## 2018-06-14 VITALS
BODY MASS INDEX: 47.54 KG/M2 | TEMPERATURE: 99 F | DIASTOLIC BLOOD PRESSURE: 96 MMHG | SYSTOLIC BLOOD PRESSURE: 122 MMHG | HEART RATE: 82 BPM | OXYGEN SATURATION: 97 % | WEIGHT: 315 LBS

## 2018-06-14 DIAGNOSIS — B37.2 CANDIDAL SKIN INFECTION: ICD-10-CM

## 2018-06-14 DIAGNOSIS — H66.001 ACUTE SUPPURATIVE OTITIS MEDIA OF RIGHT EAR WITHOUT SPONTANEOUS RUPTURE OF TYMPANIC MEMBRANE, RECURRENCE NOT SPECIFIED: Primary | ICD-10-CM

## 2018-06-14 DIAGNOSIS — H92.01 RIGHT EAR PAIN: ICD-10-CM

## 2018-06-14 PROCEDURE — 3080F DIAST BP >= 90 MM HG: CPT | Mod: CPTII,S$GLB,, | Performed by: NURSE PRACTITIONER

## 2018-06-14 PROCEDURE — 3008F BODY MASS INDEX DOCD: CPT | Mod: CPTII,S$GLB,, | Performed by: NURSE PRACTITIONER

## 2018-06-14 PROCEDURE — 99999 PR PBB SHADOW E&M-EST. PATIENT-LVL IV: CPT | Mod: PBBFAC,,, | Performed by: NURSE PRACTITIONER

## 2018-06-14 PROCEDURE — 3074F SYST BP LT 130 MM HG: CPT | Mod: CPTII,S$GLB,, | Performed by: NURSE PRACTITIONER

## 2018-06-14 PROCEDURE — 99213 OFFICE O/P EST LOW 20 MIN: CPT | Mod: S$GLB,,, | Performed by: NURSE PRACTITIONER

## 2018-06-14 RX ORDER — BUDESONIDE 0.5 MG/2ML
INHALANT ORAL
COMMUNITY
Start: 2018-06-07 | End: 2019-07-23

## 2018-06-14 RX ORDER — AMOXICILLIN AND CLAVULANATE POTASSIUM 875; 125 MG/1; MG/1
1 TABLET, FILM COATED ORAL EVERY 12 HOURS
Qty: 20 TABLET | Refills: 0 | Status: SHIPPED | OUTPATIENT
Start: 2018-06-14 | End: 2018-06-24

## 2018-06-14 RX ORDER — NYSTATIN 100000 [USP'U]/G
POWDER TOPICAL 2 TIMES DAILY
Qty: 60 G | Refills: 1 | Status: SHIPPED | OUTPATIENT
Start: 2018-06-14 | End: 2018-06-14 | Stop reason: CLARIF

## 2018-06-14 RX ORDER — NYSTATIN 100000 [USP'U]/G
POWDER TOPICAL 2 TIMES DAILY
Qty: 60 G | Refills: 1 | Status: SHIPPED | OUTPATIENT
Start: 2018-06-14 | End: 2019-07-23

## 2018-06-14 NOTE — PROGRESS NOTES
"Subjective:       Patient ID: Karlos Clark is a 32 y.o. male.   Last seen on 5/25/2018. Last seen by PCP, Dr Munoz on 2/26/2018.   Reviewed pt's medical, surgical, social, and family hx.     Chief Complaint: Otalgia (right) and Recurrent Skin Infections  Pt is following with ENT for chronic sinus infections.  Pt currently using Pulmicort.    Presents today with right ear pain.   Pt reports he was recently on an antibiotic and noticed a rash to his left groin, crease, red and itchy.   Pt reports he didn't take his BP med this am forgot.   Otalgia    There is pain in the right ear. This is a new problem. The current episode started yesterday. There has been no fever. Associated symptoms include a rash (left groin erythema ). Pertinent negatives include no coughing, rhinorrhea or sore throat. He has tried nothing for the symptoms.   Rash   This is a new problem. The current episode started yesterday. The affected locations include the groin (left groin. Pt describes it as "jock itch"). The rash is characterized by redness (erythema to crease). Pertinent negatives include no cough, rhinorrhea, shortness of breath or sore throat. (Was recently on antibiotics. ) Past treatments include nothing.     Vitals:    06/14/18 1106   BP: (!) 122/96   Pulse: 82   Temp: 99.1 °F (37.3 °C)     Review of Systems   Constitutional: Positive for activity change.   HENT: Positive for ear pain. Negative for rhinorrhea and sore throat.    Respiratory: Negative for cough, chest tightness and shortness of breath.    Cardiovascular: Negative for chest pain.   Skin: Positive for rash (left groin erythema ).       Objective:      Physical Exam   Constitutional: He is oriented to person, place, and time. He appears well-developed and well-nourished.   HENT:   Head: Normocephalic and atraumatic.   Right Ear: Hearing, external ear and ear canal normal. Tympanic membrane is erythematous and bulging. A middle ear effusion is present.   Left Ear: " Hearing, external ear and ear canal normal. Tympanic membrane is injected. A middle ear effusion is present.   Nose: Nose normal.   Mouth/Throat: Uvula is midline, oropharynx is clear and moist and mucous membranes are normal.   Eyes: Conjunctivae, EOM and lids are normal.   Neck: Normal range of motion.   Cardiovascular: Normal rate, regular rhythm, S1 normal, S2 normal, normal heart sounds and normal pulses.    Pulmonary/Chest: Effort normal and breath sounds normal. No respiratory distress.   Lymphadenopathy:        Head (right side): No submental, no submandibular, no tonsillar, no preauricular and no posterior auricular adenopathy present.        Head (left side): No submental, no submandibular, no tonsillar, no preauricular and no posterior auricular adenopathy present.   Neurological: He is alert and oriented to person, place, and time.   Skin: Skin is warm and dry. Capillary refill takes less than 2 seconds.   Psychiatric: He has a normal mood and affect. His speech is normal and behavior is normal. Judgment and thought content normal.   Nursing note and vitals reviewed.      Assessment & Plan:       Acute suppurative otitis media of right ear without spontaneous rupture of tympanic membrane, recurrence not specified  -     amoxicillin-clavulanate 875-125mg (AUGMENTIN) 875-125 mg per tablet; Take 1 tablet by mouth every 12 (twelve) hours.  Dispense: 20 tablet; Refill: 0    Candidal skin infection  -     nystatin (MYCOSTATIN) powder; Apply topically 2 (two) times daily.  Dispense: 60 g; Refill: 1    Right ear pain  Ibuprofen OTC as directed for discomfort.     Follow up in 2 weeks for ear recheck .      Follow-up in about 2 weeks (around 6/28/2018), or if symptoms worsen or fail to improve.

## 2018-06-19 ENCOUNTER — PATIENT MESSAGE (OUTPATIENT)
Dept: OTOLARYNGOLOGY | Facility: CLINIC | Age: 33
End: 2018-06-19

## 2018-06-22 DIAGNOSIS — I10 ESSENTIAL HYPERTENSION: ICD-10-CM

## 2018-06-22 DIAGNOSIS — R00.0 TACHYCARDIA: ICD-10-CM

## 2018-06-22 RX ORDER — METOPROLOL SUCCINATE 25 MG/1
25 TABLET, EXTENDED RELEASE ORAL DAILY
Qty: 90 TABLET | Refills: 0 | Status: SHIPPED | OUTPATIENT
Start: 2018-06-22 | End: 2018-08-13 | Stop reason: SDUPTHER

## 2018-06-28 ENCOUNTER — CLINICAL SUPPORT (OUTPATIENT)
Dept: FAMILY MEDICINE | Facility: CLINIC | Age: 33
End: 2018-06-28
Payer: COMMERCIAL

## 2018-06-28 DIAGNOSIS — Z01.30 BP CHECK: Primary | ICD-10-CM

## 2018-06-28 PROCEDURE — 99499 UNLISTED E&M SERVICE: CPT | Mod: S$GLB,,, | Performed by: FAMILY MEDICINE

## 2018-06-28 PROCEDURE — 99999 PR PBB SHADOW E&M-EST. PATIENT-LVL I: CPT | Mod: PBBFAC,,,

## 2018-06-29 VITALS — HEART RATE: 86 BPM | DIASTOLIC BLOOD PRESSURE: 94 MMHG | SYSTOLIC BLOOD PRESSURE: 138 MMHG

## 2018-06-29 NOTE — PROGRESS NOTES
Karlos Clark 32 y.o. male is here today for Blood Pressure check.   History of HTN yes.    Review of patient's allergies indicates:   Allergen Reactions    No known drug allergies      Creatinine   Date Value Ref Range Status   04/20/2018 0.8 0.5 - 1.4 mg/dL Final     Sodium   Date Value Ref Range Status   04/20/2018 144 136 - 145 mmol/L Final     Potassium   Date Value Ref Range Status   04/20/2018 4.1 3.5 - 5.1 mmol/L Final   ]  Patient verifies taking blood pressure medications on a regular basis at the same time of the day.     Current Outpatient Prescriptions:     ALPRAZolam (XANAX) 0.5 MG tablet, Take 1 tablet (0.5 mg total) by mouth 3 (three) times daily as needed for Anxiety., Disp: 21 tablet, Rfl: 0    azelastine (ASTELIN) 137 mcg (0.1 %) nasal spray, INSTILL 1 SPRAY IN EACH NOSTRIL TWO TIMES A DAY AS DIRECTED, Disp: 30 mL, Rfl: 5    benazepril-hydrochlorthiazide (LOTENSIN HCT) 20-12.5 mg per tablet, TAKE ONE TABLET BY MOUTH EVERY DAY, Disp: 30 tablet, Rfl: 5    budesonide (PULMICORT) 0.5 mg/2 mL nebulizer solution, , Disp: , Rfl:     cetirizine (ZYRTEC) 10 MG tablet, Take 10 mg by mouth once daily., Disp: , Rfl:     citalopram (CELEXA) 20 MG tablet, TAKE ONE TABLET BY MOUTH EVERY DAY, Disp: 30 tablet, Rfl: 5    fluticasone (FLONASE ALLERGY RELIEF) 50 mcg/actuation nasal spray, APPLY TWO SPRAYS IN EACH NOSTRIL EVERY DAY, Disp: 16 g, Rfl: 5    lovastatin (MEVACOR) 20 MG tablet, TAKE ONE TABLET BY MOUTH EVERY DAY, Disp: 30 tablet, Rfl: 5    metoprolol succinate (TOPROL-XL) 25 MG 24 hr tablet, Take 1 tablet (25 mg total) by mouth once daily., Disp: 90 tablet, Rfl: 0    multivitamin (THERAGRAN) tablet, Take 1 tablet by mouth once daily., Disp: , Rfl:     nystatin (MYCOSTATIN) powder, Apply topically 2 (two) times daily., Disp: 60 g, Rfl: 1    oxymetazoline (AFRIN) 0.05 % nasal spray, 2 sprays by Nasal route 2 (two) times daily., Disp: , Rfl:     pantoprazole (PROTONIX) 40 MG tablet, Take 40 mg  by mouth., Disp: , Rfl:   Does patient have record of home blood pressure readings no. Readings have been averaging .   Last dose of blood pressure medication was taken at 0645.  Patient is asymptomatic.   Complains of no complaints at this time.      ,   .    Blood pressure reading after 15 minutes was 138/94, Pulse 86.  Dr. Munoz notified.

## 2018-07-02 ENCOUNTER — OFFICE VISIT (OUTPATIENT)
Dept: FAMILY MEDICINE | Facility: CLINIC | Age: 33
End: 2018-07-02
Payer: COMMERCIAL

## 2018-07-02 VITALS
OXYGEN SATURATION: 96 % | BODY MASS INDEX: 45.1 KG/M2 | TEMPERATURE: 98 F | SYSTOLIC BLOOD PRESSURE: 128 MMHG | HEIGHT: 70 IN | WEIGHT: 315 LBS | HEART RATE: 85 BPM | DIASTOLIC BLOOD PRESSURE: 90 MMHG

## 2018-07-02 DIAGNOSIS — I10 ESSENTIAL HYPERTENSION: Primary | Chronic | ICD-10-CM

## 2018-07-02 DIAGNOSIS — M25.541 ARTHRALGIA OF BOTH HANDS: ICD-10-CM

## 2018-07-02 DIAGNOSIS — M25.542 ARTHRALGIA OF BOTH HANDS: ICD-10-CM

## 2018-07-02 DIAGNOSIS — Z86.69 FOLLOW-UP OTITIS MEDIA, RESOLVED: ICD-10-CM

## 2018-07-02 DIAGNOSIS — Z09 FOLLOW-UP OTITIS MEDIA, RESOLVED: ICD-10-CM

## 2018-07-02 PROCEDURE — 3080F DIAST BP >= 90 MM HG: CPT | Mod: CPTII,S$GLB,, | Performed by: NURSE PRACTITIONER

## 2018-07-02 PROCEDURE — 3074F SYST BP LT 130 MM HG: CPT | Mod: CPTII,S$GLB,, | Performed by: NURSE PRACTITIONER

## 2018-07-02 PROCEDURE — 3008F BODY MASS INDEX DOCD: CPT | Mod: CPTII,S$GLB,, | Performed by: NURSE PRACTITIONER

## 2018-07-02 PROCEDURE — 99999 PR PBB SHADOW E&M-EST. PATIENT-LVL IV: CPT | Mod: PBBFAC,,, | Performed by: NURSE PRACTITIONER

## 2018-07-02 PROCEDURE — 99213 OFFICE O/P EST LOW 20 MIN: CPT | Mod: S$GLB,,, | Performed by: NURSE PRACTITIONER

## 2018-07-02 RX ORDER — DICLOFENAC SODIUM 10 MG/G
2 GEL TOPICAL 4 TIMES DAILY
Qty: 100 G | Refills: 0 | Status: SHIPPED | OUTPATIENT
Start: 2018-07-02 | End: 2019-07-23

## 2018-07-02 NOTE — PROGRESS NOTES
Subjective:       Patient ID: Karlos Clark is a 32 y.o. male.  Last seen pt on 6/14/2018.   Reviewed pt's medical, surgical, social, and family hx.     Chief Complaint: Hypertension (F/U) and Otitis Media (F/U)  Pt reports his right ear is better.  Denies pain to right ear, fever or URI s/s.    Pt reports he spoke with Dr Munoz, his PCP on 6/28/2018 due to  BP re check, 138/94 Pulse 86. Pt reports he has increased his metoprolol in am to 50 mg per Dr Munoz's recommendation.    Pt requesting diclofenac topical gel for recurrent arthralgias to bilateral hands.   HPI  Vitals:    07/02/18 0926   BP: (!) 128/90   Pulse: 85   Temp: 98.1 °F (36.7 °C)     Review of Systems   Constitutional: Negative for chills, diaphoresis and fever.   HENT: Negative for congestion, ear discharge, ear pain, postnasal drip, rhinorrhea, sinus pain, sinus pressure, sneezing, sore throat and voice change.    Eyes: Negative for visual disturbance.   Respiratory: Negative for cough, chest tightness and shortness of breath.    Cardiovascular: Negative for chest pain and palpitations.   Musculoskeletal: Positive for arthralgias (bilateral hands). Negative for neck pain.   Neurological: Negative for headaches.       Objective:      Physical Exam   Constitutional: He is oriented to person, place, and time. Vital signs are normal. He appears well-developed and well-nourished. He is cooperative.   HENT:   Head: Normocephalic and atraumatic.   Right Ear: Hearing, tympanic membrane, external ear and ear canal normal.   Left Ear: Hearing, tympanic membrane, external ear and ear canal normal.   Nose: Nose normal.   Mouth/Throat: Uvula is midline, oropharynx is clear and moist and mucous membranes are normal.   Eyes: Conjunctivae, EOM and lids are normal.   Neck: Normal range of motion. Neck supple.   Cardiovascular: Normal rate, regular rhythm, S1 normal, S2 normal, normal heart sounds and normal pulses.    Pulmonary/Chest: Effort normal and breath  sounds normal. No respiratory distress.   Musculoskeletal: Normal range of motion.   Lymphadenopathy:        Head (right side): No submental, no submandibular, no tonsillar, no preauricular and no posterior auricular adenopathy present.        Head (left side): No submental, no submandibular, no tonsillar, no preauricular and no posterior auricular adenopathy present.   Neurological: He is alert and oriented to person, place, and time.   Skin: Skin is warm and dry. Capillary refill takes less than 2 seconds.   Psychiatric: He has a normal mood and affect. His speech is normal and behavior is normal. Judgment and thought content normal.   Nursing note and vitals reviewed.      Assessment & Plan:       Follow-up otitis media, resolved  Follow up with ENT Md, as he recommends.      Essential hypertension  Continue current dose of metoprolol as per Dr Munoz's recommendation.     Arthralgia of both hands  -     diclofenac sodium 1 % Gel; Apply 2 g topically 4 (four) times daily. for 10 days  Dispense: 100 g; Refill: 0      Follow-up if symptoms worsen or fail to improve.

## 2018-07-23 ENCOUNTER — OFFICE VISIT (OUTPATIENT)
Dept: FAMILY MEDICINE | Facility: CLINIC | Age: 33
End: 2018-07-23
Payer: COMMERCIAL

## 2018-07-23 VITALS
BODY MASS INDEX: 45.1 KG/M2 | DIASTOLIC BLOOD PRESSURE: 99 MMHG | WEIGHT: 315 LBS | SYSTOLIC BLOOD PRESSURE: 138 MMHG | HEIGHT: 70 IN | OXYGEN SATURATION: 96 % | HEART RATE: 78 BPM | TEMPERATURE: 99 F

## 2018-07-23 DIAGNOSIS — R05.9 COUGH: ICD-10-CM

## 2018-07-23 DIAGNOSIS — I10 ESSENTIAL HYPERTENSION: Chronic | ICD-10-CM

## 2018-07-23 DIAGNOSIS — J02.9 PHARYNGITIS, UNSPECIFIED ETIOLOGY: Primary | ICD-10-CM

## 2018-07-23 LAB
CTP QC/QA: YES
S PYO RRNA THROAT QL PROBE: NEGATIVE

## 2018-07-23 PROCEDURE — 99999 PR PBB SHADOW E&M-EST. PATIENT-LVL IV: CPT | Mod: PBBFAC,,, | Performed by: NURSE PRACTITIONER

## 2018-07-23 PROCEDURE — 87070 CULTURE OTHR SPECIMN AEROBIC: CPT

## 2018-07-23 PROCEDURE — 3008F BODY MASS INDEX DOCD: CPT | Mod: CPTII,S$GLB,, | Performed by: NURSE PRACTITIONER

## 2018-07-23 PROCEDURE — 3080F DIAST BP >= 90 MM HG: CPT | Mod: CPTII,S$GLB,, | Performed by: NURSE PRACTITIONER

## 2018-07-23 PROCEDURE — 87880 STREP A ASSAY W/OPTIC: CPT | Mod: QW,S$GLB,, | Performed by: NURSE PRACTITIONER

## 2018-07-23 PROCEDURE — 3075F SYST BP GE 130 - 139MM HG: CPT | Mod: CPTII,S$GLB,, | Performed by: NURSE PRACTITIONER

## 2018-07-23 PROCEDURE — 99213 OFFICE O/P EST LOW 20 MIN: CPT | Mod: S$GLB,,, | Performed by: NURSE PRACTITIONER

## 2018-07-23 RX ORDER — BENAZEPRIL HYDROCHLORIDE AND HYDROCHLOROTHIAZIDE 20; 12.5 MG/1; MG/1
TABLET ORAL DAILY
Qty: 30 TABLET | Refills: 2 | Status: SHIPPED | OUTPATIENT
Start: 2018-07-23 | End: 2018-08-13 | Stop reason: SDUPTHER

## 2018-07-23 RX ORDER — BENZONATATE 200 MG/1
200 CAPSULE ORAL 3 TIMES DAILY PRN
Qty: 30 CAPSULE | Refills: 0 | Status: SHIPPED | OUTPATIENT
Start: 2018-07-23 | End: 2018-08-02

## 2018-07-23 NOTE — PROGRESS NOTES
Subjective:       Patient ID: Karlos Clark is a 32 y.o. male.  Pt known to me, last seen on 7/2/2018. Last visit with PCP, Dr Munoz on 2/26/2018.   Reviewed pt's medical, surgical, social, and family hx.     Chief Complaint: Sore Throat (started yesterday a.m.); Cough (yellow mucus); and Nasal Congestion  Pt reports he started yesterday am with productive cough, nasal congestion, and sore throat. Pt has tried Coricidin OTC. Denies fever or myalgias.  Pt reports he is out of his BP medication and needs refill on Lotensin HCT  Sore Throat    This is a new problem. The current episode started yesterday. There has been no fever. Associated symptoms include congestion and coughing. Pertinent negatives include no abdominal pain, diarrhea, ear discharge, ear pain, shortness of breath or vomiting. Associated symptoms comments: Post nasal drip. Treatments tried: Coricidin OTC.   Cough   This is a new problem. The cough is productive of purulent sputum. Associated symptoms include postnasal drip and a sore throat. Pertinent negatives include no chest pain, chills, ear pain, fever, myalgias, rhinorrhea or shortness of breath. Treatments tried: Coricidin OTC.     Vitals:    07/23/18 1102   BP: (!) 138/99   Pulse: 78   Temp: 98.9 °F (37.2 °C)     Review of Systems   Constitutional: Negative for chills, diaphoresis and fever.   HENT: Positive for congestion, postnasal drip and sore throat. Negative for ear discharge, ear pain, rhinorrhea, sinus pain, sinus pressure, sneezing and voice change.    Eyes: Negative for visual disturbance.   Respiratory: Positive for cough. Negative for chest tightness and shortness of breath.    Cardiovascular: Negative for chest pain.   Gastrointestinal: Negative for abdominal pain, diarrhea, nausea and vomiting.   Musculoskeletal: Negative for myalgias.       Objective:      Physical Exam   Constitutional: He is oriented to person, place, and time. Vital signs are normal. He appears well-developed  and well-nourished. He is cooperative.   HENT:   Head: Normocephalic and atraumatic.   Right Ear: Hearing, tympanic membrane, external ear and ear canal normal.   Left Ear: Hearing, tympanic membrane, external ear and ear canal normal.   Nose: Nose normal.   Mouth/Throat: Uvula is midline and mucous membranes are normal. Posterior oropharyngeal erythema ( slight) present.   Eyes: Conjunctivae, EOM and lids are normal.   Neck: Normal range of motion.   Cardiovascular: Normal rate, regular rhythm, S1 normal, S2 normal, normal heart sounds and normal pulses.    Pulmonary/Chest: Effort normal and breath sounds normal. No respiratory distress. He has no decreased breath sounds.   Lymphadenopathy:        Head (right side): No submental, no submandibular, no tonsillar, no preauricular and no posterior auricular adenopathy present.        Head (left side): No submental, no submandibular, no tonsillar, no preauricular and no posterior auricular adenopathy present.   Neurological: He is alert and oriented to person, place, and time.   Skin: Skin is warm and dry. Capillary refill takes less than 2 seconds.   Psychiatric: He has a normal mood and affect. His speech is normal and behavior is normal. Judgment and thought content normal.   Nursing note and vitals reviewed.      Assessment & Plan:       Pharyngitis, unspecified etiology  -     POCT Rapid Strep A, Negative strep in clinic.   -     Throat culture, pending.  Will call pt with cx results.     Cough  -     benzonatate (TESSALON) 200 MG capsule; Take 1 capsule (200 mg total) by mouth 3 (three) times daily as needed.  Dispense: 30 capsule; Refill: 0    Viral etiology suspected at this time.  Supportive care encouraged.   May continue with Coricidin OTC as directed.   Increase water intake and stay well hydrated.    Essential hypertension  -     benazepril-hydrochlorthiazide (LOTENSIN HCT) 20-12.5 mg per tablet; TAKE ONE TABLET BY MOUTH EVERY DAY  Dispense: 30 tablet;  Refill: 2    Scheduled with Dr Munoz for follow up HTN on 8/28/2018.      Follow-up if symptoms worsen or fail to improve.

## 2018-07-25 NOTE — PATIENT INSTRUCTIONS

## 2018-07-26 LAB — BACTERIA THROAT CULT: NORMAL

## 2018-08-13 ENCOUNTER — PATIENT MESSAGE (OUTPATIENT)
Dept: FAMILY MEDICINE | Facility: CLINIC | Age: 33
End: 2018-08-13

## 2018-08-13 DIAGNOSIS — R00.0 TACHYCARDIA: ICD-10-CM

## 2018-08-13 DIAGNOSIS — I10 ESSENTIAL HYPERTENSION: Chronic | ICD-10-CM

## 2018-08-13 RX ORDER — LOVASTATIN 20 MG/1
20 TABLET ORAL DAILY
Qty: 30 TABLET | Refills: 5 | Status: SHIPPED | OUTPATIENT
Start: 2018-08-13 | End: 2019-07-23

## 2018-08-13 RX ORDER — METOPROLOL SUCCINATE 50 MG/1
50 TABLET, EXTENDED RELEASE ORAL DAILY
Qty: 30 TABLET | Refills: 5 | Status: SHIPPED | OUTPATIENT
Start: 2018-08-13 | End: 2019-07-23

## 2018-08-13 RX ORDER — BENAZEPRIL HYDROCHLORIDE AND HYDROCHLOROTHIAZIDE 20; 12.5 MG/1; MG/1
TABLET ORAL DAILY
Qty: 30 TABLET | Refills: 5 | Status: SHIPPED | OUTPATIENT
Start: 2018-08-13 | End: 2019-07-23

## 2018-08-13 RX ORDER — CITALOPRAM 20 MG/1
20 TABLET, FILM COATED ORAL DAILY
Qty: 30 TABLET | Refills: 5 | Status: SHIPPED | OUTPATIENT
Start: 2018-08-13 | End: 2019-07-23

## 2018-09-05 ENCOUNTER — PATIENT MESSAGE (OUTPATIENT)
Dept: ADMINISTRATIVE | Facility: OTHER | Age: 33
End: 2018-09-05

## 2018-09-05 ENCOUNTER — OFFICE VISIT (OUTPATIENT)
Dept: FAMILY MEDICINE | Facility: CLINIC | Age: 33
End: 2018-09-05
Payer: COMMERCIAL

## 2018-09-05 VITALS
TEMPERATURE: 98 F | RESPIRATION RATE: 20 BRPM | SYSTOLIC BLOOD PRESSURE: 140 MMHG | HEART RATE: 77 BPM | BODY MASS INDEX: 45.1 KG/M2 | WEIGHT: 315 LBS | HEIGHT: 70 IN | OXYGEN SATURATION: 97 % | DIASTOLIC BLOOD PRESSURE: 88 MMHG

## 2018-09-05 DIAGNOSIS — I10 ESSENTIAL HYPERTENSION: Primary | Chronic | ICD-10-CM

## 2018-09-05 DIAGNOSIS — R53.83 OTHER FATIGUE: ICD-10-CM

## 2018-09-05 DIAGNOSIS — J30.89 CHRONIC NONSEASONAL ALLERGIC RHINITIS DUE TO POLLEN: ICD-10-CM

## 2018-09-05 DIAGNOSIS — E78.49 OTHER HYPERLIPIDEMIA: ICD-10-CM

## 2018-09-05 PROCEDURE — 99999 PR PBB SHADOW E&M-EST. PATIENT-LVL IV: CPT | Mod: PBBFAC,,, | Performed by: FAMILY MEDICINE

## 2018-09-05 PROCEDURE — 3008F BODY MASS INDEX DOCD: CPT | Mod: CPTII,S$GLB,, | Performed by: FAMILY MEDICINE

## 2018-09-05 PROCEDURE — 99214 OFFICE O/P EST MOD 30 MIN: CPT | Mod: S$GLB,,, | Performed by: FAMILY MEDICINE

## 2018-09-05 PROCEDURE — 3077F SYST BP >= 140 MM HG: CPT | Mod: CPTII,S$GLB,, | Performed by: FAMILY MEDICINE

## 2018-09-05 PROCEDURE — 3079F DIAST BP 80-89 MM HG: CPT | Mod: CPTII,S$GLB,, | Performed by: FAMILY MEDICINE

## 2018-09-05 RX ORDER — LEVOCETIRIZINE DIHYDROCHLORIDE 5 MG/1
1 TABLET, FILM COATED ORAL NIGHTLY PRN
COMMUNITY
Start: 2018-08-13 | End: 2019-07-23

## 2018-09-05 RX ORDER — ALBUTEROL SULFATE 90 UG/1
2 AEROSOL, METERED RESPIRATORY (INHALATION) 2 TIMES DAILY PRN
COMMUNITY
Start: 2018-01-02 | End: 2019-07-23

## 2018-09-05 NOTE — PATIENT INSTRUCTIONS
Eating Heart-Healthy Foods  Eating has a big impact on your heart health. In fact, eating healthier can improve several of your heart risks at once. For instance, it helps you manage weight, cholesterol, and blood pressure. Here are ideas to help you make heart-healthy changes without giving up all the foods and flavors you love.  Getting started  · Talk with your health care provider about eating plans, such as the DASH or Mediterranean diet. You may also be referred to a dietitian.  · Change a few things at a time. Give yourself time to get used to a few eating changes before adding more.  · Work to create a tasty, healthy eating plan that you can stick to for the rest of your life.    Goals for healthy eating  Below are some tips to improve your eating habits:  · Limit saturated fats and trans fats. Saturated fats raise your levels of cholesterol, so keep these fats to a minimum. They are found in foods such as fatty meats, whole milk, cheese, and palm and coconut oils. Avoid trans fats because they lower good cholesterol as well as raise bad cholesterol. Trans fats are most often found in processed foods.  · Reduce sodium (salt) intake. Eating too much salt may increase your blood pressure. Limit your sodium intake to 2,300 milligrams (mg) per day, or less if your health care provider recommends it. Dining out less often and eating fewer processed foods are two great ways to decrease the amount of salt you consume.  · Managing calories. A calorie is a unit of energy. Your body burns calories for fuel, but if you eat more calories than your body burns, the extras are stored as fat. Your health care provider can help you create a diet plan to manage your calories. This will likely include eating healthier foods as well as exercising regularly. To help you track your progress, keep a diary to record what you eat and how often you exercise.  Choose the right foods  Aim to make these foods staples of your diet. If  you have diabetes, you may have different recommendations than what is listed here:  · Fruits and vegetable provide plenty of nutrients without a lot of calories. At meals, fill half your plate with these foods. Split the other half of your plate between whole grains and lean protein.  · Whole grains are high in fiber and rich in vitamins and nutrients. Good choices include whole-wheat bread, pasta, and brown rice.  · Lean proteins give you nutrition with less fat. Good choices include fish, skinless chicken, and beans.  · Low-fat or nonfat dairy provides nutrients without a lot of fat. Try low-fat or nonfat milk, cheese, or yogurt.  · Healthy fats can be good for you in small amounts. These are unsaturated fats, such as olive oil, nuts, and fish. Try to have at least 2 servings per week of fatty fish such as salmon, sardines, mackerel, rainbow trout, and albacore tuna. These contain omega-3 fatty acids, which are good for your heart. Flaxseed is another source of a heart-healthy fat.  More on heart healthy eating    Read food labels  Healthy eating starts at the grocery store. Be sure to pay attention to food labels on packaged foods. Look for products that are high in fiber and protein, and low in saturated fat, cholesterol, and sodium. Avoid products that contain trans fat. And pay close attention to serving size. For instance, if you plan to eat two servings, double all the numbers on the label.  Prepare food right  A key part of healthy cooking is cutting down on added fat and salt. Look on the internet for lower-fat, lower-sodium recipes. Also, try these tips:  · Remove fat from meat and skin from poultry before cooking.  · Skim fat from the surface of soups and sauces.  · Broil, boil, bake, steam, grill, and microwave food without added fats.  · Choose ingredients that spice up your food without adding calories, fat, or sodium. Try these items: horseradish, hot sauce, lemon, mustard, nonfat salad dressings,  and vinegar. For salt-free herbs and spices, try basil, cilantro, cinnamon, pepper, and rosemary.  Date Last Reviewed: 6/25/2015  © 4401-6611 [employer]. [address] [place] [address], [place] [address]. All rights reserved. This information is not intended as a substitute for professional medical care. Always follow your healthcare professional's instructions.        Low-Salt Diet  This diet removes foods that are high in salt. It also limits the amount of salt you use when cooking. It is most often used for people with high blood pressure, edema (fluid retention), and kidney, liver, or heart disease.  Table salt contains the mineral sodium. Your body needs sodium to work normally. But too much sodium can make your health problems worse. Your healthcare provider is recommending a low-salt (also called low-sodium) diet for you. Your total daily allowance of salt is 1,500 to 2,300 milligrams (mg). It is less than 1 teaspoon of table salt. This means you can have only about 500 to 700 mg of sodium at each meal. People with certain health problems should limit salt intake to the lower end of the recommended range.    When you cook, dont add much salt. If you can cook without using salt, even better. Dont add salt to your food at the table.  When shopping, read food labels. Salt is often called sodium on the label. Choose foods that are salt-free, low salt, or very low salt. Note that foods with reduced salt may not lower your salt intake enough.    Beans, potatoes, and pasta  Ok: Dry beans, split peas, lentils, potatoes, rice, macaroni, pasta, spaghetti without added salt  Avoid: Potato chips, tortilla chips, and similar products  Breads and cereals  Ok: Low-sodium breads, rolls, cereals, and cakes; low-salt crackers, matzo crackers  Avoid: Salted crackers, pretzels, popcorn, Serbian toast, pancakes, muffins  Dairy  Ok: Milk, chocolate milk, hot chocolate mix, low-salt cheeses, and yogurt  Avoid: Processed  cheese and cheese spreads; Roquefort, Camembert, and cottage cheese; buttermilk, instant breakfast drink  Desserts  Ok: Ice cream, frozen yogurt, juice bars, gelatin, cookies and pies, sugar, honey, jelly, hard candy  Avoid: Most pies, cakes and cookies prepared or processed with salt; instant pudding  Drinks  Ok: Tea, coffee, fizzy (carbonated) drinks, juices  Avoid: Flavored coffees, electrolyte replacement drinks, sports drinks  Meats  Ok: All fresh meat, fish, poultry, low-salt tuna, eggs, egg substitute  Avoid: Smoked, pickled, brine-cured, or salted meats and fish. This includes peter, chipped beef, corned beef, hot dogs, deli meats, ham, kosher meats, salt pork, sausage, canned tuna, salted codfish, smoked salmon, herring, sardines, or anchovies.  Seasonings and spices  Ok: Most seasonings are okay. Good substitutes for salt include: fresh herb blends, hot sauce, lemon, garlic, gross, vinegar, dry mustard, parsley, cilantro, horseradish, tomato paste, regular margarine, mayonnaise, unsalted butter, cream cheese, vegetable oil, cream, low-salt salad dressing and gravy.  Avoid: Regular ketchup, relishes, pickles, soy sauce, teriyaki sauce, Worcestershire sauce, BBQ sauce, tartar sauce, meat tenderizer, chili sauce, regular gravy, regular salad dressing, salted butter  Soups  Ok: Low-salt soups and broths made with allowed foods  Avoid: Bouillon cubes, soups with smoked or salted meats, regular soup and broth  Vegetables  Ok: Most vegetables are okay; also low-salt tomato and vegetable juices  Avoid: Sauerkraut and other brine-soaked vegetables; pickles and other pickled vegetables; tomato juice, olives  Date Last Reviewed: 8/1/2016  © 1600-0834 [employer]. [address] [place] [address], [place] [address]. All rights reserved. This information is not intended as a substitute for professional medical care. Always follow your healthcare professional's instructions.

## 2018-09-05 NOTE — PROGRESS NOTES
Subjective:       Patient ID: Karlos Clark is a 33 y.o. male.    Chief Complaint: Establish Care     The patient also complains of having problems with sinuses with congestion, pressure in the sinuses, postnasal drip, he has an appointment to see the ENT specialist next week, the patient states that he has been taking Xyzal, fluticasone 1 spray in each nostril at nighttime and also azelastine 1 spray twice daily in each nostril but the symptoms are not improved. The patient stated also that he has been checking the glucose in the morning was 50 in 1 opportunity, he has been feeling very tired.      Hypertension   This is a chronic problem. The current episode started more than 1 year ago. The problem has been gradually worsening since onset. The problem is uncontrolled. Associated symptoms include malaise/fatigue. Pertinent negatives include no blurred vision, chest pain, headaches, neck pain, orthopnea, palpitations, peripheral edema, PND, shortness of breath or sweats. There are no associated agents to hypertension. Risk factors for coronary artery disease include dyslipidemia, obesity, male gender, family history and sedentary lifestyle. Past treatments include beta blockers and angiotensin blockers. The current treatment provides moderate improvement. Compliance problems include diet and exercise.  There is no history of angina, kidney disease, CAD/MI, CVA, heart failure, left ventricular hypertrophy, PVD or retinopathy. There is no history of chronic renal disease or a hypertension causing med.   Hyperlipidemia   This is a chronic problem. The current episode started more than 1 year ago. The problem is uncontrolled. Recent lipid tests were reviewed and are high. Exacerbating diseases include obesity. He has no history of chronic renal disease, diabetes, hypothyroidism, liver disease or nephrotic syndrome. Factors aggravating his hyperlipidemia include beta blockers. Pertinent negatives include no chest pain,  focal weakness, leg pain or shortness of breath. He is currently on no antihyperlipidemic treatment. Compliance problems include adherence to diet and adherence to exercise.  Risk factors for coronary artery disease include dyslipidemia, hypertension, male sex, obesity and family history.   Fatigue   This is a new problem. The current episode started more than 1 month ago. The problem occurs constantly. The problem has been gradually worsening. Associated symptoms include fatigue. Pertinent negatives include no abdominal pain, arthralgias, chest pain, congestion, headaches, neck pain, rash, swollen glands, urinary symptoms or vertigo. Nothing aggravates the symptoms. He has tried nothing for the symptoms.     Review of Systems   Constitutional: Positive for fatigue and malaise/fatigue. Negative for activity change and appetite change.   HENT: Negative for congestion and ear discharge.    Eyes: Negative for blurred vision, discharge and itching.   Respiratory: Negative for choking, chest tightness and shortness of breath.    Cardiovascular: Negative for chest pain, palpitations, orthopnea, leg swelling and PND.   Gastrointestinal: Negative for abdominal distention and abdominal pain.   Endocrine: Negative for cold intolerance and heat intolerance.   Genitourinary: Negative for dysuria and flank pain.   Musculoskeletal: Negative for arthralgias, back pain and neck pain.   Skin: Negative for pallor and rash.   Allergic/Immunologic: Negative for environmental allergies and food allergies.   Neurological: Negative for dizziness, vertigo, focal weakness, facial asymmetry and headaches.   Hematological: Negative for adenopathy. Does not bruise/bleed easily.   Psychiatric/Behavioral: Negative for agitation, confusion, decreased concentration and sleep disturbance.       Objective:      Physical Exam   Constitutional: He appears well-developed and well-nourished. No distress.   HENT:   Head: Normocephalic and atraumatic.    Right Ear: External ear normal.   Left Ear: External ear normal.   Nose: Nose normal.   Mouth/Throat: Mucous membranes are dry. No oropharyngeal exudate.     Postnasal drip   Eyes: Pupils are equal, round, and reactive to light. Left eye exhibits no discharge. No scleral icterus.   Neck: Normal range of motion. Neck supple. No tracheal deviation present. No thyromegaly present.   Cardiovascular: Normal rate, regular rhythm and normal heart sounds. Exam reveals no friction rub.   No murmur heard.  Pulmonary/Chest: Effort normal and breath sounds normal. No respiratory distress. He has no wheezes.   Musculoskeletal: He exhibits no edema or tenderness.   Neurological: He displays normal reflexes. No cranial nerve deficit. He exhibits normal muscle tone. Coordination normal.   Skin: Skin is warm and dry. No rash noted. He is not diaphoretic. No erythema. No pallor.   Psychiatric: He has a normal mood and affect. His behavior is normal. Judgment and thought content normal.   Nursing note and vitals reviewed.      Assessment:       1. Essential hypertension    2. Other hyperlipidemia    3. BMI 45.0-49.9, adult    4. Other fatigue    5. Chronic nonseasonal allergic rhinitis due to pollen        Plan:       Essential hypertension:Uncontrolled    Other hyperlipidemia: uncontrolled  -     Ambulatory consult to Bariatric Medicine  -     Lipid panel; Future; Expected date: 09/05/2018    BMI 45.0-49.9, adult: improved  -     INSULIN, RANDOM; Future; Expected date: 09/05/2018    Other fatigue: worsening  -     TSH; Future; Expected date: 09/05/2018  -     CBC auto differential; Future; Expected date: 09/05/2018    Chronic nonseasonal allergic rhinitis due to pollen: worsening     Heart healthy diet, dash diet was recommended for the patient, will call the patient after we have the results of the test, will also check insulin levels, glucose levels checked 4 months ago were normal. Will refer the patient also for  Bariatric  consultation.  If insulin levels are elevated, will start patient on metformin.   The patient was advised to use a medication for allergies as directed, and also use the fluticasone 2 sprays in each nostril instead of 1 spray and continue using nasal saline solution or Neti pot with sterilized water.   The patient was advised to eat healthy, avoid fried foods, red meat and processed starches, eat 3 meals a day, 3 snacks and small portions, decrease carbohydrate intake.  We are not making any changes on the blood pressure medication at this time, if the patient continues to have high blood pressure, and next office visit, will increase the dose of the metoprolol. Patient agreed with assessment and plan. Patient verbalized understanding.

## 2018-09-06 ENCOUNTER — LAB VISIT (OUTPATIENT)
Dept: LAB | Facility: HOSPITAL | Age: 33
End: 2018-09-06
Attending: FAMILY MEDICINE
Payer: COMMERCIAL

## 2018-09-06 DIAGNOSIS — E78.49 OTHER HYPERLIPIDEMIA: ICD-10-CM

## 2018-09-06 DIAGNOSIS — R53.83 OTHER FATIGUE: ICD-10-CM

## 2018-09-06 LAB
BASOPHILS # BLD AUTO: 0.07 K/UL
BASOPHILS NFR BLD: 1.1 %
CHOLEST SERPL-MCNC: 162 MG/DL
CHOLEST/HDLC SERPL: 4.9 {RATIO}
DIFFERENTIAL METHOD: NORMAL
EOSINOPHIL # BLD AUTO: 0.3 K/UL
EOSINOPHIL NFR BLD: 4.3 %
ERYTHROCYTE [DISTWIDTH] IN BLOOD BY AUTOMATED COUNT: 12.6 %
HCT VFR BLD AUTO: 42.6 %
HDLC SERPL-MCNC: 33 MG/DL
HDLC SERPL: 20.4 %
HGB BLD-MCNC: 14.1 G/DL
IMM GRANULOCYTES # BLD AUTO: 0.02 K/UL
IMM GRANULOCYTES NFR BLD AUTO: 0.3 %
INSULIN COLLECTION INTERVAL: NORMAL
INSULIN SERPL-ACNC: 22.3 UU/ML
LDLC SERPL CALC-MCNC: 88.6 MG/DL
LYMPHOCYTES # BLD AUTO: 2.2 K/UL
LYMPHOCYTES NFR BLD: 35 %
MCH RBC QN AUTO: 28.3 PG
MCHC RBC AUTO-ENTMCNC: 33.1 G/DL
MCV RBC AUTO: 86 FL
MONOCYTES # BLD AUTO: 0.4 K/UL
MONOCYTES NFR BLD: 6.3 %
NEUTROPHILS # BLD AUTO: 3.4 K/UL
NEUTROPHILS NFR BLD: 53 %
NONHDLC SERPL-MCNC: 129 MG/DL
NRBC BLD-RTO: 0 /100 WBC
PLATELET # BLD AUTO: 190 K/UL
PMV BLD AUTO: 11 FL
RBC # BLD AUTO: 4.98 M/UL
TRIGL SERPL-MCNC: 202 MG/DL
TSH SERPL DL<=0.005 MIU/L-ACNC: 1.11 UIU/ML
WBC # BLD AUTO: 6.34 K/UL

## 2018-09-06 PROCEDURE — 84443 ASSAY THYROID STIM HORMONE: CPT

## 2018-09-06 PROCEDURE — 85025 COMPLETE CBC W/AUTO DIFF WBC: CPT

## 2018-09-06 PROCEDURE — 83525 ASSAY OF INSULIN: CPT

## 2018-09-06 PROCEDURE — 80061 LIPID PANEL: CPT

## 2018-09-06 PROCEDURE — 36415 COLL VENOUS BLD VENIPUNCTURE: CPT | Mod: PO

## 2018-10-03 ENCOUNTER — PATIENT MESSAGE (OUTPATIENT)
Dept: FAMILY MEDICINE | Facility: CLINIC | Age: 33
End: 2018-10-03

## 2018-10-03 DIAGNOSIS — K21.9 GASTROESOPHAGEAL REFLUX DISEASE, ESOPHAGITIS PRESENCE NOT SPECIFIED: Primary | ICD-10-CM

## 2018-10-03 RX ORDER — PANTOPRAZOLE SODIUM 40 MG/1
40 TABLET, DELAYED RELEASE ORAL DAILY
Qty: 90 TABLET | Refills: 1 | Status: SHIPPED | OUTPATIENT
Start: 2018-10-03 | End: 2019-06-04 | Stop reason: SDUPTHER

## 2018-11-20 DIAGNOSIS — R68.89 FLU-LIKE SYMPTOMS: Primary | ICD-10-CM

## 2019-03-20 NOTE — TELEPHONE ENCOUNTER
Dr. Devang Harrison office calling stated they needed notes, labs, and any imaging sent over before they will schedule the patient to be seen.     pls advise Please advise

## 2019-06-04 DIAGNOSIS — K21.9 GASTROESOPHAGEAL REFLUX DISEASE, ESOPHAGITIS PRESENCE NOT SPECIFIED: ICD-10-CM

## 2019-06-04 RX ORDER — PANTOPRAZOLE SODIUM 40 MG/1
TABLET, DELAYED RELEASE ORAL
Qty: 90 TABLET | Refills: 1 | Status: SHIPPED | OUTPATIENT
Start: 2019-06-04 | End: 2019-07-23

## 2019-07-11 ENCOUNTER — TELEPHONE (OUTPATIENT)
Dept: FAMILY MEDICINE | Facility: CLINIC | Age: 34
End: 2019-07-11

## 2019-07-23 ENCOUNTER — OFFICE VISIT (OUTPATIENT)
Dept: FAMILY MEDICINE | Facility: CLINIC | Age: 34
End: 2019-07-23
Payer: COMMERCIAL

## 2019-07-23 VITALS
BODY MASS INDEX: 44.01 KG/M2 | HEIGHT: 70 IN | TEMPERATURE: 98 F | WEIGHT: 307.38 LBS | DIASTOLIC BLOOD PRESSURE: 90 MMHG | HEART RATE: 96 BPM | SYSTOLIC BLOOD PRESSURE: 138 MMHG

## 2019-07-23 DIAGNOSIS — F32.A ANXIETY AND DEPRESSION: ICD-10-CM

## 2019-07-23 DIAGNOSIS — Z79.899 ENCOUNTER FOR LONG-TERM (CURRENT) USE OF MEDICATIONS: ICD-10-CM

## 2019-07-23 DIAGNOSIS — I10 HYPERTENSION, ESSENTIAL: ICD-10-CM

## 2019-07-23 DIAGNOSIS — Z76.89 ENCOUNTER TO ESTABLISH CARE: Primary | ICD-10-CM

## 2019-07-23 DIAGNOSIS — R68.89 COLD INTOLERANCE OF HAND: ICD-10-CM

## 2019-07-23 DIAGNOSIS — R53.83 OTHER FATIGUE: ICD-10-CM

## 2019-07-23 DIAGNOSIS — F41.9 ANXIETY AND DEPRESSION: ICD-10-CM

## 2019-07-23 PROBLEM — J30.2 CHRONIC SEASONAL ALLERGIC RHINITIS: Status: ACTIVE | Noted: 2017-06-28

## 2019-07-23 PROBLEM — Z13.29 THYROID DISORDER SCREEN: Status: ACTIVE | Noted: 2017-06-30

## 2019-07-23 PROBLEM — R06.83 SNORING: Status: ACTIVE | Noted: 2017-06-28

## 2019-07-23 PROBLEM — M21.40 FLAT FOOT: Status: ACTIVE | Noted: 2017-06-30

## 2019-07-23 PROBLEM — G47.8 UNREFRESHED BY SLEEP: Status: ACTIVE | Noted: 2017-06-28

## 2019-07-23 PROBLEM — M25.561 CHRONIC PAIN OF RIGHT KNEE: Status: ACTIVE | Noted: 2017-06-30

## 2019-07-23 PROBLEM — Z00.00 ANNUAL PHYSICAL EXAM: Status: ACTIVE | Noted: 2017-06-30

## 2019-07-23 PROBLEM — G89.29 CHRONIC PAIN OF RIGHT KNEE: Status: ACTIVE | Noted: 2017-06-30

## 2019-07-23 PROBLEM — J32.1 CHRONIC FRONTAL SINUSITIS: Status: ACTIVE | Noted: 2018-01-15

## 2019-07-23 PROBLEM — R09.81 NASAL CONGESTION: Status: ACTIVE | Noted: 2017-11-08

## 2019-07-23 PROBLEM — J32.3 CHRONIC SPHENOIDAL SINUSITIS: Status: ACTIVE | Noted: 2018-01-15

## 2019-07-23 PROCEDURE — 99214 PR OFFICE/OUTPT VISIT, EST, LEVL IV, 30-39 MIN: ICD-10-PCS | Mod: S$GLB,,, | Performed by: FAMILY MEDICINE

## 2019-07-23 PROCEDURE — 3008F PR BODY MASS INDEX (BMI) DOCUMENTED: ICD-10-PCS | Mod: CPTII,S$GLB,, | Performed by: FAMILY MEDICINE

## 2019-07-23 PROCEDURE — 3008F BODY MASS INDEX DOCD: CPT | Mod: CPTII,S$GLB,, | Performed by: FAMILY MEDICINE

## 2019-07-23 PROCEDURE — 99214 OFFICE O/P EST MOD 30 MIN: CPT | Mod: S$GLB,,, | Performed by: FAMILY MEDICINE

## 2019-07-23 PROCEDURE — 3075F SYST BP GE 130 - 139MM HG: CPT | Mod: CPTII,S$GLB,, | Performed by: FAMILY MEDICINE

## 2019-07-23 PROCEDURE — 3075F PR MOST RECENT SYSTOLIC BLOOD PRESS GE 130-139MM HG: ICD-10-PCS | Mod: CPTII,S$GLB,, | Performed by: FAMILY MEDICINE

## 2019-07-23 PROCEDURE — 99999 PR PBB SHADOW E&M-EST. PATIENT-LVL IV: ICD-10-PCS | Mod: PBBFAC,,, | Performed by: FAMILY MEDICINE

## 2019-07-23 PROCEDURE — 99999 PR PBB SHADOW E&M-EST. PATIENT-LVL IV: CPT | Mod: PBBFAC,,, | Performed by: FAMILY MEDICINE

## 2019-07-23 PROCEDURE — 3080F PR MOST RECENT DIASTOLIC BLOOD PRESSURE >= 90 MM HG: ICD-10-PCS | Mod: CPTII,S$GLB,, | Performed by: FAMILY MEDICINE

## 2019-07-23 PROCEDURE — 3080F DIAST BP >= 90 MM HG: CPT | Mod: CPTII,S$GLB,, | Performed by: FAMILY MEDICINE

## 2019-07-23 RX ORDER — FLUTICASONE PROPIONATE 50 MCG
SPRAY, SUSPENSION (ML) NASAL
COMMUNITY
Start: 2010-06-01 | End: 2022-09-09

## 2019-07-23 RX ORDER — BENZONATATE 200 MG/1
CAPSULE ORAL
Refills: 0 | COMMUNITY
Start: 2019-07-15 | End: 2019-08-15

## 2019-07-23 RX ORDER — BENAZEPRIL/HYDROCHLOROTHIAZIDE 20 MG-25MG
1 TABLET ORAL DAILY
Qty: 90 TABLET | Refills: 3 | Status: SHIPPED | OUTPATIENT
Start: 2019-07-23 | End: 2019-09-23 | Stop reason: SDUPTHER

## 2019-07-23 RX ORDER — DOXYCYCLINE 100 MG/1
CAPSULE ORAL
Refills: 0 | COMMUNITY
Start: 2019-07-15 | End: 2019-08-15

## 2019-07-23 RX ORDER — SERTRALINE HYDROCHLORIDE 50 MG/1
50 TABLET, FILM COATED ORAL DAILY
Qty: 30 TABLET | Refills: 11 | Status: SHIPPED | OUTPATIENT
Start: 2019-07-23 | End: 2019-08-30

## 2019-07-23 RX ORDER — ACETAMINOPHEN, DIPHENHYDRAMINE HCL, PHENYLEPHRINE HCL 325; 25; 5 MG/1; MG/1; MG/1
TABLET ORAL
COMMUNITY
Start: 2019-04-01 | End: 2019-08-15

## 2019-07-23 NOTE — PATIENT INSTRUCTIONS

## 2019-07-23 NOTE — PROGRESS NOTES
Patient ID: Karlos Clark is a 33 y.o. male.    Chief Complaint: Establish Care and Hypertension      Problem List Items Addressed This Visit     Hypertension, essential (Chronic)    Overview     Chronic.  Uncontrolled.  Patient on benazepril hydrochlorothiazide combination.  Reports compliance.  No side effects reported.  Denies any chest pain shortness of breath blurred vision.   BP Readings from Last 3 Encounters:   07/23/19 (!) 138/90   09/05/18 (!) 140/88   07/23/18 (!) 138/99              Current Assessment & Plan     Increase blood pressure medications.  Follow-up in 2 weeks.  For blood pressure check         Encounter to establish care - Primary    Overview     Here to establish care today.  Patient has chronic sinus issues under the care of ENT.  Hypertension.  Anxiety depression.  Amongst other symptoms see below.         Encounter for long-term (current) use of medications    Overview     07/23/2019   Patient is on CHRONIC long-term drug therapy for managed conditions. See medication list. Reports compliance.  No side effects reported.  Routine lab work is being monitored.  Patient does  need refills today.     Lab Results   Component Value Date    WBC 6.34 09/06/2018    HGB 14.1 09/06/2018    HCT 42.6 09/06/2018    MCV 86 09/06/2018     09/06/2018      CMP  Sodium   Date Value Ref Range Status   04/20/2018 144 136 - 145 mmol/L Final     Potassium   Date Value Ref Range Status   04/20/2018 4.1 3.5 - 5.1 mmol/L Final     Chloride   Date Value Ref Range Status   04/20/2018 108 95 - 110 mmol/L Final     CO2   Date Value Ref Range Status   04/20/2018 26 23 - 29 mmol/L Final     Glucose   Date Value Ref Range Status   04/20/2018 90 70 - 110 mg/dL Final     BUN, Bld   Date Value Ref Range Status   04/20/2018 14 6 - 20 mg/dL Final     Creatinine   Date Value Ref Range Status   04/20/2018 0.8 0.5 - 1.4 mg/dL Final     Calcium   Date Value Ref Range Status   04/20/2018 9.3 8.7 - 10.5 mg/dL Final      Total Protein   Date Value Ref Range Status   04/20/2018 7.3 6.0 - 8.4 g/dL Final     Albumin   Date Value Ref Range Status   04/20/2018 4.1 3.5 - 5.2 g/dL Final     Total Bilirubin   Date Value Ref Range Status   04/20/2018 0.4 0.1 - 1.0 mg/dL Final     Comment:     For infants and newborns, interpretation of results should be based  on gestational age, weight and in agreement with clinical  observations.  Premature Infant recommended reference ranges:  Up to 24 hours.............<8.0 mg/dL  Up to 48 hours............<12.0 mg/dL  3-5 days..................<15.0 mg/dL  6-29 days.................<15.0 mg/dL       Alkaline Phosphatase   Date Value Ref Range Status   04/20/2018 69 55 - 135 U/L Final     AST   Date Value Ref Range Status   04/20/2018 18 10 - 40 U/L Final     ALT   Date Value Ref Range Status   04/20/2018 26 10 - 44 U/L Final     Anion Gap   Date Value Ref Range Status   04/20/2018 10 8 - 16 mmol/L Final     eGFR if    Date Value Ref Range Status   04/20/2018 >60.0 >60 mL/min/1.73 m^2 Final     eGFR if non    Date Value Ref Range Status   04/20/2018 >60.0 >60 mL/min/1.73 m^2 Final     Comment:     Calculation used to obtain the estimated glomerular filtration  rate (eGFR) is the CKD-EPI equation.        Lab Results   Component Value Date    TSH 1.112 09/06/2018               Current Assessment & Plan     Complete history and physical was completed today.  Complete and thorough medication reconciliation was performed.  Discussed risks and benefits of medications.  Advised patient on orders and health maintenance.  We discussed old records and old labs if available.  Will request any records not available through epic.  Continue current medications listed on your summary sheet.           Other fatigue    Overview     Subacute.  Started weeks to months ago.  Getting worse.  Due for lab work.         Anxiety and depression    Overview     Patient reports chronic anxiety  depression.  Started years ago.  Patient has been on Celexa and Lexapro.  Lexapro made his blood pressure go up so this was stopped.  Patient has appointment with psychologist next month.  Patient like to start on something else in the meantime.  He has not been on Zoloft.         Current Assessment & Plan     Starting Zoloft follow-up with psychologist.          Cold intolerance of hand    Overview     New problem.  Worsening.  Associated with fatigue.                Past Medical History:  Past Medical History:   Diagnosis Date    Depression 12/7/2011    GERD (gastroesophageal reflux disease)     Hypertension 12/7/2011    Obesity 12/7/2011     Past Surgical History:   Procedure Laterality Date    bladder      Pt was having bladder control problems.  Bladder was stretched approx 7 yrs old    SINUS SURGERY       Review of patient's allergies indicates:   Allergen Reactions    No known drug allergies      Current Outpatient Medications on File Prior to Visit   Medication Sig Dispense Refill    azelastine (ASTELIN) 137 mcg (0.1 %) nasal spray INSTILL 1 SPRAY IN EACH NOSTRIL TWO TIMES A DAY AS DIRECTED 30 mL 5    benzonatate (TESSALON) 200 MG capsule TK 1 C PO TID PRN COU FOR UP TO 7 DAYS  0    cetirizine (ZYRTEC) 10 MG tablet Take 10 mg by mouth once daily.      cyanocobalamin, vitamin B-12, 5,000 mcg Subl       doxycycline (MONODOX) 100 MG capsule TK ONE C PO BID FOR 10 DAYS  0    fluticasone propionate (FLONASE ALLERGY RELIEF) 50 mcg/actuation nasal spray       [DISCONTINUED] albuterol 90 mcg/actuation inhaler Inhale 2 puffs into the lungs 2 (two) times daily as needed.      [DISCONTINUED] benazepril-hydrochlorthiazide (LOTENSIN HCT) 20-12.5 mg per tablet TAKE ONE TABLET BY MOUTH EVERY DAY 30 tablet 5    [DISCONTINUED] budesonide (PULMICORT) 0.5 mg/2 mL nebulizer solution       [DISCONTINUED] citalopram (CELEXA) 20 MG tablet TAKE ONE TABLET BY MOUTH EVERY DAY 30 tablet 5    [DISCONTINUED] diclofenac  sodium 1 % Gel Apply 2 g topically 4 (four) times daily. for 10 days 100 g 0    [DISCONTINUED] levocetirizine (XYZAL) 5 MG tablet Take 1 tablet by mouth nightly as needed.      [DISCONTINUED] lovastatin (MEVACOR) 20 MG tablet TAKE ONE TABLET BY MOUTH EVERY DAY 30 tablet 5    [DISCONTINUED] metoprolol succinate (TOPROL-XL) 50 MG 24 hr tablet Take 1 tablet (50 mg total) by mouth once daily. 30 tablet 5    [DISCONTINUED] multivitamin (THERAGRAN) tablet Take 1 tablet by mouth once daily.      [DISCONTINUED] nystatin (MYCOSTATIN) powder Apply topically 2 (two) times daily. 60 g 1    [DISCONTINUED] pantoprazole (PROTONIX) 40 MG tablet TAKE 1 TABLET BY MOUTH ONCE DAILY 90 tablet 1     No current facility-administered medications on file prior to visit.      Social History     Socioeconomic History    Marital status: Single     Spouse name: Not on file    Number of children: Not on file    Years of education: Not on file    Highest education level: Not on file   Occupational History    Not on file   Social Needs    Financial resource strain: Not hard at all    Food insecurity:     Worry: Never true     Inability: Never true    Transportation needs:     Medical: No     Non-medical: No   Tobacco Use    Smoking status: Never Smoker    Smokeless tobacco: Never Used   Substance and Sexual Activity    Alcohol use: Yes     Alcohol/week: 0.6 oz     Types: 1 Shots of liquor per week     Comment: 2 drinks per month    Drug use: No    Sexual activity: Not Currently     Partners: Female     Birth control/protection: Condom   Lifestyle    Physical activity:     Days per week: 3 days     Minutes per session: 60 min    Stress: Only a little   Relationships    Social connections:     Talks on phone: More than three times a week     Gets together: Three times a week     Attends [employer] service: More than 4 times per year     Active member of club or organization: Yes     Attends meetings of clubs or organizations: 1  "to 4 times per year     Relationship status: Never    Other Topics Concern    Not on file   Social History Narrative    Not on file     Family History   Problem Relation Age of Onset    Hypertension Mother     COPD Father     Hypertension Father     No Known Problems Sister     Allergic rhinitis Neg Hx     Allergies Neg Hx     Angioedema Neg Hx     Asthma Neg Hx     Atopy Neg Hx     Eczema Neg Hx     Immunodeficiency Neg Hx     Rhinitis Neg Hx     Urticaria Neg Hx        I have reviewed the complete PMH, social history, surgical history, allergies and medications.  As well as family history.    Review of Systems   Constitutional: Positive for fatigue and unexpected weight change (Weight gain). Negative for chills and fever.   HENT: Positive for sinus pressure ( chronic sinus issues). Negative for ear pain and sore throat.    Eyes: Negative for redness and visual disturbance.   Respiratory: Negative for cough and shortness of breath.    Cardiovascular: Negative for chest pain and palpitations.   Gastrointestinal: Negative for nausea and vomiting.   Endocrine: Positive for cold intolerance. Negative for heat intolerance.   Musculoskeletal: Negative for arthralgias, myalgias and neck pain.   Skin: Negative for rash and wound.   Neurological: Negative for weakness and headaches.   Hematological: Negative for adenopathy. Does not bruise/bleed easily.       Objective:     BP (!) 138/90   Pulse 96   Temp 98.4 °F (36.9 °C) (Oral)   Ht 5' 10" (1.778 m)   Wt (!) 139.4 kg (307 lb 6.4 oz)   BMI 44.11 kg/m²     Physical Exam   Constitutional: He is oriented to person, place, and time. He appears well-developed and well-nourished. No distress.   HENT:   Head: Normocephalic and atraumatic.   Right Ear: Tympanic membrane is erythematous and bulging. A middle ear effusion is present. No decreased hearing is noted.   Left Ear:  No middle ear effusion. No decreased hearing is noted.   Eyes: Pupils are equal, " round, and reactive to light. EOM are normal.   Neck: Normal range of motion. Neck supple.   Cardiovascular: Normal rate, regular rhythm, normal heart sounds and intact distal pulses.   No murmur heard.  Pulmonary/Chest: Effort normal and breath sounds normal. No respiratory distress. He has no wheezes.   Abdominal: Soft. Bowel sounds are normal. He exhibits no distension.   Obese abdomen   Musculoskeletal: Normal range of motion. He exhibits no edema.   Neurological: He is alert and oriented to person, place, and time. No cranial nerve deficit.   Skin: Skin is warm and dry. Capillary refill takes less than 2 seconds.   Psychiatric: He has a normal mood and affect. His behavior is normal.   Nursing note and vitals reviewed.      Assessment:     1. Encounter to establish care    2. Hypertension, essential    3. Encounter for long-term (current) use of medications    4. Other fatigue    5. Anxiety and depression    6. Cold intolerance of hand        Plan:     I have Reviewed and summarized old records.  I have performed thorough medication reconciliation today and discussed risk and benefits of each medication.  I have reviewed labs and discussed with patient.  All questions were answered.  I am requesting old records and will review them once they are available.    Problem List Items Addressed This Visit     Hypertension, essential (Chronic)     Increase blood pressure medications.  Follow-up in 2 weeks.  For blood pressure check         Relevant Medications    benazepril-hydrochlorthiazide (LOTENSIN HCT) 20-25 mg Tab    Other Relevant Orders    CBC auto differential    Comprehensive metabolic panel    Hemoglobin A1c    Lipid panel    TSH    T3, free    T4    Vitamin D    Encounter to establish care - Primary    Relevant Medications    benazepril-hydrochlorthiazide (LOTENSIN HCT) 20-25 mg Tab    Other Relevant Orders    CBC auto differential    Comprehensive metabolic panel    Hemoglobin A1c    Lipid panel    TSH     T3, free    T4    Vitamin D    Vitamin B12    URINALYSIS    Encounter for long-term (current) use of medications     Complete history and physical was completed today.  Complete and thorough medication reconciliation was performed.  Discussed risks and benefits of medications.  Advised patient on orders and health maintenance.  We discussed old records and old labs if available.  Will request any records not available through epic.  Continue current medications listed on your summary sheet.           Relevant Orders    CBC auto differential    Comprehensive metabolic panel    Hemoglobin A1c    Lipid panel    TSH    T3, free    T4    Vitamin D    Other fatigue    Relevant Orders    CBC auto differential    Comprehensive metabolic panel    Hemoglobin A1c    Lipid panel    TSH    T3, free    T4    Vitamin D    Vitamin B12    URINALYSIS    Testosterone    Anxiety and depression     Starting Zoloft follow-up with psychologist.          Relevant Medications    sertraline (ZOLOFT) 50 MG tablet    Cold intolerance of hand          Follow up in about 2 weeks (around 8/6/2019) for 2 weeks for BP check.    DISCLAIMER: This note was compiled by using a speech recognition dictation system and therefore please be aware that typographical / speech recognition errors can and do occur.  Please contact me if you see any errors specifically.    Randal Keenan MD  We Offer Telehealth & Same Day Appointments!   Book your Telehealth appointment with me through my nurse or   Clinic appointments on [employer]!    Office: [phone]          Check out my Facebook Page and Follow Me at: CLICK HERE    Check out my website at [facility] by clicking on: CLICK HERE    To Schedule appointments online, go to [employer]: CLICK HERE     Location: [email or web address]    [address] [place] [address]    FAX: [phone]

## 2019-07-24 ENCOUNTER — PATIENT MESSAGE (OUTPATIENT)
Dept: FAMILY MEDICINE | Facility: CLINIC | Age: 34
End: 2019-07-24

## 2019-07-25 ENCOUNTER — TELEPHONE (OUTPATIENT)
Dept: FAMILY MEDICINE | Facility: CLINIC | Age: 34
End: 2019-07-25

## 2019-07-25 LAB
25(OH)D3+25(OH)D2 SERPL-MCNC: 18.6 NG/ML (ref 30–100)
ALBUMIN SERPL-MCNC: 5 G/DL (ref 3.5–5.5)
ALBUMIN/GLOB SERPL: 1.7 {RATIO} (ref 1.2–2.2)
ALP SERPL-CCNC: 74 IU/L (ref 39–117)
ALT SERPL-CCNC: 18 IU/L (ref 0–44)
AST SERPL-CCNC: 17 IU/L (ref 0–40)
BASOPHILS # BLD AUTO: 0 X10E3/UL (ref 0–0.2)
BASOPHILS NFR BLD AUTO: 0 %
BILIRUB SERPL-MCNC: 0.5 MG/DL (ref 0–1.2)
BUN SERPL-MCNC: 14 MG/DL (ref 6–20)
BUN/CREAT SERPL: 14 (ref 9–20)
CALCIUM SERPL-MCNC: 9.5 MG/DL (ref 8.7–10.2)
CHLORIDE SERPL-SCNC: 103 MMOL/L (ref 96–106)
CHOLEST SERPL-MCNC: 213 MG/DL (ref 100–199)
CO2 SERPL-SCNC: 21 MMOL/L (ref 20–29)
CREAT SERPL-MCNC: 0.97 MG/DL (ref 0.76–1.27)
EOSINOPHIL # BLD AUTO: 0.1 X10E3/UL (ref 0–0.4)
EOSINOPHIL NFR BLD AUTO: 2 %
ERYTHROCYTE [DISTWIDTH] IN BLOOD BY AUTOMATED COUNT: 13.8 % (ref 12.3–15.4)
GLOBULIN SER CALC-MCNC: 3 G/DL (ref 1.5–4.5)
GLUCOSE SERPL-MCNC: 69 MG/DL (ref 65–99)
HBA1C MFR BLD: 5.4 % (ref 4.8–5.6)
HCT VFR BLD AUTO: 42.7 % (ref 37.5–51)
HDLC SERPL-MCNC: 43 MG/DL
HGB BLD-MCNC: 14.3 G/DL (ref 13–17.7)
IMM GRANULOCYTES # BLD AUTO: 0 X10E3/UL (ref 0–0.1)
IMM GRANULOCYTES NFR BLD AUTO: 0 %
LDLC SERPL CALC-MCNC: 139 MG/DL (ref 0–99)
LYMPHOCYTES # BLD AUTO: 2.2 X10E3/UL (ref 0.7–3.1)
LYMPHOCYTES NFR BLD AUTO: 29 %
MCH RBC QN AUTO: 28.3 PG (ref 26.6–33)
MCHC RBC AUTO-ENTMCNC: 33.5 G/DL (ref 31.5–35.7)
MCV RBC AUTO: 85 FL (ref 79–97)
MONOCYTES # BLD AUTO: 0.4 X10E3/UL (ref 0.1–0.9)
MONOCYTES NFR BLD AUTO: 5 %
NEUTROPHILS # BLD AUTO: 4.9 X10E3/UL (ref 1.4–7)
NEUTROPHILS NFR BLD AUTO: 64 %
PLATELET # BLD AUTO: 215 X10E3/UL (ref 150–450)
POTASSIUM SERPL-SCNC: 3.7 MMOL/L (ref 3.5–5.2)
PROT SERPL-MCNC: 8 G/DL (ref 6–8.5)
RBC # BLD AUTO: 5.05 X10E6/UL (ref 4.14–5.8)
SODIUM SERPL-SCNC: 143 MMOL/L (ref 134–144)
T3FREE SERPL-MCNC: 3.6 PG/ML (ref 2–4.4)
T4 SERPL-MCNC: 7.8 UG/DL (ref 4.5–12)
TESTOST SERPL-MCNC: 221 NG/DL (ref 264–916)
TRIGL SERPL-MCNC: 156 MG/DL (ref 0–149)
TSH SERPL DL<=0.005 MIU/L-ACNC: 1.29 UIU/ML (ref 0.45–4.5)
VIT B12 SERPL-MCNC: >2000 PG/ML (ref 232–1245)
VLDLC SERPL CALC-MCNC: 31 MG/DL (ref 5–40)
WBC # BLD AUTO: 7.6 X10E3/UL (ref 3.4–10.8)

## 2019-07-25 NOTE — PROGRESS NOTES
Please call patient with lab results.  Schedule patient for lab follow-up soon.  Start vitamin-D protocol.

## 2019-07-25 NOTE — TELEPHONE ENCOUNTER
----- Message from Randal Keenan MD sent at 7/25/2019 11:39 AM CDT -----  Please call patient with lab results.  Schedule patient for lab follow-up soon.  Start vitamin-D protocol.

## 2019-08-15 ENCOUNTER — PATIENT MESSAGE (OUTPATIENT)
Dept: ADMINISTRATIVE | Facility: OTHER | Age: 34
End: 2019-08-15

## 2019-08-15 ENCOUNTER — OFFICE VISIT (OUTPATIENT)
Dept: FAMILY MEDICINE | Facility: CLINIC | Age: 34
End: 2019-08-15
Payer: COMMERCIAL

## 2019-08-15 VITALS
HEIGHT: 70 IN | SYSTOLIC BLOOD PRESSURE: 150 MMHG | HEART RATE: 100 BPM | WEIGHT: 307 LBS | BODY MASS INDEX: 43.95 KG/M2 | DIASTOLIC BLOOD PRESSURE: 87 MMHG | TEMPERATURE: 98 F

## 2019-08-15 DIAGNOSIS — E34.9 HYPOTESTOSTERONEMIA: ICD-10-CM

## 2019-08-15 DIAGNOSIS — E55.9 VITAMIN D DEFICIENCY: ICD-10-CM

## 2019-08-15 DIAGNOSIS — Z12.5 ENCOUNTER FOR PROSTATE CANCER SCREENING: ICD-10-CM

## 2019-08-15 DIAGNOSIS — I10 HYPERTENSION, ESSENTIAL: Primary | ICD-10-CM

## 2019-08-15 DIAGNOSIS — R74.8 ELEVATED VITAMIN B12 LEVEL: ICD-10-CM

## 2019-08-15 DIAGNOSIS — E78.00 HYPERCHOLESTEROLEMIA: ICD-10-CM

## 2019-08-15 PROCEDURE — 3079F DIAST BP 80-89 MM HG: CPT | Mod: CPTII,S$GLB,, | Performed by: FAMILY MEDICINE

## 2019-08-15 PROCEDURE — 99999 PR PBB SHADOW E&M-EST. PATIENT-LVL III: ICD-10-PCS | Mod: PBBFAC,,, | Performed by: FAMILY MEDICINE

## 2019-08-15 PROCEDURE — 3077F SYST BP >= 140 MM HG: CPT | Mod: CPTII,S$GLB,, | Performed by: FAMILY MEDICINE

## 2019-08-15 PROCEDURE — 3079F PR MOST RECENT DIASTOLIC BLOOD PRESSURE 80-89 MM HG: ICD-10-PCS | Mod: CPTII,S$GLB,, | Performed by: FAMILY MEDICINE

## 2019-08-15 PROCEDURE — 99214 OFFICE O/P EST MOD 30 MIN: CPT | Mod: 25,S$GLB,, | Performed by: FAMILY MEDICINE

## 2019-08-15 PROCEDURE — 3077F PR MOST RECENT SYSTOLIC BLOOD PRESSURE >= 140 MM HG: ICD-10-PCS | Mod: CPTII,S$GLB,, | Performed by: FAMILY MEDICINE

## 2019-08-15 PROCEDURE — 3008F PR BODY MASS INDEX (BMI) DOCUMENTED: ICD-10-PCS | Mod: CPTII,S$GLB,, | Performed by: FAMILY MEDICINE

## 2019-08-15 PROCEDURE — 96372 THER/PROPH/DIAG INJ SC/IM: CPT | Mod: S$GLB,,, | Performed by: FAMILY MEDICINE

## 2019-08-15 PROCEDURE — 96372 PR INJECTION,THERAP/PROPH/DIAG2ST, IM OR SUBCUT: ICD-10-PCS | Mod: S$GLB,,, | Performed by: FAMILY MEDICINE

## 2019-08-15 PROCEDURE — 99214 PR OFFICE/OUTPT VISIT, EST, LEVL IV, 30-39 MIN: ICD-10-PCS | Mod: 25,S$GLB,, | Performed by: FAMILY MEDICINE

## 2019-08-15 PROCEDURE — 99999 PR PBB SHADOW E&M-EST. PATIENT-LVL III: CPT | Mod: PBBFAC,,, | Performed by: FAMILY MEDICINE

## 2019-08-15 PROCEDURE — 3008F BODY MASS INDEX DOCD: CPT | Mod: CPTII,S$GLB,, | Performed by: FAMILY MEDICINE

## 2019-08-15 RX ORDER — TESTOSTERONE CYPIONATE 200 MG/ML
50 INJECTION, SOLUTION INTRAMUSCULAR
Status: DISCONTINUED | OUTPATIENT
Start: 2019-08-16 | End: 2019-10-31

## 2019-08-15 RX ORDER — ERGOCALCIFEROL 1.25 MG/1
50000 CAPSULE ORAL
Qty: 4 CAPSULE | Refills: 3 | Status: SHIPPED | OUTPATIENT
Start: 2019-08-15 | End: 2020-01-11

## 2019-08-15 RX ORDER — LOVASTATIN 20 MG/1
20 TABLET ORAL NIGHTLY
Qty: 30 TABLET | Refills: 11 | Status: SHIPPED | OUTPATIENT
Start: 2019-08-15 | End: 2019-09-19

## 2019-08-15 RX ORDER — TESTOSTERONE CYPIONATE 200 MG/ML
50 INJECTION, SOLUTION INTRAMUSCULAR ONCE
Status: COMPLETED | OUTPATIENT
Start: 2019-08-15 | End: 2019-08-15

## 2019-08-15 RX ORDER — AMLODIPINE BESYLATE 5 MG/1
5 TABLET ORAL DAILY
Qty: 30 TABLET | Refills: 11 | Status: SHIPPED | OUTPATIENT
Start: 2019-08-15 | End: 2019-10-31

## 2019-08-15 RX ORDER — TESTOSTERONE CYPIONATE 200 MG/ML
50 INJECTION, SOLUTION INTRAMUSCULAR ONCE
Status: DISCONTINUED | OUTPATIENT
Start: 2019-08-15 | End: 2019-08-15

## 2019-08-15 RX ADMIN — TESTOSTERONE CYPIONATE 50 MG: 200 INJECTION, SOLUTION INTRAMUSCULAR at 06:08

## 2019-08-15 NOTE — Clinical Note
Make sure patient has 2 week blood pressure follow-up visit.  Also make sure patient signing up for digital medicine hypertension program

## 2019-08-15 NOTE — LETTER
August 18, 2019      Alyson Silva MD  [address] [facility] [address] [place] [address]           [place] [id number] [facility] [address]  Phone: [phone]  Fax: [phone]          Patient: Karlos Clark   MR Number: [id number]   YOB: 1985   Date of Visit: 8/15/2019       Dear Dr. Alyson Silva:    Thank you for referring Karlos Clark to me for evaluation. Attached you will find relevant portions of my assessment and plan of care.    If you have questions, please do not hesitate to call me. I look forward to following Karlos Clark along with you.    Sincerely,    Randal Keenan MD    Enclosure  CC:  No Recipients    If you would like to receive this communication electronically, please contact [email or web address] or [phone] to request more information on [employer] Link access.    For providers and/or their staff who would like to refer a patient to [facility], please contact us through our one-stop-shop provider referral line, [facility], at [phone].    If you feel you have received this communication in error or would no longer like to receive these types of communications, please e-mail [email or web address]

## 2019-08-18 PROBLEM — R79.89 ELEVATED VITAMIN B12 LEVEL: Status: ACTIVE | Noted: 2019-08-18

## 2019-08-18 PROBLEM — R74.8 ELEVATED VITAMIN B12 LEVEL: Status: ACTIVE | Noted: 2019-08-18

## 2019-08-18 NOTE — ASSESSMENT & PLAN NOTE
Restart lovastatin.  Recheck special lipid panel in 3 to 6 months.    Discussed hyperlipidemia disease course.  Discussed the risk of cardiovascular disease, increase stroke and heart attack risk.  Patient voiced understanding and understood the treatment plan. All questions were answered.  Discussed healthy diet and increased need for exercise.

## 2019-08-18 NOTE — PROGRESS NOTES
Subjective:      Patient ID: Karlos Clark is a 33 y.o. male.    Chief Complaint: Follow-up (lab results) and Fatigue      Problem List Items Addressed This Visit     Hypertension, essential - Primary (Chronic)    Overview     Initial HPI:  Chronic.  Uncontrolled.  Patient on benazepril hydrochlorothiazide combination.  Reports compliance.  No side effects reported.  Denies any chest pain shortness of breath blurred vision.   BP Readings from Last 3 Encounters:   07/23/19 (!) 138/90   09/05/18 (!) 140/88   07/23/18 (!) 138/99     Update August 15, 2019.  Blood pressure still uncontrolled.  Patient reports compliance with medications.  Patient reports increased stress after getting of work today.         Current Assessment & Plan     Signing patient for digital medicine hypertension program today.    Blood pressure medications were increased last visit.  Adding amlodipine at this visit.  Repeat blood pressure check in 2 weeks.    Discussed hypertension disease course.  Discussed-DASH-diet and exercise.  Discussed medication regimen importance of treating high blood pressure.  Patient understood and advised of risk of untreated blood pressure.  ER precautions were given for symptoms of hypertensive urgency and emergency.           Hypercholesterolemia    Overview     Chronic.  Uncontrolled.  Patient not on medications currently.  Patient has been on lovastatin in the past.  Not adherent to diet.    Lab Results   Component Value Date    CHOL 213 (H) 07/24/2019    CHOL 162 09/06/2018    CHOL 181 04/20/2018     Lab Results   Component Value Date    HDL 43 07/24/2019    HDL 33 (L) 09/06/2018    HDL 37 (L) 04/20/2018     Lab Results   Component Value Date    LDLCALC 139 (H) 07/24/2019    LDLCALC 88.6 09/06/2018    LDLCALC 105.4 04/20/2018     Lab Results   Component Value Date    TRIG 156 (H) 07/24/2019    TRIG 202 (H) 09/06/2018    TRIG 193 (H) 04/20/2018     Lab Results   Component Value Date    CHOLHDL 20.4 09/06/2018     CHOLHDL 20.4 04/20/2018    CHOLHDL 15.3 (L) 06/28/2011              Current Assessment & Plan     Restart lovastatin.  Recheck special lipid panel in 3 to 6 months.    Discussed hyperlipidemia disease course.  Discussed the risk of cardiovascular disease, increase stroke and heart attack risk.  Patient voiced understanding and understood the treatment plan. All questions were answered.  Discussed healthy diet and increased need for exercise.         Encounter for prostate cancer screening    Overview     No results found for: PSA  Due for PSA screening  On testosterone replacement therapy         Vitamin D deficiency    Overview     Chronic.  Untreated.  Patient taking vitamin-D supplementation.  Last vitamin-D level was low.         Current Assessment & Plan     Continue supplementation.  Recheck in 3 months.         Hypotestosteronemia    Overview     Chronic  .  Untreated.  Lab Results   Component Value Date    TESTOSTERONE 221 (L) 07/24/2019     Patient interested in starting replacement.         Current Assessment & Plan     Associated with fatigue.  Chronic.  Starting testosterone 50 mg every 2 weeks.  Recheck level in 3 months.  Advised of risk and benefits of testosterone hormone replacement.         Elevated vitamin B12 level    Overview     Patient had B12 level greater than 2000.  Patient reports excessive levels of B12 intake.  Advised of risk of vitamin B12 toxicity.         Current Assessment & Plan     Decreased vitamin B12 supplementation of 1000 daily.  Recheck level in 3 months.                Past Medical History:  Past Medical History:   Diagnosis Date    Depression 12/7/2011    GERD (gastroesophageal reflux disease)     Hypertension 12/7/2011    Obesity 12/7/2011     Past Surgical History:   Procedure Laterality Date    bladder      Pt was having bladder control problems.  Bladder was stretched approx 7 yrs old    SINUS SURGERY       Review of patient's allergies indicates:   Allergen  Reactions    No known drug allergies      Medication List with Changes/Refills   New Medications    AMLODIPINE (NORVASC) 5 MG TABLET    Take 1 tablet (5 mg total) by mouth once daily.    ERGOCALCIFEROL (ERGOCALCIFEROL) 50,000 UNIT CAP    Take 1 capsule (50,000 Units total) by mouth every 7 days.    LOVASTATIN (MEVACOR) 20 MG TABLET    Take 1 tablet (20 mg total) by mouth every evening.   Current Medications    AZELASTINE (ASTELIN) 137 MCG (0.1 %) NASAL SPRAY    INSTILL 1 SPRAY IN EACH NOSTRIL TWO TIMES A DAY AS DIRECTED    BENAZEPRIL-HYDROCHLORTHIAZIDE (LOTENSIN HCT) 20-25 MG TAB    Take 1 tablet by mouth once daily.    CETIRIZINE (ZYRTEC) 10 MG TABLET    Take 10 mg by mouth once daily.    FLUTICASONE PROPIONATE (FLONASE ALLERGY RELIEF) 50 MCG/ACTUATION NASAL SPRAY        SERTRALINE (ZOLOFT) 50 MG TABLET    Take 1 tablet (50 mg total) by mouth once daily.   Discontinued Medications    BENZONATATE (TESSALON) 200 MG CAPSULE    TK 1 C PO TID PRN COU FOR UP TO 7 DAYS    CYANOCOBALAMIN, VITAMIN B-12, 5,000 MCG SUBL        DOXYCYCLINE (MONODOX) 100 MG CAPSULE    TK ONE C PO BID FOR 10 DAYS      Social History     Tobacco Use    Smoking status: Never Smoker    Smokeless tobacco: Never Used   Substance Use Topics    Alcohol use: Yes     Alcohol/week: 0.6 oz     Types: 1 Shots of liquor per week     Frequency: Monthly or less     Drinks per session: 1 or 2     Binge frequency: Less than monthly     Comment: 2 drinks per month      Family History   Problem Relation Age of Onset    Hypertension Mother     COPD Father     Hypertension Father     No Known Problems Sister     Allergic rhinitis Neg Hx     Allergies Neg Hx     Angioedema Neg Hx     Asthma Neg Hx     Atopy Neg Hx     Eczema Neg Hx     Immunodeficiency Neg Hx     Rhinitis Neg Hx     Urticaria Neg Hx        I have reviewed the complete PMH, social history, surgical history, allergies and medications.  As well as family history.    Review of Systems  "  Constitutional: Positive for fatigue. Negative for chills, fever and unexpected weight change.   HENT: Positive for sinus pressure ( chronic sinus issues). Negative for ear pain and sore throat.    Eyes: Negative for redness and visual disturbance.   Respiratory: Negative for cough and shortness of breath.    Cardiovascular: Negative for chest pain and palpitations.   Gastrointestinal: Negative for nausea and vomiting.   Endocrine: Negative for cold intolerance and heat intolerance.   Genitourinary: Negative for difficulty urinating and hematuria.   Musculoskeletal: Negative for arthralgias, myalgias and neck pain.   Skin: Negative for rash and wound.   Allergic/Immunologic: Negative for environmental allergies and food allergies.   Neurological: Negative for weakness and headaches.   Hematological: Negative for adenopathy. Does not bruise/bleed easily.   Psychiatric/Behavioral: Negative for sleep disturbance. The patient is not nervous/anxious.        Objective:     BP (!) 150/87   Pulse 100   Temp 97.9 °F (36.6 °C) (Oral)   Ht 5' 10" (1.778 m)   Wt (!) 139.3 kg (307 lb)   BMI 44.05 kg/m²     Physical Exam   Constitutional: He is oriented to person, place, and time. He appears well-developed and well-nourished. No distress.   HENT:   Head: Normocephalic and atraumatic.   Right Ear: Tympanic membrane is not erythematous and not bulging. No decreased hearing is noted.   Left Ear: Tympanic membrane is not erythematous and not bulging. No decreased hearing is noted.   Eyes: Pupils are equal, round, and reactive to light. EOM are normal.   Neck: Normal range of motion. Neck supple.   Cardiovascular: Normal rate, regular rhythm, normal heart sounds and intact distal pulses.   No murmur heard.  Pulmonary/Chest: Effort normal and breath sounds normal. No respiratory distress. He has no wheezes.   Abdominal: Soft. Bowel sounds are normal. He exhibits no distension.   Obese abdomen   Musculoskeletal: Normal range of " motion. He exhibits no edema.   Neurological: He is alert and oriented to person, place, and time. No cranial nerve deficit.   Skin: Skin is warm and dry. Capillary refill takes less than 2 seconds.   Psychiatric: He has a normal mood and affect. His behavior is normal.   Nursing note and vitals reviewed.      Assessment:     1. Hypertension, essential    2. Vitamin D deficiency    3. Encounter for prostate cancer screening    4. Hypotestosteronemia    5. Hypercholesterolemia    6. Elevated vitamin B12 level        Plan:     I have Reviewed and summarized old records.  I have performed thorough medication reconciliation today and discussed risk and benefits of each medication.  I have reviewed labs and discussed with patient.  All questions were answered.  I am requesting old records and will review them once they are available.    Problem List Items Addressed This Visit     Hypertension, essential - Primary (Chronic)     Signing patient for digital medicine hypertension program today.    Blood pressure medications were increased last visit.  Adding amlodipine at this visit.  Repeat blood pressure check in 2 weeks.    Discussed hypertension disease course.  Discussed-DASH-diet and exercise.  Discussed medication regimen importance of treating high blood pressure.  Patient understood and advised of risk of untreated blood pressure.  ER precautions were given for symptoms of hypertensive urgency and emergency.           Relevant Medications    amLODIPine (NORVASC) 5 MG tablet    Other Relevant Orders    [facility] Enroll[facility] (Completed)    Hypertension Digital Medicine ([facility]): Assign Onboarding Questionnaires (Completed)    Hypercholesterolemia     Restart lovastatin.  Recheck special lipid panel in 3 to 6 months.    Discussed hyperlipidemia disease course.  Discussed the risk of cardiovascular disease, increase stroke and heart attack risk.  Patient voiced understanding and understood  the treatment plan. All questions were answered.  Discussed healthy diet and increased need for exercise.         Relevant Medications    lovastatin (MEVACOR) 20 MG tablet    Encounter for prostate cancer screening    Relevant Orders    PSA, Screening    Vitamin D deficiency     Continue supplementation.  Recheck in 3 months.         Relevant Medications    ergocalciferol (ERGOCALCIFEROL) 50,000 unit Cap    Hypotestosteronemia     Associated with fatigue.  Chronic.  Starting testosterone 50 mg every 2 weeks.  Recheck level in 3 months.  Advised of risk and benefits of testosterone hormone replacement.         Relevant Medications    testosterone cypionate injection 50 mg    testosterone cypionate injection 50 mg (Completed)    Elevated vitamin B12 level     Decreased vitamin B12 supplementation of 1000 daily.  Recheck level in 3 months.               Follow up in about 2 weeks (around 8/29/2019), or if symptoms worsen or fail to improve, for 2 weeks for BP check.    If no improvement in symptoms or symptoms worsen, advised to call/follow-up at clinic or go to ER. Patient voiced understanding and all questions/concerns were addressed.     DISCLAIMER: This note was compiled by using a speech recognition dictation system and therefore please be aware that typographical / speech recognition errors can and do occur.  Please contact me if you see any errors specifically.    Randal Keenan MD  We Offer Telehealth & Same Day Appointments!   Book your Telehealth appointment with me through my nurse or   Clinic appointments on [employer]!    Office: [phone]          Check out my Facebook Page and Follow Me at: CLICK HERE    Check out my website at [employer] by clicking on: CLICK HERE    To Schedule appointments online, go to [employer]: CLICK HERE     Location: [email or web address]    [address] [place] [address]    FAX: [phone]

## 2019-08-18 NOTE — ASSESSMENT & PLAN NOTE
Associated with fatigue.  Chronic.  Starting testosterone 50 mg every 2 weeks.  Recheck level in 3 months.  Advised of risk and benefits of testosterone hormone replacement.

## 2019-08-18 NOTE — PATIENT INSTRUCTIONS
Testosterone injection  What is this medicine?  TESTOSTERONE (willie TOS ter one) is the main male hormone. It supports normal male development such as muscle growth, facial hair, and deep voice. It is used in males to treat low testosterone levels.  How should I use this medicine?  This medicine is for injection into a muscle. It is usually given by a health care professional in a hospital or clinic setting.  Contact your pediatrician regarding the use of this medicine in children. While this medicine may be prescribed for children as young as 12 years of age for selected conditions, precautions do apply.  What side effects may I notice from receiving this medicine?  Side effects that you should report to your doctor or health care professional as soon as possible:  · allergic reactions like skin rash, itching or hives, swelling of the face, lips, or tongue  · breast enlargement  · breathing problems  · changes in mood, especially anger, depression, or rage  · dark urine  · general ill feeling or flu-like symptoms  · light-colored stools  · loss of appetite, nausea  · nausea, vomiting  · right upper belly pain  · stomach pain  · swelling of ankles  · too frequent or persistent erections  · trouble passing urine or change in the amount of urine  · unusually weak or tired  · yellowing of the eyes or skin  Additional side effects that can occur in women include:  · deep or hoarse voice  · facial hair growth  · irregular menstrual periods  Side effects that usually do not require medical attention (report to your doctor or health care professional if they continue or are bothersome):  · acne  · change in sex drive or performance  · hair loss  · headache  What may interact with this medicine?  · medicines for diabetes  · medicines that treat or prevent blood clots like warfarin  · oxyphenbutazone  · propranolol  · steroid medicines like prednisone or cortisone  What if I miss a dose?  Try not to miss a dose. Your doctor  or health care professional will tell you when your next injection is due. Notify the office if you are unable to keep an appointment.  Where should I keep my medicine?  Keep out of the reach of children. This medicine can be abused. Keep your medicine in a safe place to protect it from theft. Do not share this medicine with anyone. Selling or giving away this medicine is dangerous and against the law.  Store at room temperature between 20 and 25 degrees C (68 and 77 degrees F). Do not freeze. Protect from light. Follow the directions for the product you are prescribed. Throw away any unused medicine after the expiration date.  What should I tell my health care provider before I take this medicine?  They need to know if you have any of these conditions:  · breast cancer  · diabetes  · heart disease  · kidney disease  · liver disease  · lung disease  · prostate cancer, enlargement  · an unusual or allergic reaction to testosterone, other medicines, foods, dyes, or preservatives  · pregnant or trying to get pregnant  · breast-feeding  What should I watch for while using this medicine?  Visit your doctor or health care professional for regular checks on your progress. They will need to check the level of testosterone in your blood.  This medicine is only approved for use in men who have low levels of testosterone related to certain medical conditions. Heart attacks and strokes have been reported with the use of this medicine. Notify your doctor or health care professional and seek emergency treatment if you develop breathing problems; changes in vision; confusion; chest pain or chest tightness; sudden arm pain; severe, sudden headache; trouble speaking or understanding; sudden numbness or weakness of the face, arm or leg; loss of balance or coordination. Talk to your doctor about the risks and benefits of this medicine.  This medicine may affect blood sugar levels. If you have diabetes, check with your doctor or health  care professional before you change your diet or the dose of your diabetic medicine.  This drug is banned from use in athletes by most athletic organizations.  NOTE:This sheet is a summary. It may not cover all possible information. If you have questions about this medicine, talk to your doctor, pharmacist, or health care provider. Copyright© 2017 [employer]

## 2019-08-18 NOTE — ASSESSMENT & PLAN NOTE
Signing patient for digital medicine hypertension program today.    Blood pressure medications were increased last visit.  Adding amlodipine at this visit.  Repeat blood pressure check in 2 weeks.    Discussed hypertension disease course.  Discussed-DASH-diet and exercise.  Discussed medication regimen importance of treating high blood pressure.  Patient understood and advised of risk of untreated blood pressure.  ER precautions were given for symptoms of hypertensive urgency and emergency.

## 2019-08-27 ENCOUNTER — PATIENT OUTREACH (OUTPATIENT)
Dept: OTHER | Facility: OTHER | Age: 34
End: 2019-08-27

## 2019-08-27 NOTE — PROGRESS NOTES
1st attempt for enrollment call.  No answer, left voicemail.    Last 5 Patient Entered Readings                                      Current 30 Day Average: 122/86     Recent Readings 8/27/2019 8/27/2019 8/27/2019 8/26/2019 8/26/2019    SBP (mmHg) 121 131 131 127 107    DBP (mmHg) 99 86 86 88 87    Pulse 105 108 108 82 82

## 2019-08-27 NOTE — LETTER
September 6, 2019     Karlos Clark  [address] [address]       Dear Karlos,    Welcome to [facility]! Our goal is to make care effective, proactive and convenient by using data you send us from home to better treat your chronic conditions.          My name is Rosa Rocha, and I am your dedicated Digital Medicine clinician. As an expert in medication management, I will help ensure that the medications you are taking continue to provide the intended benefits and help you reach your goals. You can reach me directly at [phone] or by sending me a message directly through your [facility] account.      I am Kelly Pacheco and I will be your health . My job is to help you identify lifestyle changes to improve your disease control. We will talk about nutrition, exercise, and other ways you may be able to adjust your current habits to better your health. Additionally, we will help ensure you are completing the tests and screenings that are necessary to help manage your conditions. You can reach me directly at  or by sending me a message directly through your [facility] account.    Most importantly, YOU are at the center of this team. Together, we will work to improve your overall health and encourage you to meet your goals for a healthier lifestyle.     What we expect from YOU:  · Please take frequent home blood pressure measurements. We ask that you take at least 1 blood pressure reading per week, but more information will better help us get you know you. Be sure you rest for a few minutes before taking the reading in a quiet, comfortable place.     Be available to receive phone calls or [facility] messages, when appropriate, from your care team. Please let us know if there are any specific days or times that work best for us to reach you via phone.     Complete routine tests and screenings. Dont worry, we will help keep you on track!           What you should expect  from your Digital Medicine Care Team:   We will work with you to create a personalized plan of care and provide you with encouragement and education, including regarding lifestyle changes, that could help you manage your disease states.     We will adjust your current medications, if needed, and continue to monitor your long-term progress.     We will provide you and your physician with monthly progress reports after you have been in the program for more than 30 days.     We will send you reminders through [facility] and text messages to help ensure you do not miss any testing deadlines to help manage your disease states.    You will be able to reach us by phone or through your [facility] account by clicking our names under Care Team on the right side of the home screen.    I look forward to working with you to achieve your blood pressure goals!    We look forward to working with you to help manage your health,    Sincerely,    Your Digital Medicine Team    Please visit our websites to learn more:   · Hypertension: [email or web address]      Remember, we are not available for emergencies. If you have an emergency, please contact your doctors office directly or call [facility] on[facility] ([phone] or [phone]) or [phone].

## 2019-08-27 NOTE — LETTER
September 6, 2019     Karlos Clark  [address] [address]       Dear Karlos,    Welcome to [facility]! Our goal is to make care effective, proactive and convenient by using data you send us from home to better treat your chronic conditions.          My name is Rosa Rocha, and I am your dedicated Digital Medicine clinician. As an expert in medication management, I will help ensure that the medications you are taking continue to provide the intended benefits and help you reach your goals. You can reach me directly at [phone] or by sending me a message directly through your [facility] account.      I am Kelly Pacheco and I will be your health . My job is to help you identify lifestyle changes to improve your disease control. We will talk about nutrition, exercise, and other ways you may be able to adjust your current habits to better your health. Additionally, we will help ensure you are completing the tests and screenings that are necessary to help manage your conditions. You can reach me directly at  or by sending me a message directly through your [facility] account.    Most importantly, YOU are at the center of this team. Together, we will work to improve your overall health and encourage you to meet your goals for a healthier lifestyle.     What we expect from YOU:  · Please take frequent home blood pressure measurements. We ask that you take at least 1 blood pressure reading per week, but more information will better help us get you know you. Be sure you rest for a few minutes before taking the reading in a quiet, comfortable place.     Be available to receive phone calls or [facility] messages, when appropriate, from your care team. Please let us know if there are any specific days or times that work best for us to reach you via phone.     Complete routine tests and screenings. Dont worry, we will help keep you on track!           What you should expect  from your Digital Medicine Care Team:   We will work with you to create a personalized plan of care and provide you with encouragement and education, including regarding lifestyle changes, that could help you manage your disease states.     We will adjust your current medications, if needed, and continue to monitor your long-term progress.     We will provide you and your physician with monthly progress reports after you have been in the program for more than 30 days.     We will send you reminders through [facility] and text messages to help ensure you do not miss any testing deadlines to help manage your disease states.    You will be able to reach us by phone or through your [facility] account by clicking our names under Care Team on the right side of the home screen.    I look forward to working with you to achieve your blood pressure goals!    We look forward to working with you to help manage your health,    Sincerely,    Your Digital Medicine Team    Please visit our websites to learn more:   · Hypertension: [email or web address]      Remember, we are not available for emergencies. If you have an emergency, please contact your doctors office directly or call [facility] on[facility]call ([phone] or [phone]) or [phone].

## 2019-08-30 ENCOUNTER — OFFICE VISIT (OUTPATIENT)
Dept: PSYCHIATRY | Facility: CLINIC | Age: 34
End: 2019-08-30
Payer: COMMERCIAL

## 2019-08-30 VITALS
HEART RATE: 86 BPM | DIASTOLIC BLOOD PRESSURE: 93 MMHG | WEIGHT: 308.63 LBS | HEIGHT: 70 IN | SYSTOLIC BLOOD PRESSURE: 122 MMHG | BODY MASS INDEX: 44.18 KG/M2

## 2019-08-30 DIAGNOSIS — F41.1 GENERALIZED ANXIETY DISORDER: ICD-10-CM

## 2019-08-30 DIAGNOSIS — F33.0 MILD EPISODE OF RECURRENT MAJOR DEPRESSIVE DISORDER: Primary | ICD-10-CM

## 2019-08-30 PROCEDURE — 99999 PR PBB SHADOW E&M-EST. PATIENT-LVL III: CPT | Mod: PBBFAC,,, | Performed by: PSYCHOLOGIST

## 2019-08-30 PROCEDURE — 90792 PSYCH DIAG EVAL W/MED SRVCS: CPT | Mod: S$GLB,,, | Performed by: PSYCHOLOGIST

## 2019-08-30 PROCEDURE — 90792 PR PSYCHIATRIC DIAGNOSTIC EVALUATION W/MEDICAL SERVICES: ICD-10-PCS | Mod: S$GLB,,, | Performed by: PSYCHOLOGIST

## 2019-08-30 PROCEDURE — 99999 PR PBB SHADOW E&M-EST. PATIENT-LVL III: ICD-10-PCS | Mod: PBBFAC,,, | Performed by: PSYCHOLOGIST

## 2019-08-30 RX ORDER — SERTRALINE HYDROCHLORIDE 100 MG/1
100 TABLET, FILM COATED ORAL DAILY
Qty: 30 TABLET | Refills: 0 | Status: SHIPPED | OUTPATIENT
Start: 2019-08-30 | End: 2019-09-19 | Stop reason: SDUPTHER

## 2019-08-30 NOTE — LETTER
August 30, 2019        Randal Keenan MD  [address]  [place] [address]             [facility]  [address]  [place] [address]  Phone: [phone]   Patient: Karlos Clark   MR Number: [id number]   YOB: 1985   Date of Visit: 8/30/2019       Dear Dr. Keenan:    Thank you for referring Karlos Clark to me for evaluation. Below are the relevant portions of my assessment and plan of care.    Pt is to increase Zoloft dosage to 100mg Q HS, start psychotherapy, and was encouraged to start an exercise regimen and improve his diet. Will adjust SSRI/SNRI treatment until good remission is achieved.     If you have questions, please do not hesitate to call me. I look forward to following Karlos along with you.    Sincerely,      Anthony Lundberg, PhD, MP           CC  No Recipients

## 2019-08-30 NOTE — PROGRESS NOTES
Outpatient Psychiatry Initial Visit  08/30/2019    ID:   Patient presents for an initial evaluation.     Reason for encounter: Self-referred.     Chief Complaint: mild depression; generalized anxiety    History of Presenting Illness:  Pt described early signs and symptoms of anxiety as a young child. Pt said that he remembers obsessive compulsive traits as a kid (eg having his toys arranged in a certain way) and worrying a lot (eg worrying that his parents would die). Pt described poor self-esteem development and credited this to his weight and also struggling with acne. Pt said that he had friends but not a best friend. Pt described his homelife as being somewhat stressful as his father had a temper and it was like walking on eggshells with him.     Pt started work at 20 years old at the [employer] office and said that, although he liked earning money, he became dissatisfied with his life seeing his other friends in college. Pt said that he was binge drinking in his 20's, as well. 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 Pt has never had a relationship and said that he is disinterested in having one. Pt described chronic anxiety and mild depression.     Depression symptoms: irritable; fatigue; overeating; isolation; general dysthymia and feeling overwhelmed     Anxiety symptoms: Pt reported lifelong chronic worry. Pt described excessive, unproductive worry that is difficult to control. Pt explained that worrying about things outside of locust of control and worst case scarnios and and described this worry as ruminative consistent with generalized anxiety disorder. Pt denied symptoms consistent with OCD, panic, phobias, or social anxiety.     Rosetta/Hypomania Symptoms: Pt denied current or history of related symptoms.    Psychosis Symptoms: Pt denied current or history of related symptoms.    Attention/Concentration Symptoms: Pt said that he feels he  has mild, manageable attention/concentration difficulties.     Disordered Eating/Body Image Concerns: Pt denied current or history of related symptoms.    Suicidal Ideation and Risk: Pt denied current or history of related symptoms.    Homicidal/Violent Ideation and Risk: Pt denied current or history of related symptoms.    Criminal History: Pt denied.    Prior Psychiatric Treatment/Hospitalizations: Pt denied.     Current psychiatric medication: Zoloft 50mg Q HS    Prior psychiatric medication trials: Effexor; Lexapro (increased bp); celexa (tachyphylaxis)     Current Medical Conditions Per Chart Review:   Patient Active Problem List   Diagnosis    Obesity    Hypertension, essential    Depression    Hypercholesterolemia    Chronic seasonal allergic rhinitis    Chronic sphenoidal sinusitis    Hypertrophy of inferior nasal turbinate    Annual physical exam    Anxiety    Benign essential hypertension    Body mass index (BMI) greater than 25    Chronic frontal sinusitis    Chronic pain of right knee    Deviated nasal septum    Flat foot    Gastroesophageal reflux disease    Nasal congestion    Nasal obstruction    Snoring    Thyroid disorder screen    Unrefreshed by sleep    Encounter to establish care    Encounter for long-term (current) use of medications    Other fatigue    Anxiety and depression    Cold intolerance of hand    Encounter for prostate cancer screening    Vitamin D deficiency    Hypotestosteronemia    Elevated vitamin B12 level      Family Psychiatric History:  paternal aunt - depression; dad - anger problems and alcohol abuse    Alcohol Use: Pt reported minimal, infrequent alcohol use and denied a history of problematic drinking.    Tobacco and Drug Use: Pt denied tobacco or drug use.     Social History:  Pt was raised in [place] by  mother and father who fought a lot and a sister 7 years younger than her. 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  Pt's parents recently moved in with him until they can find another home. Pt described being distant from his friends and not having a romantic relationship or children. Pt does not exercise and reported eating unhealthy. Pt drinks occasionally and minimally, per his report, and does not smoke cigarettes or use drugs.      Trauma history:  pt denied     Mental Status Exam      Physical Exam   Psychiatric: His speech is normal and behavior is normal. Judgment and thought content normal. His mood appears anxious. He is not actively hallucinating. Thought content is not paranoid and not delusional. Cognition and memory are normal. He exhibits a depressed mood. He expresses no homicidal and no suicidal ideation. He expresses no suicidal plans and no homicidal plans.   General appearance:  casually groomed, casually dressed    Behavior:  calm, engaged    Demeanor:  pleasant, cooperative    Mood:  anxious, depressed    Affect:  nervous, sad  Speech:  regular rate, tone and volume    Thought Process:  linear and goal directed    Thought Content:  appropriate - absent of aggressive or self injurious thoughts, feelings or impulses    Insight into Current Situation:  fair    Judgement: fair   Expected Ability to Adhere to Treatment plan: good        Current Evaluation:  Vitals: most recent vitals signs, dated greater than 90 days prior to this appointment, were reviewed  General: age appropriate, well nourished, casually dressed, neatly groomed  MSK: muscle strength/tone : no tremor or abnormal movements. Gait/Station: no ataxic, steady    Clinical Assessment :     Pt appears to struggle with persistent low mood and generalized anxiety that has partially responded to Zoloft treatment.      Diagnosis(es):   1) Major Depressive Disorder, recurrent, mild  2) Generalized Anxiety Disorder    Plan      Goal #1: Improve mood    Pt is to increase Zoloft dosage to 100mg Q HS, start psychotherapy, and was encouraged to start an  exercise regimen and improve his diet.     Treatment plan and medication changes will be coordinated with PCP, Dr. Vidales.     This author reviewed limits to confidentiality and this author's collaboration with pt's physician. Pt indicated understanding and denied any questions.    Return to Clinic: 3 weeks  Counseling time: 15 mins  Total time: 50 mins    -Call to report any worsening of symptoms or problems associated with medication  - Pt instructed to go to ER if thoughts of harming self or others arise     -Spent 50min face to face with the pt; >50% time spent in counseling   -Supportive therapy and psychoeducation provided  -R/B/SE's of medications discussed with the pt who expresses understanding and chooses to take medications as prescribed.   -Pt instructed to call clinic, 911 or go to nearest emergency room if sxs worsen or pt is in   crisis. The pt expresses understanding.    Antidepressant/Antianxiety Medication Initiation:  Patient informed of risks, benefits, and potential side effects of medication and accepts informed consent.  Common side effects include nausea, fatigue, headache, insomnia., Specifically discussed the possibility of new or worsening suicidal thoughts/depression.  Patient instructed to stop the medication immediately and seek urgent treatment if this occurs., Patient instructed not to abruptly discontinue medication without physician guidance except in cases of sudden onset or worsening of SI.

## 2019-09-06 NOTE — PROGRESS NOTES
2nd attempt for enrollment call.  No answer, left voicemail.  Sent my portal message.      Last 5 Patient Entered Readings                                      Current 30 Day Average: 125/90     Recent Readings 9/6/2019 9/6/2019 9/5/2019 9/5/2019 9/4/2019    SBP (mmHg) 135 139 123 137 127    DBP (mmHg) 101 91 82 99 88    Pulse 98 100 113 115 87

## 2019-09-06 NOTE — PROGRESS NOTES
Digital Medicine Enrollment Call    Introduced Mr. Karlos Clark to Digital Medicine.     Discussed program expectations and requirements.    Introduced digital medicine care team.     Reviewed the importance of self-monitoring for digital medicine participation.     Reviewed that the Digital Medicine team is not available for emergencies and instructed the patient to call 911 or [facility] ([phone] or [phone]) if one arises.        Last 5 Patient Entered Readings                                      Current 30 Day Average: 125/90     Recent Readings 9/6/2019 9/6/2019 9/5/2019 9/5/2019 9/4/2019    SBP (mmHg) 135 139 123 137 127    DBP (mmHg) 101 91 82 99 88    Pulse 98 100 113 115 87

## 2019-09-09 ENCOUNTER — PATIENT OUTREACH (OUTPATIENT)
Dept: OTHER | Facility: OTHER | Age: 34
End: 2019-09-09

## 2019-09-16 NOTE — PROGRESS NOTES
Digital Medicine: Health  Introduction    Introduced Karlos Clark to Digital Medicine. Discussed health  role and recommended lifestyle modifications.        HYPERTENSION    Patient's BP goal is 130/80.Patient's BP average is 125/90 mmHg, which is above goal, per 2017 ACC/AHA Hypertension Guidelines.          Last 5 Patient Entered Readings                                      Current 30 Day Average: 125/90     Recent Readings 9/16/2019 9/13/2019 9/13/2019 9/12/2019 9/11/2019    SBP (mmHg) 124 124 139 140 128    DBP (mmHg) 91 79 93 92 93    Pulse 91 88 84 98 83                Diet:       Barriers to a Healthy Diet: time/convenience    Physical Activity:   When asked if exercising, patient responded: yes    He exercises for 45 minutes per day 3 day(s) a week.      Patient participates in the following activities: walking    States that he is working on increasing physical activity and is considering joining a gym. He currently walks on the treadmill for 30-60 minutes 3 times a week.    Medication Adherence:   He misses doses: never        SDOH    Intervention/Plan    There are no preventive care reminders to display for this patient.    Reviewed the importance of self-monitoring, medication adherence, and that the health  can be used as a resource for lifestyle modifications to help reduce or maintain a healthy lifestyle.    Sent link to [facility] webpages and my contact information via [employer] for future questions. Follow up scheduled.

## 2019-09-19 ENCOUNTER — OFFICE VISIT (OUTPATIENT)
Dept: PSYCHIATRY | Facility: CLINIC | Age: 34
End: 2019-09-19
Payer: COMMERCIAL

## 2019-09-19 VITALS
DIASTOLIC BLOOD PRESSURE: 90 MMHG | HEART RATE: 80 BPM | BODY MASS INDEX: 44.61 KG/M2 | SYSTOLIC BLOOD PRESSURE: 129 MMHG | WEIGHT: 311.63 LBS | HEIGHT: 70 IN

## 2019-09-19 DIAGNOSIS — F41.1 GENERALIZED ANXIETY DISORDER: ICD-10-CM

## 2019-09-19 DIAGNOSIS — F33.0 MILD EPISODE OF RECURRENT MAJOR DEPRESSIVE DISORDER: Primary | ICD-10-CM

## 2019-09-19 PROCEDURE — 99999 PR PBB SHADOW E&M-EST. PATIENT-LVL III: CPT | Mod: PBBFAC,,, | Performed by: PSYCHOLOGIST

## 2019-09-19 PROCEDURE — 99213 OFFICE O/P EST LOW 20 MIN: CPT | Mod: S$GLB,,, | Performed by: PSYCHOLOGIST

## 2019-09-19 PROCEDURE — 99999 PR PBB SHADOW E&M-EST. PATIENT-LVL III: ICD-10-PCS | Mod: PBBFAC,,, | Performed by: PSYCHOLOGIST

## 2019-09-19 PROCEDURE — 3080F DIAST BP >= 90 MM HG: CPT | Mod: CPTII,S$GLB,, | Performed by: PSYCHOLOGIST

## 2019-09-19 PROCEDURE — 90833 PR PSYCHOTHERAPY W/PATIENT W/E&M, 30 MIN (ADD ON): ICD-10-PCS | Mod: ,,, | Performed by: PSYCHOLOGIST

## 2019-09-19 PROCEDURE — 3008F PR BODY MASS INDEX (BMI) DOCUMENTED: ICD-10-PCS | Mod: CPTII,S$GLB,, | Performed by: PSYCHOLOGIST

## 2019-09-19 PROCEDURE — 3074F PR MOST RECENT SYSTOLIC BLOOD PRESSURE < 130 MM HG: ICD-10-PCS | Mod: CPTII,S$GLB,, | Performed by: PSYCHOLOGIST

## 2019-09-19 PROCEDURE — 90833 PSYTX W PT W E/M 30 MIN: CPT | Mod: ,,, | Performed by: PSYCHOLOGIST

## 2019-09-19 PROCEDURE — 3080F PR MOST RECENT DIASTOLIC BLOOD PRESSURE >= 90 MM HG: ICD-10-PCS | Mod: CPTII,S$GLB,, | Performed by: PSYCHOLOGIST

## 2019-09-19 PROCEDURE — 3008F BODY MASS INDEX DOCD: CPT | Mod: CPTII,S$GLB,, | Performed by: PSYCHOLOGIST

## 2019-09-19 PROCEDURE — 99213 PR OFFICE/OUTPT VISIT, EST, LEVL III, 20-29 MIN: ICD-10-PCS | Mod: S$GLB,,, | Performed by: PSYCHOLOGIST

## 2019-09-19 PROCEDURE — 3074F SYST BP LT 130 MM HG: CPT | Mod: CPTII,S$GLB,, | Performed by: PSYCHOLOGIST

## 2019-09-19 RX ORDER — SERTRALINE HYDROCHLORIDE 100 MG/1
100 TABLET, FILM COATED ORAL DAILY
Qty: 30 TABLET | Refills: 2 | Status: SHIPPED | OUTPATIENT
Start: 2019-09-19 | End: 2020-01-14 | Stop reason: SDUPTHER

## 2019-09-19 NOTE — PROGRESS NOTES
Outpatient Psychiatry Follow-Up Visit    Clinical Status of Patient: Outpatient (Ambulatory)  09/19/2019     Chief Complaint: 34 year old male presenting today for a follow-up.       Interval History and Content of Current Session:  Interim Events/Subjective Report/Content of Current Session:  follow-up appointment.    Pt is a 34 year old male with past psychiatric hx of depression and anxiety who presents for follow-up treatment. Pt reported improved mood since increasing the dose of Zoloft. Pt said that he is happier in general, sleeping better, less irritable, and experiencing less fatigue. Pt denied any side effects or other relevant changes.     Past Psychiatric hx: Effexor; Lexapro (increased bp); celexa (tachyphylaxis) in the past    Past Medical hx:   Past Medical History:   Diagnosis Date    Depression 12/7/2011    GERD (gastroesophageal reflux disease)     Hypertension 12/7/2011    Obesity 12/7/2011        Interim hx:  Medication changes last visit:   Increased Zoloft to 100mg Q HS  Anxiety: mild - improved  Depression: mild - improved     Denies suicidal/homicidal ideations.  Denies hopelessness/worthlessness.    Denies auditory/visual hallucinations      Alcohol: minimal use  Drug: pt denied  Caffeine: minimal use  Tobacco: pt denied      Review of Systems   · PSYCHIATRIC: Pertinent items are noted in the narrative.        CONSTITUTIONAL: weight stable     Past Medical, Family and Social History: The patient's past medical, family and social history have been reviewed and updated as appropriate within the electronic medical record. See encounter notes.     Current Psychiatric Medication:  Zoloft 100mg Q HS     Compliance: yes      Side effects: pt denied     Risk Parameters:  Patient reports no suicidal ideation  Patient reports no homicidal ideation  Patient reports no self-injurious behavior  Patient reports no violent behavior     Exam (detailed: at least 9 elements; comprehensive: all 15  elements)   Constitutional  Vitals:  Most recent vital signs, dated less than 90 days prior to this appointment, were reviewed. Pulse:  [80]   BP: (129)/(90)       General:  unremarkable, age appropriate, casual attire, good eye contact, good rapport       Musculoskeletal  Muscle Strength/Tone:  no flaccidity, no tremor    Gait & Station:  normal      Psychiatric                       Speech:  normal tone, normal rate, rhythm, and volume   Mood & Affect:   Depressed, anxious         Thought Process:   Goal directed; Linear    Associations:   intact   Thought Content:   No SI/HI, delusions, or paranoia, no AV/VH   Insight & Judgement:   Good, adequate to circumstances   Orientation:   grossly intact; alert and oriented x 4    Memory:  intact for content of interview    Language:  grossly intact, can repeat    Attention Span  : Grossly intact for content of interview   Fund of Knowledge:   intact and appropriate to age and level of education        Assessment and Diagnosis   Status/Progress: improving     Impression: Pt appears to struggle with persistent low mood and generalized anxiety that has partially responded to Zoloft treatment.     Diagnosis: 1) Major Depressive Disorder, recurrent, mild    2) Generalized Anxiety Disorder    Intervention/Counseling/Treatment Plan   · Medication Management:      1. Continue Zoloft 100mg Q HS     2. Encouraged to improve diet and start an exercise regimen     3. Therapy referral deferred at this time     4. Call to report any worsening of symptoms or problems with the medication. Pt instructed to go to ER with thoughts of harming self, others    Psychotherapy:   · Target symptoms: depression; anxiety  · Why chosen therapy is appropriate versus another modality: CBT used; relevant to diagnosis, patient responds to this modality  · Outcome monitoring methods: self-report, observation  · Therapeutic intervention type: Cognitive Behavioral Therapy  · Topics discussed/themes:  building skills sets for symptom management, symptom recognition, nutrition, exercise  · The patient's response to the intervention is   · Patient's response to treatment is: good.   · The patient's progress toward treatment goals: improving  · Duration of intervention: 20 minutes     Return to clinic: 2 months    -Spent 20min face to face with the pt; >50% time spent in counseling   -Cognitive-Behavioral/Supportive therapy and psychoeducation provided  -R/B/SE's of medications discussed with the pt who expresses understanding and chooses to take medications as prescribed.   -Pt instructed to call clinic, 911 or go to nearest emergency room if sxs worsen or pt is in   crisis. The pt expresses understanding.    Anthony Lundberg, PhD, MP

## 2019-09-23 DIAGNOSIS — Z76.89 ENCOUNTER TO ESTABLISH CARE: ICD-10-CM

## 2019-09-23 DIAGNOSIS — I10 HYPERTENSION, ESSENTIAL: ICD-10-CM

## 2019-09-23 DIAGNOSIS — Z79.899 ENCOUNTER FOR LONG-TERM (CURRENT) USE OF MEDICATIONS: ICD-10-CM

## 2019-09-23 RX ORDER — BENAZEPRIL/HYDROCHLOROTHIAZIDE 20 MG-25MG
1 TABLET ORAL DAILY
Qty: 90 TABLET | Refills: 3 | Status: SHIPPED | OUTPATIENT
Start: 2019-09-23 | End: 2020-01-30 | Stop reason: SDUPTHER

## 2019-09-26 ENCOUNTER — OFFICE VISIT (OUTPATIENT)
Dept: PSYCHIATRY | Facility: CLINIC | Age: 34
End: 2019-09-26
Payer: COMMERCIAL

## 2019-09-26 ENCOUNTER — PATIENT OUTREACH (OUTPATIENT)
Dept: ADMINISTRATIVE | Facility: HOSPITAL | Age: 34
End: 2019-09-26

## 2019-09-26 DIAGNOSIS — F41.1 GENERALIZED ANXIETY DISORDER: ICD-10-CM

## 2019-09-26 DIAGNOSIS — F41.9 ANXIETY AND DEPRESSION: Primary | ICD-10-CM

## 2019-09-26 DIAGNOSIS — F32.A ANXIETY AND DEPRESSION: Primary | ICD-10-CM

## 2019-09-26 PROCEDURE — 99999 PR PBB SHADOW E&M-EST. PATIENT-LVL II: CPT | Mod: PBBFAC,,, | Performed by: SOCIAL WORKER

## 2019-09-26 PROCEDURE — 99999 PR PBB SHADOW E&M-EST. PATIENT-LVL II: ICD-10-PCS | Mod: PBBFAC,,, | Performed by: SOCIAL WORKER

## 2019-09-26 PROCEDURE — 90791 PSYCH DIAGNOSTIC EVALUATION: CPT | Mod: S$GLB,,, | Performed by: SOCIAL WORKER

## 2019-09-26 PROCEDURE — 90791 PR PSYCHIATRIC DIAGNOSTIC EVALUATION: ICD-10-PCS | Mod: S$GLB,,, | Performed by: SOCIAL WORKER

## 2019-09-26 NOTE — PROGRESS NOTES
"Psychiatry Initial Visit (PhD/LCSW)  Diagnostic Interview - CPT 17738    Date: 9/26/2019    Site: [facility] - PSYCHIATRY  [facility] [place] [facility]    Referral source: Anthony Lundberg MP    Clinical status of patient: Outpatient    Karlos Clark, a 34 y.o. male, for initial evaluation visit.  Met with patient.    Chief complaint/reason for encounter: anxiety    History of present illness: Reviewed chart. Since age 20 or 21, Celexa for anx and depre. Since tx, depression & sadness reduced. Continues general anx. Stress re deadlines, hard to relax, espec at bedtime. Quitting [employer] today, going to work [employer], where he's worked PT for 9 yrs. FT job opens Jan, but can start PT now. Parents live with him while house hunting, mostly [place]. Hobby, painting furniture. Worked 6 days/wk for 1 1/2 yrs, little time to relax. Job very stressful, lots stupid things he had to do. Imagines some relief with job change, some is just his nature. Would like to date, answered question "Do you prefer to date men or women?" with "I don't care." Describes self as clueless as to when someone is flirting with him. Stays in [place] with friends, shows little interest outside of it, no contact with potentially datable people.    Per Dr. Lundberg: Chief Complaint: mild depression; generalized anxiety     History of Presenting Illness:  Pt described early signs and symptoms of anxiety as a young child. Pt said that he remembers obsessive compulsive traits as a kid (eg having his toys arranged in a certain way) and worrying a lot (eg worrying that his parents would die). Pt described poor self-esteem development and credited this to his weight and also struggling with acne. Pt said that he had friends but not a best friend. Pt described his homelife as being somewhat stressful as his father had a temper and it was like walking on eggshells with him.      Pt started work at 20 years old at the [employer] office and said that, although he " liked earning money, he became dissatisfied with his life seeing his other friends in college. Pt said that he was binge drinking in his 20's, as well. 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 Pt has never had a relationship and said that he is disinterested in having one. Pt described chronic anxiety and mild depression.     Pain: noncontributory    Symptoms:   · Mood: depressed mood, diminished interest, weight gain, fatigue and worthlessness/guilt  · Anxiety: excessive anxiety/worry, restlessness/keyed up and irritability  · Substance abuse: denied  · Cognitive functioning: denied  · Health behaviors: noncontributory    Psychiatric history: psychotropic management by PCP, has participated in counseling/psychotherapy on an outpatient basis in the past and currently under psychiatric care     Medical history:   Past Medical History:   Diagnosis Date    Depression 12/7/2011    GERD (gastroesophageal reflux disease)     Hypertension 12/7/2011    Obesity 12/7/2011       Family history of psychiatric illness:   Family History   Problem Relation Age of Onset    Hypertension Mother     COPD Father     Hypertension Father     No Known Problems Sister     Allergic rhinitis Neg Hx     Allergies Neg Hx     Angioedema Neg Hx     Asthma Neg Hx     Atopy Neg Hx     Eczema Neg Hx     Immunodeficiency Neg Hx     Rhinitis Neg Hx     Urticaria Neg Hx        Social history (marriage, employment, etc.):   Social History     Tobacco Use    Smoking status: Never Smoker    Smokeless tobacco: Never Used   Substance Use Topics    Alcohol use: Yes     Alcohol/week: 1.0 standard drinks     Types: 1 Shots of liquor per week     Frequency: Monthly or less     Drinks per session: 1 or 2     Binge frequency: Less than monthly     Comment: 2 drinks per month    Drug use: No       Current medications and drug reactions (include OTC, herbal): see  medication list     Strengths and liabilities: Strength: Patient accepts guidance/feedback, Strength: Patient is expressive/articulate., Strength: Patient is intelligent., Strength: Patient is motivated for change., Strength: Patient has positive support network., Strength: Patient has reasonable judgment.    Current Evaluation:     Mental Status Exam:  General Appearance:  unremarkable, age appropriate, well dressed, neatly groomed, obese   Speech: normal tone, normal rate, normal pitch, normal volume      Level of Cooperation: cooperative      Thought Processes: normal and logical   Mood: anxious      Thought Content: normal, no suicidality, no homicidality, delusions, or paranoia   Affect: congruent and appropriate   Orientation: Oriented x3   Memory: recent >  intact   Attention Span & Concentration: intact   Fund of General Knowledge: intact and appropriate to age and level of education   Abstract Reasoning: interpretation of similarities was abstract   Judgment & Insight: good     Language  intact     Diagnostic Impression - Plan:     No diagnosis found.    Plan:individual psychotherapy and consult psychiatrist for medication evaluation    Return to Clinic: 1 week    Length of Service (minutes): 45

## 2019-10-07 ENCOUNTER — PATIENT OUTREACH (OUTPATIENT)
Dept: OTHER | Facility: OTHER | Age: 34
End: 2019-10-07

## 2019-10-10 RX ORDER — FAMOTIDINE 20 MG/1
TABLET, FILM COATED ORAL
COMMUNITY
Start: 2019-09-23 | End: 2021-08-05

## 2019-10-10 RX ORDER — PANTOPRAZOLE SODIUM 40 MG/1
TABLET, DELAYED RELEASE ORAL
COMMUNITY
Start: 2019-09-21 | End: 2019-10-31

## 2019-10-28 ENCOUNTER — TELEPHONE (OUTPATIENT)
Dept: FAMILY MEDICINE | Facility: CLINIC | Age: 34
End: 2019-10-28

## 2019-10-28 PROBLEM — Z13.29 THYROID DISORDER SCREEN: Status: RESOLVED | Noted: 2017-06-30 | Resolved: 2019-10-28

## 2019-10-28 PROBLEM — Z00.00 ANNUAL PHYSICAL EXAM: Status: RESOLVED | Noted: 2017-06-30 | Resolved: 2019-10-28

## 2019-10-28 NOTE — TELEPHONE ENCOUNTER
Per order from Dr. Keenan- Patient's scheduled in office visit dated 10/31/2019 at 7 am needs to be changed to a 7 am tele health visit at the same date and same time, Called and left a voicemail for the patient notifying him of this and to call clinic with any questions or concerns.

## 2019-10-29 ENCOUNTER — PATIENT MESSAGE (OUTPATIENT)
Dept: FAMILY MEDICINE | Facility: CLINIC | Age: 34
End: 2019-10-29

## 2019-10-30 NOTE — PROGRESS NOTES
PLAN:      Problem List Items Addressed This Visit     Hypertension, essential - Primary (Chronic)     Increase amlodipine to 10 mg.  Continue digital medicine hypertension program.  Checking labs.    Discussed hypertension disease course.  Discussed-DASH-diet and exercise.  Discussed medication regimen importance of treating high blood pressure.  Patient understood and advised of risk of untreated blood pressure.  ER precautions were given for symptoms of hypertensive urgency and emergency.           Relevant Medications    amLODIPine (NORVASC) 10 MG tablet    Other Relevant Orders    Hepatic function panel    Basic metabolic panel    Hypercholesterolemia (Chronic)     Due for fasting lipid panel.Discussed hyperlipidemia disease course.  Discussed the risk of cardiovascular disease, increase stroke and heart attack risk.  Patient voiced understanding and understood the treatment plan. All questions were answered.  Discussed healthy diet and increased need for exercise.           Relevant Orders    Hepatic function panel    Lipid panel    Encounter for long-term (current) use of medications (Chronic)     Reviewed labs.  Updating labs on medication regimen.  Refills as prescribed. Complete history and physical was completed today.  Complete and thorough medication reconciliation was performed.  Discussed risks and benefits of medications.  Advised patient on orders and health maintenance.  We discussed old records and old labs if available.  Will request any records not available through epic.  Continue current medications listed on your summary sheet.           Relevant Medications    amLODIPine (NORVASC) 10 MG tablet    azelastine (ASTELIN) 137 mcg (0.1 %) nasal spray    Other Relevant Orders    Vitamin D    CBC auto differential    Testosterone    Hepatic function panel    Lipid panel    Vitamin D deficiency (Chronic)     Check vitamin-D level.  Continue supplementation.         Relevant Orders    Vitamin D     Hypotestosteronemia (Chronic)     Check testosterone and hemoglobin.  Associated with fatigue.  Chronic.  Starting testosterone 50 mg every 2 weeks.  Recheck level in 3 months.  Advised of risk and benefits of testosterone hormone replacement.         Relevant Medications    testosterone cypionate injection 50 mg (Completed)    testosterone cypionate injection 50 mg (Start on 11/1/2019  9:00 AM)    Other Relevant Orders    CBC auto differential    Testosterone    Hepatic function panel    Globus sensation     Referral to ENT.  ER precautions if having throat swelling. Continue Astelin and Zyrtec.         Relevant Orders    Ambulatory referral to ENT        Future Appointments     Date Provider Specialty Appt Notes    11/14/2019  Family Medicine testosterone 50 mg every 2 weeks    11/19/2019 Anthony Lundberg, PhD, MP Psychiatry follow up    1/30/2020 Randal Keenan MD Family Medicine 3 mo f/u htn           Medication List with Changes/Refills   New Medications    AMLODIPINE (NORVASC) 10 MG TABLET    Take 1 tablet (10 mg total) by mouth once daily.   Current Medications    BENAZEPRIL-HYDROCHLORTHIAZIDE (LOTENSIN HCT) 20-25 MG TAB    Take 1 tablet by mouth once daily.    CETIRIZINE (ZYRTEC) 10 MG TABLET    Take 10 mg by mouth once daily.    ERGOCALCIFEROL (ERGOCALCIFEROL) 50,000 UNIT CAP    Take 1 capsule (50,000 Units total) by mouth every 7 days.    FAMOTIDINE (PEPCID) 20 MG TABLET        FLUTICASONE PROPIONATE (FLONASE ALLERGY RELIEF) 50 MCG/ACTUATION NASAL SPRAY        LOVASTATIN (MEVACOR) 20 MG TABLET    Take 1 tablet (20 mg total) by mouth every evening.    SERTRALINE (ZOLOFT) 100 MG TABLET    Take 1 tablet (100 mg total) by mouth once daily.   Changed and/or Refilled Medications    Modified Medication Previous Medication    AZELASTINE (ASTELIN) 137 MCG (0.1 %) NASAL SPRAY azelastine (ASTELIN) 137 mcg (0.1 %) nasal spray       INSTILL 1 SPRAY IN EACH NOSTRIL TWO TIMES A DAY AS DIRECTED    INSTILL 1 SPRAY IN  EACH NOSTRIL TWO TIMES A DAY AS DIRECTED   Discontinued Medications    AMLODIPINE (NORVASC) 5 MG TABLET    Take 1 tablet (5 mg total) by mouth once daily.    PANTOPRAZOLE (PROTONIX) 40 MG TABLET           Randal Keenan M.D.     ==========================================================================  Subjective:      Patient ID: Karlos Clark is a 34 y.o. male.  has a past medical history of Depression (12/7/2011), Encounter for long-term (current) use of medications (7/23/2019), GERD (gastroesophageal reflux disease), Hypercholesterolemia (2/20/2013), Hypertension (12/7/2011), Hypotestosteronemia (8/15/2019), Obesity (12/7/2011), and Vitamin D deficiency (8/15/2019).     Chief Complaint: Hypertension (follow up); Low Testosterone; and Vitamin D Deficiency      Problem List Items Addressed This Visit     Hypertension, essential - Primary (Chronic)    Overview     Initial HPI:  Chronic.  Uncontrolled.  Patient on benazepril hydrochlorothiazide combination.  Reports compliance.  No side effects reported.  Denies any chest pain shortness of breath blurred vision.   BP Readings from Last 3 Encounters:   07/23/19 (!) 138/90   09/05/18 (!) 140/88   07/23/18 (!) 138/99    ======================================================  Update August 15, 2019.  Blood pressure still uncontrolled.  Patient reports compliance with medications.  Patient reports increased stress after getting of work today.  =======================================================  October 2019:  Chronic.  Borderline uncontrolled.  Patient on digital medicine hypertension program.  Diastolic is starting to increase.  Patient reports compliance with benazepril 20 mg and hydrochlorothiazide 25 mg.  Amlodipine 5 mg.         Current Assessment & Plan     Increase amlodipine to 10 mg.  Continue digital medicine hypertension program.  Checking labs.    Discussed hypertension disease course.  Discussed-DASH-diet and exercise.  Discussed medication  regimen importance of treating high blood pressure.  Patient understood and advised of risk of untreated blood pressure.  ER precautions were given for symptoms of hypertensive urgency and emergency.           Hypercholesterolemia (Chronic)    Overview     Initial HPI:  Chronic.  Uncontrolled but started on treatment..  Patient not on medications currently.  Patient has been on lovastatin in the past.  Not adherent to diet.  =======================================================  October 2019:  ======================================================  This condition is chronic.  Currently stable.  Patient reports compliance with hyperlipidemia treatment as prescribed.  No side effects reported at this time.The patient denies any myalgias, chest pain, shortness of breath, abdominal pain, nausea, vomiting, constipation, diarrhea, jaundice, skin changes at this time.    Lab Results   Component Value Date    CHOL 213 (H) 07/24/2019    CHOL 162 09/06/2018    CHOL 181 04/20/2018     Lab Results   Component Value Date    HDL 43 07/24/2019    HDL 33 (L) 09/06/2018    HDL 37 (L) 04/20/2018     Lab Results   Component Value Date    LDLCALC 139 (H) 07/24/2019    LDLCALC 88.6 09/06/2018    LDLCALC 105.4 04/20/2018     Lab Results   Component Value Date    TRIG 156 (H) 07/24/2019    TRIG 202 (H) 09/06/2018    TRIG 193 (H) 04/20/2018     Lab Results   Component Value Date    CHOLHDL 20.4 09/06/2018    CHOLHDL 20.4 04/20/2018    CHOLHDL 15.3 (L) 06/28/2011     Lab Results   Component Value Date    TOTALCHOLEST 4.9 09/06/2018    TOTALCHOLEST 4.9 04/20/2018    TOTALCHOLEST 6.6 (H) 06/28/2011     Lab Results   Component Value Date    ALT 18 07/24/2019    AST 17 07/24/2019    ALKPHOS 74 07/24/2019    BILITOT 0.5 07/24/2019     ======================================================            Current Assessment & Plan     Due for fasting lipid panel.Discussed hyperlipidemia disease course.  Discussed the risk of cardiovascular disease,  increase stroke and heart attack risk.  Patient voiced understanding and understood the treatment plan. All questions were answered.  Discussed healthy diet and increased need for exercise.           Encounter for long-term (current) use of medications (Chronic)    Overview     07/23/2019   Patient is on CHRONIC long-term drug therapy for managed conditions. See medication list. Reports compliance.  No side effects reported.  Routine lab work is being monitored.  Patient does  need refills today.   =======================================================  October 2019:Patient is on CHRONIC long-term drug therapy for managed conditions. See medication list. Reports compliance.  No side effects reported.  Routine lab work is being monitored.  Patient does need refills today.     Lab Results   Component Value Date    WBC 7.6 07/24/2019    HGB 14.3 07/24/2019    HCT 42.7 07/24/2019    MCV 85 07/24/2019     07/24/2019       Chemistry        Component Value Date/Time     07/24/2019 1341    K 3.7 07/24/2019 1341     07/24/2019 1341    CO2 21 07/24/2019 1341    BUN 14 07/24/2019 1341    CREATININE 0.97 07/24/2019 1341    GLU 69 07/24/2019 1341        Component Value Date/Time    CALCIUM 9.5 07/24/2019 1341    ALKPHOS 74 07/24/2019 1341    AST 17 07/24/2019 1341    ALT 18 07/24/2019 1341    BILITOT 0.5 07/24/2019 1341    ESTGFRAFRICA >60.0 04/20/2018 0745    EGFRNONAA >60.0 04/20/2018 0745        Lab Results   Component Value Date    TSH 1.290 07/24/2019    Z9HCRBB 7.8 07/24/2019    T3FREE 3.6 07/24/2019               Current Assessment & Plan     Reviewed labs.  Updating labs on medication regimen.  Refills as prescribed. Complete history and physical was completed today.  Complete and thorough medication reconciliation was performed.  Discussed risks and benefits of medications.  Advised patient on orders and health maintenance.  We discussed old records and old labs if available.  Will request any  records not available through epic.  Continue current medications listed on your summary sheet.           Vitamin D deficiency (Chronic)    Overview     Chronic.  Untreated.  Patient taking vitamin-D supplementation.  Last vitamin-D level was low.  Lab Results   Component Value Date    UUXKFGCD89ZN 18.6 (L) 07/24/2019              Current Assessment & Plan     Check vitamin-D level.  Continue supplementation.         Hypotestosteronemia (Chronic)    Overview     Chronic.  Stable.  Patient reporting compliance with testosterone injections.  Lab Results   Component Value Date    TESTOSTERONE 221 (L) 07/24/2019    Patient is due for level check and hemoglobin check.  Reports symptoms improved.         Current Assessment & Plan     Check testosterone and hemoglobin.  Associated with fatigue.  Chronic.  Starting testosterone 50 mg every 2 weeks.  Recheck level in 3 months.  Advised of risk and benefits of testosterone hormone replacement.         Globus sensation    Overview     Chronic.  Uncontrolled.  Patient has been allergy tested in the past.  Patient has seen ENT in the past.  Has had procedures.  Patient taking Astelin and Zyrtec.  No relief.  Patient feels like he has constant drainage in his throat.         Current Assessment & Plan     Referral to ENT.  ER precautions if having throat swelling. Continue Astelin and Zyrtec.                Past Medical History:  Past Medical History:   Diagnosis Date    Depression 12/7/2011    Encounter for long-term (current) use of medications 7/23/2019 07/23/2019  Patient is on CHRONIC long-term drug therapy for managed conditions. See medication list. Reports compliance.  No side effects reported.  Routine lab work is being monitored.  Patient does  need refills today.   Lab Results Component Value Date  WBC 6.34 09/06/2018  HGB 14.1 09/06/2018  HCT 42.6 09/06/2018  MCV 86 09/06/2018   09/06/2018   CMP Sodium Date Value Ref Range Status     GERD (gastroesophageal  reflux disease)     Hypercholesterolemia 2/20/2013    Chronic.  Uncontrolled.  Patient not on medications currently.  Patient has been on lovastatin in the past.  Not adherent to diet.  Lab Results Component Value Date  CHOL 213 (H) 07/24/2019  CHOL 162 09/06/2018  CHOL 181 04/20/2018  Lab Results Component Value Date  HDL 43 07/24/2019  HDL 33 (L) 09/06/2018  HDL 37 (L) 04/20/2018  Lab Results Component Value Date  LDLCALC 139 (H) 07/24/2019  LDLCAL    Hypertension 12/7/2011    Hypotestosteronemia 8/15/2019    Chronic .  Untreated. Lab Results Component Value Date  TESTOSTERONE 221 (L) 07/24/2019  Patient interested in starting replacement.    Obesity 12/7/2011    Vitamin D deficiency 8/15/2019    Chronic.  Untreated.  Patient taking vitamin-D supplementation.  Last vitamin-D level was low.     Past Surgical History:   Procedure Laterality Date    bladder      Pt was having bladder control problems.  Bladder was stretched approx 5 yrs old    SINUS SURGERY       Review of patient's allergies indicates:   Allergen Reactions    Escitalopram oxalate Other (See Comments) and Palpitations     Medication List with Changes/Refills   New Medications    AMLODIPINE (NORVASC) 10 MG TABLET    Take 1 tablet (10 mg total) by mouth once daily.   Current Medications    BENAZEPRIL-HYDROCHLORTHIAZIDE (LOTENSIN HCT) 20-25 MG TAB    Take 1 tablet by mouth once daily.    CETIRIZINE (ZYRTEC) 10 MG TABLET    Take 10 mg by mouth once daily.    ERGOCALCIFEROL (ERGOCALCIFEROL) 50,000 UNIT CAP    Take 1 capsule (50,000 Units total) by mouth every 7 days.    FAMOTIDINE (PEPCID) 20 MG TABLET        FLUTICASONE PROPIONATE (FLONASE ALLERGY RELIEF) 50 MCG/ACTUATION NASAL SPRAY        LOVASTATIN (MEVACOR) 20 MG TABLET    Take 1 tablet (20 mg total) by mouth every evening.    SERTRALINE (ZOLOFT) 100 MG TABLET    Take 1 tablet (100 mg total) by mouth once daily.   Changed and/or Refilled Medications    Modified Medication Previous Medication     AZELASTINE (ASTELIN) 137 MCG (0.1 %) NASAL SPRAY azelastine (ASTELIN) 137 mcg (0.1 %) nasal spray       INSTILL 1 SPRAY IN EACH NOSTRIL TWO TIMES A DAY AS DIRECTED    INSTILL 1 SPRAY IN EACH NOSTRIL TWO TIMES A DAY AS DIRECTED   Discontinued Medications    AMLODIPINE (NORVASC) 5 MG TABLET    Take 1 tablet (5 mg total) by mouth once daily.    PANTOPRAZOLE (PROTONIX) 40 MG TABLET          Social History     Tobacco Use    Smoking status: Never Smoker    Smokeless tobacco: Never Used   Substance Use Topics    Alcohol use: Yes     Alcohol/week: 1.0 standard drinks     Types: 1 Shots of liquor per week     Frequency: Monthly or less     Drinks per session: 1 or 2     Binge frequency: Less than monthly     Comment: 2 drinks per month      Family History   Problem Relation Age of Onset    Hypertension Mother     COPD Father     Hypertension Father     No Known Problems Sister     Allergic rhinitis Neg Hx     Allergies Neg Hx     Angioedema Neg Hx     Asthma Neg Hx     Atopy Neg Hx     Eczema Neg Hx     Immunodeficiency Neg Hx     Rhinitis Neg Hx     Urticaria Neg Hx        I have reviewed the complete PMH, social history, surgical history, allergies and medications.  As well as family history.    Review of Systems   Constitutional: Negative for activity change and unexpected weight change.   HENT: Negative for hearing loss, rhinorrhea and trouble swallowing.    Eyes: Positive for visual disturbance. Negative for discharge.   Respiratory: Negative for chest tightness and wheezing.    Cardiovascular: Negative for chest pain and palpitations.   Gastrointestinal: Negative for blood in stool, constipation, diarrhea and vomiting.   Endocrine: Positive for polydipsia. Negative for polyuria.   Genitourinary: Negative for difficulty urinating, hematuria and urgency.   Musculoskeletal: Positive for arthralgias. Negative for joint swelling and neck pain.   Neurological: Positive for headaches. Negative for  "weakness.   Psychiatric/Behavioral: Negative for confusion and dysphoric mood.       Objective:     /86   Pulse 88   Temp 97.8 °F (36.6 °C) (Oral)   Ht 5' 10" (1.778 m)   Wt (!) 139.3 kg (307 lb 3.2 oz)   BMI 44.08 kg/m²     Physical Exam   Constitutional: He is oriented to person, place, and time. He appears well-developed and well-nourished. No distress.   HENT:   Head: Normocephalic and atraumatic.   Right Ear: External ear normal.   Left Ear: External ear normal.   Nose: Nose normal.   Mouth/Throat: Oropharynx is clear and moist. No oropharyngeal exudate.   Eyes: Pupils are equal, round, and reactive to light. EOM are normal.   Neck: Normal range of motion. Neck supple.   Cardiovascular: Normal rate, regular rhythm, normal heart sounds and intact distal pulses.   No murmur heard.  Pulmonary/Chest: Effort normal and breath sounds normal. No respiratory distress. He has no wheezes.   Abdominal: Soft. Bowel sounds are normal. He exhibits no distension.   Musculoskeletal: Normal range of motion. He exhibits no edema.   Neurological: He is alert and oriented to person, place, and time. No cranial nerve deficit.   Skin: Skin is warm and dry. Capillary refill takes less than 2 seconds.   Psychiatric: He has a normal mood and affect. His behavior is normal.   Nursing note and vitals reviewed.      Assessment:     1. Hypertension, essential    2. Vitamin D deficiency    3. Hypotestosteronemia    4. Hypercholesterolemia    5. Encounter for long-term (current) use of medications    6. Globus sensation        MDM:     I have Reviewed and summarized old records.  I have performed thorough medication reconciliation today and discussed risk and benefits of each medication.  I have reviewed and ordered labs and discussed with patient.  All questions were answered.  I am requesting old records and will review them once they are available.    Follow up in about 3 months (around 1/31/2020), or if symptoms worsen or fail " to improve, for HTN, Med refills, LAB RESULTS.    If no improvement in symptoms or symptoms worsen, advised to call/follow-up at clinic or go to ER. Patient voiced understanding and all questions/concerns were addressed.     DISCLAIMER: This note was compiled by using a speech recognition dictation system and therefore please be aware that typographical / speech recognition errors can and do occur.  Please contact me if you see any errors specifically.    Randal Keenan M.D.         We Offer Telehealth & Same Day Appointments!   Book your Telehealth appointment with me through my nurse or   Clinic appointments on [facility]!    Office: [phone]     Check out my Facebook Page and Follow Me at: CLICK HERE    Check out my website at [employer] by clicking on: CLICK HERE    To Schedule appointments online, go to [employer]: CLICK HERE     Location: [email or web address]    [address] [place] [address]    FAX: [phone]

## 2019-10-31 ENCOUNTER — OFFICE VISIT (OUTPATIENT)
Dept: FAMILY MEDICINE | Facility: CLINIC | Age: 34
End: 2019-10-31
Payer: COMMERCIAL

## 2019-10-31 VITALS
HEIGHT: 70 IN | WEIGHT: 307.19 LBS | HEART RATE: 88 BPM | SYSTOLIC BLOOD PRESSURE: 130 MMHG | BODY MASS INDEX: 43.98 KG/M2 | DIASTOLIC BLOOD PRESSURE: 86 MMHG | TEMPERATURE: 98 F

## 2019-10-31 DIAGNOSIS — R09.A2 GLOBUS SENSATION: ICD-10-CM

## 2019-10-31 DIAGNOSIS — E78.00 HYPERCHOLESTEROLEMIA: ICD-10-CM

## 2019-10-31 DIAGNOSIS — Z79.899 ENCOUNTER FOR LONG-TERM (CURRENT) USE OF MEDICATIONS: ICD-10-CM

## 2019-10-31 DIAGNOSIS — E34.9 HYPOTESTOSTERONEMIA: ICD-10-CM

## 2019-10-31 DIAGNOSIS — E55.9 VITAMIN D DEFICIENCY: ICD-10-CM

## 2019-10-31 DIAGNOSIS — I10 HYPERTENSION, ESSENTIAL: Primary | ICD-10-CM

## 2019-10-31 PROCEDURE — 3075F SYST BP GE 130 - 139MM HG: CPT | Mod: CPTII,S$GLB,, | Performed by: FAMILY MEDICINE

## 2019-10-31 PROCEDURE — 99999 PR PBB SHADOW E&M-EST. PATIENT-LVL IV: ICD-10-PCS | Mod: PBBFAC,,, | Performed by: FAMILY MEDICINE

## 2019-10-31 PROCEDURE — 3008F BODY MASS INDEX DOCD: CPT | Mod: CPTII,S$GLB,, | Performed by: FAMILY MEDICINE

## 2019-10-31 PROCEDURE — 99999 PR PBB SHADOW E&M-EST. PATIENT-LVL IV: CPT | Mod: PBBFAC,,, | Performed by: FAMILY MEDICINE

## 2019-10-31 PROCEDURE — 3008F PR BODY MASS INDEX (BMI) DOCUMENTED: ICD-10-PCS | Mod: CPTII,S$GLB,, | Performed by: FAMILY MEDICINE

## 2019-10-31 PROCEDURE — 3079F DIAST BP 80-89 MM HG: CPT | Mod: CPTII,S$GLB,, | Performed by: FAMILY MEDICINE

## 2019-10-31 PROCEDURE — 3075F PR MOST RECENT SYSTOLIC BLOOD PRESS GE 130-139MM HG: ICD-10-PCS | Mod: CPTII,S$GLB,, | Performed by: FAMILY MEDICINE

## 2019-10-31 PROCEDURE — 3079F PR MOST RECENT DIASTOLIC BLOOD PRESSURE 80-89 MM HG: ICD-10-PCS | Mod: CPTII,S$GLB,, | Performed by: FAMILY MEDICINE

## 2019-10-31 PROCEDURE — 96372 THER/PROPH/DIAG INJ SC/IM: CPT | Mod: S$GLB,,, | Performed by: FAMILY MEDICINE

## 2019-10-31 PROCEDURE — 96372 PR INJECTION,THERAP/PROPH/DIAG2ST, IM OR SUBCUT: ICD-10-PCS | Mod: S$GLB,,, | Performed by: FAMILY MEDICINE

## 2019-10-31 PROCEDURE — 99214 OFFICE O/P EST MOD 30 MIN: CPT | Mod: 25,S$GLB,, | Performed by: FAMILY MEDICINE

## 2019-10-31 PROCEDURE — 99214 PR OFFICE/OUTPT VISIT, EST, LEVL IV, 30-39 MIN: ICD-10-PCS | Mod: 25,S$GLB,, | Performed by: FAMILY MEDICINE

## 2019-10-31 RX ORDER — TESTOSTERONE CYPIONATE 200 MG/ML
50 INJECTION, SOLUTION INTRAMUSCULAR
Status: COMPLETED | OUTPATIENT
Start: 2019-10-31 | End: 2019-10-31

## 2019-10-31 RX ORDER — AZELASTINE 1 MG/ML
SPRAY, METERED NASAL 2 TIMES DAILY
Qty: 30 ML | Refills: 11 | Status: SHIPPED | OUTPATIENT
Start: 2019-10-31 | End: 2021-02-13 | Stop reason: SDUPTHER

## 2019-10-31 RX ORDER — AMLODIPINE BESYLATE 10 MG/1
10 TABLET ORAL DAILY
Qty: 30 TABLET | Refills: 11 | Status: SHIPPED | OUTPATIENT
Start: 2019-10-31 | End: 2020-09-04

## 2019-10-31 RX ORDER — TESTOSTERONE CYPIONATE 200 MG/ML
50 INJECTION, SOLUTION INTRAMUSCULAR
Status: DISCONTINUED | OUTPATIENT
Start: 2019-11-01 | End: 2020-01-30

## 2019-10-31 RX ADMIN — TESTOSTERONE CYPIONATE 50 MG: 200 INJECTION, SOLUTION INTRAMUSCULAR at 07:10

## 2019-10-31 NOTE — ASSESSMENT & PLAN NOTE
Increase amlodipine to 10 mg.  Continue digital medicine hypertension program.  Checking labs.    Discussed hypertension disease course.  Discussed-DASH-diet and exercise.  Discussed medication regimen importance of treating high blood pressure.  Patient understood and advised of risk of untreated blood pressure.  ER precautions were given for symptoms of hypertensive urgency and emergency.

## 2019-10-31 NOTE — ASSESSMENT & PLAN NOTE
Check testosterone and hemoglobin.  Associated with fatigue.  Chronic.  Starting testosterone 50 mg every 2 weeks.  Recheck level in 3 months.  Advised of risk and benefits of testosterone hormone replacement.

## 2019-10-31 NOTE — PROGRESS NOTES
Patient states that he will arrange his lab appointment in the [facility] where he works.

## 2019-10-31 NOTE — PATIENT INSTRUCTIONS
Follow up in about 3 months (around 1/31/2020), or if symptoms worsen or fail to improve, for HTN, Med refills, LAB RESULTS.     If no improvement in symptoms or symptoms worsen, please be advised to call MD, follow-up at clinic and/or go to ER if becomes severe.

## 2019-10-31 NOTE — ASSESSMENT & PLAN NOTE
Reviewed labs.  Updating labs on medication regimen.  Refills as prescribed. Complete history and physical was completed today.  Complete and thorough medication reconciliation was performed.  Discussed risks and benefits of medications.  Advised patient on orders and health maintenance.  We discussed old records and old labs if available.  Will request any records not available through epic.  Continue current medications listed on your summary sheet.

## 2019-10-31 NOTE — ASSESSMENT & PLAN NOTE
Due for fasting lipid panel.Discussed hyperlipidemia disease course.  Discussed the risk of cardiovascular disease, increase stroke and heart attack risk.  Patient voiced understanding and understood the treatment plan. All questions were answered.  Discussed healthy diet and increased need for exercise.

## 2019-11-14 ENCOUNTER — CLINICAL SUPPORT (OUTPATIENT)
Dept: FAMILY MEDICINE | Facility: CLINIC | Age: 34
End: 2019-11-14
Payer: COMMERCIAL

## 2019-11-14 DIAGNOSIS — E34.9 HYPOTESTOSTERONEMIA: Primary | ICD-10-CM

## 2019-11-14 PROCEDURE — 99211 PR OFFICE/OUTPT VISIT, EST, LEVL I: ICD-10-PCS | Mod: S$GLB,,, | Performed by: FAMILY MEDICINE

## 2019-11-14 PROCEDURE — 99211 OFF/OP EST MAY X REQ PHY/QHP: CPT | Mod: S$GLB,,, | Performed by: FAMILY MEDICINE

## 2019-11-14 PROCEDURE — 96372 PR INJECTION,THERAP/PROPH/DIAG2ST, IM OR SUBCUT: ICD-10-PCS | Mod: S$GLB,,, | Performed by: FAMILY MEDICINE

## 2019-11-14 PROCEDURE — 96372 THER/PROPH/DIAG INJ SC/IM: CPT | Mod: S$GLB,,, | Performed by: FAMILY MEDICINE

## 2019-11-14 RX ADMIN — TESTOSTERONE CYPIONATE 50 MG: 200 INJECTION, SOLUTION INTRAMUSCULAR at 09:11

## 2019-11-18 ENCOUNTER — TELEPHONE (OUTPATIENT)
Dept: PSYCHIATRY | Facility: CLINIC | Age: 34
End: 2019-11-18

## 2019-12-03 ENCOUNTER — TELEPHONE (OUTPATIENT)
Dept: FAMILY MEDICINE | Facility: CLINIC | Age: 34
End: 2019-12-03

## 2019-12-03 ENCOUNTER — PATIENT MESSAGE (OUTPATIENT)
Dept: OTHER | Facility: OTHER | Age: 34
End: 2019-12-03

## 2019-12-03 NOTE — TELEPHONE ENCOUNTER
Attempted to call patient, left a message on voicemail asking patient to contact us or the hypertension digital medicine program as frequent missed call from them can result in possibly being discharged from their program per previous note.

## 2019-12-03 NOTE — TELEPHONE ENCOUNTER
----- Message from Kelly Pacheco sent at 12/3/2019  2:26 PM CST -----  Regarding: Encourage Patient Participation in Digital Medicine Program  Dr. Randal Keenan,    Your patient Mr. Karlos Clark is enrolled in the HTN Digital Medicine program. Unfortunately, we have been unable to maintain contact with him to continue monitoring, despite our best efforts.     If you still believe this patient is a good candidate for digital medicine, please reach out to him to encourage participation. If going forward we are unable to communicate with him, we will unfortunately have to discharge him from the program.    Please let me know if you have any questions.    Sincerely,  Kelly Pacheco  [phone]

## 2019-12-04 ENCOUNTER — PATIENT MESSAGE (OUTPATIENT)
Dept: FAMILY MEDICINE | Facility: CLINIC | Age: 34
End: 2019-12-04

## 2019-12-04 RX ORDER — TESTOSTERONE CYPIONATE 1000 MG/10ML
50 INJECTION, SOLUTION INTRAMUSCULAR
Qty: 10 ML | Refills: 3 | Status: SHIPPED | OUTPATIENT
Start: 2019-12-04 | End: 2020-01-11 | Stop reason: SDUPTHER

## 2019-12-04 RX ORDER — LOVASTATIN 20 MG/1
TABLET ORAL
COMMUNITY
Start: 2019-11-17 | End: 2020-01-22

## 2019-12-04 NOTE — TELEPHONE ENCOUNTER
Patient requesting a prescription of testosterone be sent to [facility], medication pended, please advise.

## 2019-12-04 NOTE — PROGRESS NOTES
"Pt contacted me via [employer].    "Hi Kelly!     Sorry I missed your call, I was in with a patient. I'm doing well! I noticed that my readings have not been transmitting. I will try to troubleshoot the monica and see if I can get this corrected. If there is anything I need to do, please let me know.     Thanks,   Karlos Clark"    I replied via [employer]:   "Hi Mr. Everett,     Thanks for your message. I am glad to hear that you're doing well! It looks like a reading you took this morning came through so hopefully the issue is resolved. If it continues to be a problem, let me know and I'll get you connected with our tech support team.     Your blood pressure continues to be at goal. If you would like support with any health goals you're working on, feel free to give me a call or we can message about it. If there is a best time of day or best day of the week to reach you, please let me know!"    "

## 2019-12-05 ENCOUNTER — PATIENT MESSAGE (OUTPATIENT)
Dept: FAMILY MEDICINE | Facility: CLINIC | Age: 34
End: 2019-12-05

## 2019-12-13 ENCOUNTER — PATIENT MESSAGE (OUTPATIENT)
Dept: FAMILY MEDICINE | Facility: CLINIC | Age: 34
End: 2019-12-13

## 2019-12-13 NOTE — TELEPHONE ENCOUNTER
Patient is requesting 21gauge 1 1/2 needles and 3 cc syringes be sent to [facility] so that he can give himself his testosterone injections.  Please advise.

## 2019-12-30 ENCOUNTER — PATIENT OUTREACH (OUTPATIENT)
Dept: OTHER | Facility: OTHER | Age: 34
End: 2019-12-30

## 2020-01-03 ENCOUNTER — PATIENT MESSAGE (OUTPATIENT)
Dept: OTHER | Facility: OTHER | Age: 35
End: 2020-01-03

## 2020-01-03 NOTE — PROGRESS NOTES
"Digital Medicine: Health  Follow-Up    Pt contacted me via [employer]: "Hi Kelly! Sorry to miss your call, I'm doing well. New Year, new me, or at least a slightly different me, lol. I've struggled with weight loss this past year, so I've joined weight watchers to try to gain some momentum and support to finally get my weight down, and hopefully reign in some of my health problems in the process! Happy New Year!"    I replied: "No worries, thanks for your message! I am glad to hear you are doing well. I think it's great that you're taking the time to work on that. Weight Watchers is great for the support/community aspect. Your blood pressure continues to be at goal, if there are any other resources/recipes I can provide or goals you'd like to discuss, let me know! Happy New Year to you as well!"    The history is provided by the patient.     Follow Up  Follow-up reason(s): routine education          INTERVENTION(S)  encouragement/support    PLAN  patient verbalizes understanding, patient amenable to changes and continue monitoring      There are no preventive care reminders to display for this patient.    Last 5 Patient Entered Readings                                      Current 30 Day Average: 122/85     Recent Readings 1/1/2020 12/31/2019 12/31/2019 12/27/2019 12/23/2019    SBP (mmHg) 120 121 133 113 117    DBP (mmHg) 89 86 92 88 77    Pulse 74 90 98 106 93                      Diet Screening   He has the following dietary restrictions: weight-loss    Pt says he is starting [employer].      SDOH  "

## 2020-01-10 ENCOUNTER — LAB VISIT (OUTPATIENT)
Dept: LAB | Facility: HOSPITAL | Age: 35
End: 2020-01-10
Attending: FAMILY MEDICINE
Payer: COMMERCIAL

## 2020-01-10 DIAGNOSIS — E55.9 VITAMIN D DEFICIENCY: ICD-10-CM

## 2020-01-10 DIAGNOSIS — Z12.5 ENCOUNTER FOR PROSTATE CANCER SCREENING: ICD-10-CM

## 2020-01-10 DIAGNOSIS — E34.9 HYPOTESTOSTERONEMIA: ICD-10-CM

## 2020-01-10 DIAGNOSIS — Z79.899 ENCOUNTER FOR LONG-TERM (CURRENT) USE OF MEDICATIONS: ICD-10-CM

## 2020-01-10 DIAGNOSIS — E78.00 HYPERCHOLESTEROLEMIA: ICD-10-CM

## 2020-01-10 DIAGNOSIS — I10 HYPERTENSION, ESSENTIAL: ICD-10-CM

## 2020-01-10 LAB
ALBUMIN SERPL BCP-MCNC: 4.3 G/DL (ref 3.5–5.2)
ALP SERPL-CCNC: 69 U/L (ref 55–135)
ALT SERPL W/O P-5'-P-CCNC: 18 U/L (ref 10–44)
ANION GAP SERPL CALC-SCNC: 12 MMOL/L (ref 8–16)
AST SERPL-CCNC: 17 U/L (ref 10–40)
BASOPHILS # BLD AUTO: 0.07 K/UL (ref 0–0.2)
BASOPHILS NFR BLD: 1.1 % (ref 0–1.9)
BILIRUB DIRECT SERPL-MCNC: 0.2 MG/DL (ref 0.1–0.3)
BILIRUB SERPL-MCNC: 0.5 MG/DL (ref 0.1–1)
BUN SERPL-MCNC: 18 MG/DL (ref 6–20)
CALCIUM SERPL-MCNC: 9.5 MG/DL (ref 8.7–10.5)
CHLORIDE SERPL-SCNC: 105 MMOL/L (ref 95–110)
CHOLEST SERPL-MCNC: 194 MG/DL (ref 120–199)
CHOLEST/HDLC SERPL: 4.7 {RATIO} (ref 2–5)
CO2 SERPL-SCNC: 25 MMOL/L (ref 23–29)
CREAT SERPL-MCNC: 0.9 MG/DL (ref 0.5–1.4)
DIFFERENTIAL METHOD: NORMAL
EOSINOPHIL # BLD AUTO: 0.2 K/UL (ref 0–0.5)
EOSINOPHIL NFR BLD: 3.6 % (ref 0–8)
ERYTHROCYTE [DISTWIDTH] IN BLOOD BY AUTOMATED COUNT: 12.5 % (ref 11.5–14.5)
EST. GFR  (AFRICAN AMERICAN): >60 ML/MIN/1.73 M^2
EST. GFR  (NON AFRICAN AMERICAN): >60 ML/MIN/1.73 M^2
GLUCOSE SERPL-MCNC: 74 MG/DL (ref 70–110)
HCT VFR BLD AUTO: 46.6 % (ref 40–54)
HDLC SERPL-MCNC: 41 MG/DL (ref 40–75)
HDLC SERPL: 21.1 % (ref 20–50)
HGB BLD-MCNC: 15.1 G/DL (ref 14–18)
IMM GRANULOCYTES # BLD AUTO: 0.01 K/UL (ref 0–0.04)
IMM GRANULOCYTES NFR BLD AUTO: 0.2 % (ref 0–0.5)
LDLC SERPL CALC-MCNC: 118.6 MG/DL (ref 63–159)
LYMPHOCYTES # BLD AUTO: 1.8 K/UL (ref 1–4.8)
LYMPHOCYTES NFR BLD: 28.5 % (ref 18–48)
MCH RBC QN AUTO: 28.8 PG (ref 27–31)
MCHC RBC AUTO-ENTMCNC: 32.4 G/DL (ref 32–36)
MCV RBC AUTO: 89 FL (ref 82–98)
MONOCYTES # BLD AUTO: 0.4 K/UL (ref 0.3–1)
MONOCYTES NFR BLD: 5.7 % (ref 4–15)
NEUTROPHILS # BLD AUTO: 3.8 K/UL (ref 1.8–7.7)
NEUTROPHILS NFR BLD: 60.9 % (ref 38–73)
NONHDLC SERPL-MCNC: 153 MG/DL
NRBC BLD-RTO: 0 /100 WBC
PLATELET # BLD AUTO: 220 K/UL (ref 150–350)
PMV BLD AUTO: 10.4 FL (ref 9.2–12.9)
POTASSIUM SERPL-SCNC: 3.9 MMOL/L (ref 3.5–5.1)
PROT SERPL-MCNC: 7.7 G/DL (ref 6–8.4)
RBC # BLD AUTO: 5.25 M/UL (ref 4.6–6.2)
SODIUM SERPL-SCNC: 142 MMOL/L (ref 136–145)
TRIGL SERPL-MCNC: 172 MG/DL (ref 30–150)
WBC # BLD AUTO: 6.17 K/UL (ref 3.9–12.7)

## 2020-01-10 PROCEDURE — 84403 ASSAY OF TOTAL TESTOSTERONE: CPT

## 2020-01-10 PROCEDURE — 80061 LIPID PANEL: CPT

## 2020-01-10 PROCEDURE — 84153 ASSAY OF PSA TOTAL: CPT

## 2020-01-10 PROCEDURE — 82306 VITAMIN D 25 HYDROXY: CPT

## 2020-01-10 PROCEDURE — 36415 COLL VENOUS BLD VENIPUNCTURE: CPT | Mod: PN

## 2020-01-10 PROCEDURE — 80048 BASIC METABOLIC PNL TOTAL CA: CPT

## 2020-01-10 PROCEDURE — 80076 HEPATIC FUNCTION PANEL: CPT

## 2020-01-10 PROCEDURE — 85025 COMPLETE CBC W/AUTO DIFF WBC: CPT

## 2020-01-11 DIAGNOSIS — E34.9 HYPOTESTOSTERONEMIA: Primary | Chronic | ICD-10-CM

## 2020-01-11 LAB
25(OH)D3+25(OH)D2 SERPL-MCNC: 21 NG/ML (ref 30–96)
COMPLEXED PSA SERPL-MCNC: 0.95 NG/ML (ref 0–4)
TESTOST SERPL-MCNC: 146 NG/DL (ref 304–1227)

## 2020-01-11 RX ORDER — TESTOSTERONE CYPIONATE 1000 MG/10ML
200 INJECTION, SOLUTION INTRAMUSCULAR
Qty: 10 ML | Refills: 3 | Status: SHIPPED | OUTPATIENT
Start: 2020-01-11 | End: 2020-01-30

## 2020-01-11 RX ORDER — ERGOCALCIFEROL 1.25 MG/1
CAPSULE ORAL
Qty: 4 CAPSULE | Refills: 5 | Status: SHIPPED | OUTPATIENT
Start: 2020-01-11 | End: 2021-02-13 | Stop reason: SDUPTHER

## 2020-01-11 NOTE — PROGRESS NOTES
Please CALL patient with results and Document verification.   Your testosterone remains low.  Will increase dose of two week injection to 200 mg.  Recheck in three months.  I have signed for the following orders AND/OR meds.  Please call the patient and ask the patient to schedule the testing AND/OR inform about any medications that were sent.      No orders of the defined types were placed in this encounter.      Medications Ordered This Encounter  Medications   testosterone cypionate (DEPO-TESTOSTERONE) 100 mg/mL injection    Sig: Inject 2 mLs (200 mg total) into the muscle every 14 (fourteen) days.    Dispense:  10 mL    Refill:  3 [phone]    There are no preventive care reminders to display for this patient.

## 2020-01-11 NOTE — PROGRESS NOTES
#CALL THE PATIENT# to discuss results/see if they have questions and document verification. Make FU appt if needed.    #Pend me the orders in my interpretation below.#    I have sent a message to them with the interpretation (see below).  See if they have any questions and make a follow-up appointment if not already schedule and if needed.    Also please address any outstanding health maintenance that may be due: There are no preventive care reminders to display for this patient.  ====================================    My interpretation that was sent to them through [employer]:    Karlos, I have reviewed your recent blood work.     Your complete blood count is stable and within normal limits.    Your metabolic panel which shows your glucose, kidney function, electrolytes, and hepatic function test showing liver function is normal.   Your PSA is normal.  Your cholesterol is improved from previous with total cholesterol however your triglycerides have increased slightly but still improved overall.  Continue medication regimen.  Continue to work on diet and exercise.  Repeat lipids in 6 to 12 months.    Your vitamin-D is still low but slightly improved from previous.  I recommend increasing supplementation with over-the-counter vitamin-D in addition to the 11680 units weekly  You testosterone is pending.    Plan is to repeat hepatic function CBC testosterone in six months.  Repeat all of the labs in one year.

## 2020-01-11 NOTE — PROGRESS NOTES
I have signed for the following orders AND/OR meds.  Please call the patient and ask the patient to schedule the testing AND/OR inform about any medications that were sent.      No orders of the defined types were placed in this encounter.      Medications Ordered This Encounter   Medications    testosterone cypionate (DEPO-TESTOSTERONE) 100 mg/mL injection     Sig: Inject 2 mLs (200 mg total) into the muscle every 14 (fourteen) days.     Dispense:  10 mL     Refill:  3

## 2020-01-13 ENCOUNTER — TELEPHONE (OUTPATIENT)
Dept: FAMILY MEDICINE | Facility: CLINIC | Age: 35
End: 2020-01-13

## 2020-01-13 DIAGNOSIS — Z09 FOLLOW UP: ICD-10-CM

## 2020-01-13 DIAGNOSIS — E78.00 HYPERCHOLESTEROLEMIA: Primary | ICD-10-CM

## 2020-01-13 DIAGNOSIS — E34.9 HYPOTESTOSTERONEMIA: ICD-10-CM

## 2020-01-13 RX ORDER — SERTRALINE HYDROCHLORIDE 100 MG/1
TABLET, FILM COATED ORAL
Qty: 30 TABLET | Refills: 2 | OUTPATIENT
Start: 2020-01-13

## 2020-01-13 NOTE — TELEPHONE ENCOUNTER
----- Message from Randal Keenan MD sent at 1/11/2020  6:42 AM CST -----  #CALL THE PATIENT# to discuss results/see if they have questions and document verification. Make FU appt if needed.    #Pend me the orders in my interpretation below.#    I have sent a message to them with the interpretation (see below).  See if they have any questions and make a follow-up appointment if not already schedule and if needed.    Also please address any outstanding health maintenance that may be due: There are no preventive care reminders to display for this patient.  ====================================    My interpretation that was sent to them through [employer]:    Karlos, I have reviewed your recent blood work.     Your complete blood count is stable and within normal limits.    Your metabolic panel which shows your glucose, kidney function, electrolytes, and hepatic function test showing liver function is normal.   Your PSA is normal.  Your cholesterol is improved from previous with total cholesterol however your triglycerides have increased slightly but still improved overall.  Continue medication regimen.  Continue to work on diet and exercise.  Repeat lipids in 6 to 12 months.    Your vitamin-D is still low but slightly improved from previous.  I recommend increasing supplementation with over-the-counter vitamin-D in addition to the 28118 units weekly  You testosterone is pending.    Plan is to repeat hepatic function CBC testosterone in six months.  Repeat all of the labs in one year.

## 2020-01-14 ENCOUNTER — PATIENT MESSAGE (OUTPATIENT)
Dept: FAMILY MEDICINE | Facility: CLINIC | Age: 35
End: 2020-01-14

## 2020-01-14 RX ORDER — SERTRALINE HYDROCHLORIDE 100 MG/1
100 TABLET, FILM COATED ORAL DAILY
Qty: 90 TABLET | Refills: 3 | Status: SHIPPED | OUTPATIENT
Start: 2020-01-14 | End: 2020-10-24 | Stop reason: SDUPTHER

## 2020-01-21 RX ORDER — TESTOSTERONE CYPIONATE 200 MG/ML
200 INJECTION, SOLUTION INTRAMUSCULAR
Qty: 6 ML | Refills: 3 | Status: SHIPPED | OUTPATIENT
Start: 2020-01-21 | End: 2020-05-01

## 2020-01-21 NOTE — TELEPHONE ENCOUNTER
----- Message from Rosetta Alberto sent at 1/21/2020 10:09 AM CST -----  Contact: [facility]  Type:  Pharmacy Calling to Clarify an RX    Name of Caller:[place]  Pharmacy Name:[facility]  Prescription Name:testosterone cypionate (DEPO-TESTOSTERONE) 100 mg/mL injection  What do they need to clarify?:requesting to have the directions and quantity updated on prescription   Best Call Back Number:[phone]  Additional Information:  Reference number [id number]

## 2020-01-22 ENCOUNTER — OFFICE VISIT (OUTPATIENT)
Dept: CARDIOLOGY | Facility: CLINIC | Age: 35
End: 2020-01-22
Payer: COMMERCIAL

## 2020-01-22 VITALS
SYSTOLIC BLOOD PRESSURE: 131 MMHG | HEIGHT: 70 IN | WEIGHT: 315 LBS | BODY MASS INDEX: 45.1 KG/M2 | HEART RATE: 75 BPM | DIASTOLIC BLOOD PRESSURE: 93 MMHG

## 2020-01-22 DIAGNOSIS — I48.0 PAROXYSMAL ATRIAL FIBRILLATION: Primary | ICD-10-CM

## 2020-01-22 DIAGNOSIS — I10 ESSENTIAL HYPERTENSION: ICD-10-CM

## 2020-01-22 DIAGNOSIS — E78.2 MIXED HYPERLIPIDEMIA: ICD-10-CM

## 2020-01-22 PROCEDURE — 3008F BODY MASS INDEX DOCD: CPT | Mod: CPTII,S$GLB,, | Performed by: INTERNAL MEDICINE

## 2020-01-22 PROCEDURE — 3075F SYST BP GE 130 - 139MM HG: CPT | Mod: CPTII,S$GLB,, | Performed by: INTERNAL MEDICINE

## 2020-01-22 PROCEDURE — 99999 PR PBB SHADOW E&M-EST. PATIENT-LVL III: CPT | Mod: PBBFAC,,, | Performed by: INTERNAL MEDICINE

## 2020-01-22 PROCEDURE — 3008F PR BODY MASS INDEX (BMI) DOCUMENTED: ICD-10-PCS | Mod: CPTII,S$GLB,, | Performed by: INTERNAL MEDICINE

## 2020-01-22 PROCEDURE — 99204 OFFICE O/P NEW MOD 45 MIN: CPT | Mod: S$GLB,,, | Performed by: INTERNAL MEDICINE

## 2020-01-22 PROCEDURE — 3075F PR MOST RECENT SYSTOLIC BLOOD PRESS GE 130-139MM HG: ICD-10-PCS | Mod: CPTII,S$GLB,, | Performed by: INTERNAL MEDICINE

## 2020-01-22 PROCEDURE — 3080F DIAST BP >= 90 MM HG: CPT | Mod: CPTII,S$GLB,, | Performed by: INTERNAL MEDICINE

## 2020-01-22 PROCEDURE — 99204 PR OFFICE/OUTPT VISIT, NEW, LEVL IV, 45-59 MIN: ICD-10-PCS | Mod: S$GLB,,, | Performed by: INTERNAL MEDICINE

## 2020-01-22 PROCEDURE — 99999 PR PBB SHADOW E&M-EST. PATIENT-LVL III: ICD-10-PCS | Mod: PBBFAC,,, | Performed by: INTERNAL MEDICINE

## 2020-01-22 PROCEDURE — 3080F PR MOST RECENT DIASTOLIC BLOOD PRESSURE >= 90 MM HG: ICD-10-PCS | Mod: CPTII,S$GLB,, | Performed by: INTERNAL MEDICINE

## 2020-01-22 RX ORDER — PANTOPRAZOLE SODIUM 40 MG/1
TABLET, DELAYED RELEASE ORAL
COMMUNITY
Start: 2019-12-10 | End: 2020-02-18

## 2020-01-22 RX ORDER — ATORVASTATIN CALCIUM 40 MG/1
40 TABLET, FILM COATED ORAL DAILY
Qty: 90 TABLET | Refills: 3 | Status: SHIPPED | OUTPATIENT
Start: 2020-01-22 | End: 2020-11-11

## 2020-01-22 NOTE — PROGRESS NOTES
Subjective:    Patient ID:  Karlos Clark is a 34 y.o. male who presents for evaluation of new pt (hospital follow up )      Problem List Items Addressed This Visit        Cardiac/Vascular    Essential hypertension    Overview     Initial HPI:  Chronic.  Uncontrolled.  Patient on benazepril hydrochlorothiazide combination.  Reports compliance.  No side effects reported.  Denies any chest pain shortness of breath blurred vision.   BP Readings from Last 3 Encounters:   07/23/19 (!) 138/90   09/05/18 (!) 140/88   07/23/18 (!) 138/99    ======================================================  Update August 15, 2019.  Blood pressure still uncontrolled.  Patient reports compliance with medications.  Patient reports increased stress after getting of work today.  =======================================================  October 2019:  Chronic.  Borderline uncontrolled.  Patient on digital medicine hypertension program.  Diastolic is starting to increase.  Patient reports compliance with benazepril 20 mg and hydrochlorothiazide 25 mg.  Amlodipine 5 mg.         Relevant Orders    Echo Color Flow Doppler? Yes    Mixed hyperlipidemia    Overview     Initial HPI:  Chronic.  Uncontrolled but started on treatment..  Patient not on medications currently.  Patient has been on lovastatin in the past.  Not adherent to diet.  =======================================================  October 2019:  ======================================================  This condition is chronic.  Currently stable.  Patient reports compliance with hyperlipidemia treatment as prescribed.  No side effects reported at this time.The patient denies any myalgias, chest pain, shortness of breath, abdominal pain, nausea, vomiting, constipation, diarrhea, jaundice, skin changes at this time.    Lab Results   Component Value Date    CHOL 213 (H) 07/24/2019    CHOL 162 09/06/2018    CHOL 181 04/20/2018     Lab Results   Component Value Date    HDL 43 07/24/2019     HDL 33 (L) 09/06/2018    HDL 37 (L) 04/20/2018     Lab Results   Component Value Date    LDLCALC 139 (H) 07/24/2019    LDLCALC 88.6 09/06/2018    LDLCALC 105.4 04/20/2018     Lab Results   Component Value Date    TRIG 156 (H) 07/24/2019    TRIG 202 (H) 09/06/2018    TRIG 193 (H) 04/20/2018     Lab Results   Component Value Date    CHOLHDL 20.4 09/06/2018    CHOLHDL 20.4 04/20/2018    CHOLHDL 15.3 (L) 06/28/2011     Lab Results   Component Value Date    TOTALCHOLEST 4.9 09/06/2018    TOTALCHOLEST 4.9 04/20/2018    TOTALCHOLEST 6.6 (H) 06/28/2011     Lab Results   Component Value Date    ALT 18 07/24/2019    AST 17 07/24/2019    ALKPHOS 74 07/24/2019    BILITOT 0.5 07/24/2019     ======================================================            Paroxysmal atrial fibrillation - Primary    Relevant Orders    Echo Color Flow Doppler? Yes    Polysomnogram (CPAP will be added if patient meets diagnostic criteria.)    Ambulatory referral to Sleep Disorders          HPI    The patient states that he thinks he has been having arrhythmia issues for some time, but it got to be its worst this recent Monday. Had irregular heartbeat and eventually got diagnosed with new onset atrial fibrillation. Went to ED and was converted with basic AV austen agents. Has had minimal symptoms since, but nothing as bad as it was before.    No recent chest pain or shortness of breath.     Personal history of heart attack or stroke - None that he is aware of  Family history of heart disease - Uncle with MI at 37 and has had 3 in total, other uncle with MI at 54, grandmother with CHF, other grandparent with multiple heart attacks.    Past Medical History:   Diagnosis Date    Benign essential hypertension 2/20/2013    Depression 12/7/2011    Encounter for long-term (current) use of medications 7/23/2019 07/23/2019  Patient is on CHRONIC long-term drug therapy for managed conditions. See medication list. Reports compliance.  No side effects  reported.  Routine lab work is being monitored.  Patient does  need refills today.   Lab Results Component Value Date  WBC 6.34 09/06/2018  HGB 14.1 09/06/2018  HCT 42.6 09/06/2018  MCV 86 09/06/2018   09/06/2018   CMP Sodium Date Value Ref Range Status     GERD (gastroesophageal reflux disease)     Hypercholesterolemia 2/20/2013    Chronic.  Uncontrolled.  Patient not on medications currently.  Patient has been on lovastatin in the past.  Not adherent to diet.  Lab Results Component Value Date  CHOL 213 (H) 07/24/2019  CHOL 162 09/06/2018  CHOL 181 04/20/2018  Lab Results Component Value Date  HDL 43 07/24/2019  HDL 33 (L) 09/06/2018  HDL 37 (L) 04/20/2018  Lab Results Component Value Date  LDLCALC 139 (H) 07/24/2019  LDLCAL    Hypertension 12/7/2011    Hypotestosteronemia 8/15/2019    Chronic .  Untreated. Lab Results Component Value Date  TESTOSTERONE 221 (L) 07/24/2019  Patient interested in starting replacement.    Obesity 12/7/2011    Vitamin D deficiency 8/15/2019    Chronic.  Untreated.  Patient taking vitamin-D supplementation.  Last vitamin-D level was low.       Past Surgical History:   Procedure Laterality Date    bladder      Pt was having bladder control problems.  Bladder was stretched approx 7 yrs old    SINUS SURGERY         Family History   Problem Relation Age of Onset    Hypertension Mother     COPD Father     Hypertension Father     No Known Problems Sister     Allergic rhinitis Neg Hx     Allergies Neg Hx     Angioedema Neg Hx     Asthma Neg Hx     Atopy Neg Hx     Eczema Neg Hx     Immunodeficiency Neg Hx     Rhinitis Neg Hx     Urticaria Neg Hx        Social History     Socioeconomic History    Marital status: Single     Spouse name: Not on file    Number of children: Not on file    Years of education: Not on file    Highest education level: Not on file   Occupational History    Not on file   Social Needs    Financial resource strain: Not hard at all    Food  insecurity:     Worry: Never true     Inability: Never true    Transportation needs:     Medical: No     Non-medical: No   Tobacco Use    Smoking status: Never Smoker    Smokeless tobacco: Never Used   Substance and Sexual Activity    Alcohol use: Yes     Alcohol/week: 1.0 standard drinks     Types: 1 Shots of liquor per week     Frequency: Monthly or less     Drinks per session: 1 or 2     Binge frequency: Less than monthly     Comment: 2 drinks per month    Drug use: No    Sexual activity: Not Currently     Partners: Female     Birth control/protection: Condom   Lifestyle    Physical activity:     Days per week: 3 days     Minutes per session: 60 min    Stress: Only a little   Relationships    Social connections:     Talks on phone: More than three times a week     Gets together: Three times a week     Attends [employer] service: More than 4 times per year     Active member of club or organization: Yes     Attends meetings of clubs or organizations: 1 to 4 times per year     Relationship status: Never    Other Topics Concern    Not on file   Social History Narrative    Not on file       Review of patient's allergies indicates:   Allergen Reactions    Escitalopram oxalate Other (See Comments) and Palpitations       Review of Systems   Constitution: Negative for decreased appetite, fever and malaise/fatigue.   Eyes: Negative for blurred vision.   Cardiovascular: Negative for chest pain, dyspnea on exertion, irregular heartbeat and leg swelling.   Respiratory: Negative for cough, hemoptysis, shortness of breath and wheezing.    Endocrine: Negative for cold intolerance and heat intolerance.   Hematologic/Lymphatic: Negative for bleeding problem.   Musculoskeletal: Negative for muscle weakness and myalgias.   Gastrointestinal: Negative for abdominal pain, constipation and diarrhea.   Genitourinary: Negative for bladder incontinence.   Neurological: Negative for dizziness and weakness.  "  Psychiatric/Behavioral: Negative for depression.        Objective:     Vitals:    01/22/20 0855   BP: (!) 131/93   BP Location: Left arm   Patient Position: Sitting   BP Method: Large (Automatic)   Pulse: 75   Weight: (!) 142.9 kg (315 lb 0.6 oz)   Height: 5' 10" (1.778 m)        Physical Exam   Constitutional: He is oriented to person, place, and time. He appears well-developed and well-nourished.   HENT:   Head: Normocephalic and atraumatic.   Neck: Normal range of motion. Neck supple. No JVD present.   Cardiovascular: Normal rate, regular rhythm and normal heart sounds. Exam reveals no gallop and no friction rub.   No murmur heard.  Pulmonary/Chest: Effort normal and breath sounds normal. No respiratory distress. He has no wheezes. He has no rales.   Abdominal: Soft. Bowel sounds are normal. There is no tenderness. There is no rebound and no guarding.   Musculoskeletal: He exhibits no edema.   Neurological: He is alert and oriented to person, place, and time.   Skin: Skin is warm and dry.   Psychiatric: His behavior is normal.           Current Outpatient Medications on File Prior to Visit   Medication Sig    amLODIPine (NORVASC) 10 MG tablet Take 1 tablet (10 mg total) by mouth once daily.    azelastine (ASTELIN) 137 mcg (0.1 %) nasal spray INSTILL 1 SPRAY IN EACH NOSTRIL TWO TIMES A DAY AS DIRECTED    benazepril-hydrochlorthiazide (LOTENSIN HCT) 20-25 mg Tab Take 1 tablet by mouth once daily.    cetirizine (ZYRTEC) 10 MG tablet Take 10 mg by mouth once daily.    famotidine (PEPCID) 20 MG tablet     fluticasone propionate (FLONASE ALLERGY RELIEF) 50 mcg/actuation nasal spray     metoprolol tartrate (LOPRESSOR) 100 MG tablet Take 0.5 tablets (50 mg total) by mouth 2 (two) times daily.    rivaroxaban (XARELTO) 10 mg Tab Take 2 tablets (20 mg total) by mouth every evening.    safety needles 23 gauge x 5/8" Ndle 1 each by Misc.(Non-Drug; Combo Route) route every 14 (fourteen) days.    sertraline (ZOLOFT) " "100 MG tablet Take 1 tablet (100 mg total) by mouth once daily.    syringe with needle, safety 3 mL 18 gauge x 1 1/2" Syrg 1 each by Misc.(Non-Drug; Combo Route) route every 14 (fourteen) days.    testosterone cypionate (DEPO-TESTOSTERONE) 100 mg/mL injection Inject 2 mLs (200 mg total) into the muscle every 14 (fourteen) days.    testosterone cypionate (DEPOTESTOTERONE CYPIONATE) 200 mg/mL injection Inject 1 mL (200 mg total) into the muscle every 14 (fourteen) days.    VITAMIN D2 50,000 unit capsule TAKE 1 CAPSULE BY MOUTH  EVERY 7 DAYS    [DISCONTINUED] lovastatin (MEVACOR) 20 MG tablet     pantoprazole (PROTONIX) 40 MG tablet      Current Facility-Administered Medications on File Prior to Visit   Medication    testosterone cypionate injection 50 mg       Lipid Panel:   Lab Results   Component Value Date    CHOL 194 01/10/2020    HDL 41 01/10/2020    LDLCALC 118.6 01/10/2020    TRIG 172 (H) 01/10/2020    CHOLHDL 21.1 01/10/2020         The ASCVD Risk score (Ossineke DC Jr., et al., 2013) failed to calculate for the following reasons:    The 2013 ASCVD risk score is only valid for ages 40 to 79    All pertinent labs, imaging, and EKGs reviewed.    Assessment:       1. Paroxysmal atrial fibrillation    2. Essential hypertension    3. Mixed hyperlipidemia         Plan:     Gets symptomatic with aFib  BP borderline today    Echocardiogram   Sleep apnea referral and sleep study  Continue digital hypertension program   Continue Xarelto 20 mg PO nightly to be taken with largest meal of the day  Will consider Holter Monitor in the future after modifiable risk factors are helped  Change lovastatin to atorvastatin 40mg PO daily     Discussed weight loss at length    Continue other cardiac medications  Mediterranean Diet/Cardiovascular Exercise Program    Patient queried and all questions were answered.    F/u in 4 months to reassess symptoms - Goal weight 305 at next visit      Signed:    Mike Watson MD  1/22/2020 " 8:37 AM

## 2020-01-24 ENCOUNTER — TELEPHONE (OUTPATIENT)
Dept: FAMILY MEDICINE | Facility: CLINIC | Age: 35
End: 2020-01-24

## 2020-01-24 NOTE — TELEPHONE ENCOUNTER
----- Message from Mary Wren sent at 1/24/2020  1:30 PM CST -----  Contact: [facility]  Request a call concerning the needle neymar on the pt needles, no additional info given and can be reached at  [phone] ref# [id number]

## 2020-01-29 NOTE — PROGRESS NOTES
PLAN:      Problem List Items Addressed This Visit     Hypertension, essential (Chronic)     Continue Norvasc 10 mg, benazepril 20 mg, hydrochlorothiazide 25 mg, metoprolol 50 mg b.i.d..Discussed hypertension disease course, DASH-diet and exercise.  Discussed medication regimen importance of treating high blood pressure.  Patient advised of risk of untreated blood pressure.    ER precautions were given for symptoms of hypertensive urgency and emergency.           Relevant Medications    benazepril-hydrochlorthiazide (LOTENSIN HCT) 20-25 mg Tab    Mild episode of recurrent major depressive disorder (Chronic)     Continue follow-up with Psychiatry.  Continue Zoloft.Discussed anxiety condition course.  Discussed SSRI as first-line treatment for this condition.  Discussed risk of discontinuing this medication without tapering.  Patient was educated, advised of side effects, and all questions were answered.  Patient voiced understanding.  Patient will follow up routinely and notify us if having any side effects or worsening or persistent symptoms.  ER precautions were given.           Encounter for long-term (current) use of medications (Chronic)     Reviewed labs.  Updating labs on medication regimen.  Refills as prescribed. Complete history and physical was completed today.  Complete and thorough medication reconciliation was performed.  Discussed risks and benefits of medications.  Advised patient on orders and health maintenance.  We discussed old records and old labs if available.  Will request any records not available through epic.  Continue current medications listed on your summary sheet.           Relevant Medications    benazepril-hydrochlorthiazide (LOTENSIN HCT) 20-25 mg Tab    Hypotestosteronemia (Chronic)     January 2020:  Increase dosage to 200 mg every 14 days.  Labs ordered for three months check.    Check testosterone and hemoglobin.  Previous plan:  Associated with fatigue.  Chronic.  Starting  "testosterone 50 mg every 2 weeks.  Recheck level in 3 months.  Advised of risk and benefits of testosterone hormone replacement.         Paroxysmal atrial fibrillation - Primary (Chronic)     Continue Xarelto and metoprolol for this condition.  Continue follow-up with Cardiology.  ER precautions.         Relevant Orders    [facility] Patient Entered Blood Pressure    [facility] Patient Entered Pulse    BMI 45.0-49.9, adult (Chronic)     Uncontrolled.  Monitoring.  Patient was counseled on diet and lifestyle modification with exercise.         Relevant Orders    [facility] Patient Entered Weight        Future Appointments     Date Provider Specialty Appt Notes    2/10/2020  Cardiology Essential hypertension  Paroxysmal atrial fibrillation    2/10/2020 Graeme Garza MD Otolaryngology Chronic Sinus Congestion    2/10/2020 LING Huber, MINAL Optometry annual    2/10/2020 Randal Keenan MD Family Medicine 3 mo f/u HTN    4/30/2020 Randal Keenan MD Family Medicine 3 month lab fu    5/22/2020 Mike Watson MD Cardiology 4 month follow up           Medication List with Changes/Refills   Current Medications    AMLODIPINE (NORVASC) 10 MG TABLET    Take 1 tablet (10 mg total) by mouth once daily.    ATORVASTATIN (LIPITOR) 40 MG TABLET    Take 1 tablet (40 mg total) by mouth once daily.    AZELASTINE (ASTELIN) 137 MCG (0.1 %) NASAL SPRAY    INSTILL 1 SPRAY IN EACH NOSTRIL TWO TIMES A DAY AS DIRECTED    CETIRIZINE (ZYRTEC) 10 MG TABLET    Take 10 mg by mouth once daily.    FAMOTIDINE (PEPCID) 20 MG TABLET        FLUTICASONE PROPIONATE (FLONASE ALLERGY RELIEF) 50 MCG/ACTUATION NASAL SPRAY        METOPROLOL TARTRATE (LOPRESSOR) 100 MG TABLET    Take 0.5 tablets (50 mg total) by mouth 2 (two) times daily.    PANTOPRAZOLE (PROTONIX) 40 MG TABLET        SAFETY NEEDLES 23 GAUGE X 5/8" NDLE    1 each by Misc.(Non-Drug; Combo Route) route every 14 (fourteen) days.    SERTRALINE (ZOLOFT) 100 MG TABLET    Take 1 tablet (100 mg " "total) by mouth once daily.    SYRINGE WITH NEEDLE, SAFETY 3 ML 18 GAUGE X 1 1/2" SYRG    1 each by Misc.(Non-Drug; Combo Route) route every 14 (fourteen) days.    TESTOSTERONE CYPIONATE (DEPOTESTOTERONE CYPIONATE) 200 MG/ML INJECTION    Inject 1 mL (200 mg total) into the muscle every 14 (fourteen) days.    VITAMIN D2 50,000 UNIT CAPSULE    TAKE 1 CAPSULE BY MOUTH  EVERY 7 DAYS    XARELTO 20 MG TAB    20 mg once daily.    Changed and/or Refilled Medications    Modified Medication Previous Medication    BENAZEPRIL-HYDROCHLORTHIAZIDE (LOTENSIN HCT) 20-25 MG TAB benazepril-hydrochlorthiazide (LOTENSIN HCT) 20-25 mg Tab       Take 1 tablet by mouth once daily.    Take 1 tablet by mouth once daily.   Discontinued Medications    RIVAROXABAN (XARELTO) 10 MG TAB    Take 2 tablets (20 mg total) by mouth every evening.    TESTOSTERONE CYPIONATE (DEPO-TESTOSTERONE) 100 MG/ML INJECTION    Inject 2 mLs (200 mg total) into the muscle every 14 (fourteen) days.       Randal Keenan M.D.     ==========================================================================  Subjective:      Patient ID: Karlos Clark is a 34 y.o. male.  has a past medical history of Benign essential hypertension (2/20/2013), Depression (12/7/2011), Encounter for long-term (current) use of medications (7/23/2019), GERD (gastroesophageal reflux disease), Hypercholesterolemia (2/20/2013), Hypertension (12/7/2011), Hypotestosteronemia (8/15/2019), Obesity (12/7/2011), and Vitamin D deficiency (8/15/2019).     Chief Complaint: Hypertension (3 mo f/u)    Patient coming in for 3 month f/u for HTN.  Patient needs HIV screening to satisfy health maintenance.  Problem List Items Addressed This Visit     Hypertension, essential (Chronic)    Overview     Initial HPI:  Chronic.  Uncontrolled.  Patient on benazepril hydrochlorothiazide combination.  Reports compliance.  No side effects reported.  Denies any chest pain shortness of breath blurred vision.   BP " Readings from Last 3 Encounters:   07/23/19 (!) 138/90   09/05/18 (!) 140/88   07/23/18 (!) 138/99    ======================================================  Update August 15, 2019.  Blood pressure still uncontrolled.  Patient reports compliance with medications.  Patient reports increased stress after getting of work today.  =======================================================  October 2019:  Chronic.  Borderline uncontrolled.  Patient on [employer] medicine hypertension program.  Diastolic is starting to increase.  Patient reports compliance with benazepril 20 mg and hydrochlorothiazide 25 mg.  Amlodipine 5 mg.  =======================================================  JANUARY 2020:  Patient recently diagnosed with paroxysmal atrial fibrillation.  Patient is now on metoprolol in addition to his other blood pressure medications.  Patient feels well.  CHRONIC. STABLE. BP Reviewed.  Compliant with BP medications. No SE reported.   (-) CP, SOB, palpitations, dizziness, lightheadedness, HA, arm numbness, tingling or weakness, syncope.  Creatinine   Date Value Ref Range Status   01/20/2020 0.80 0.50 - 1.40 mg/dL Final     ======================================================         Current Assessment & Plan     Continue Norvasc 10 mg, benazepril 20 mg, hydrochlorothiazide 25 mg, metoprolol 50 mg b.i.d..Discussed hypertension disease course, DASH-diet and exercise.  Discussed medication regimen importance of treating high blood pressure.  Patient advised of risk of untreated blood pressure.    ER precautions were given for symptoms of hypertensive urgency and emergency.           Mild episode of recurrent major depressive disorder (Chronic)    Overview     Chronic.  Currently stable per patient.  Following with Psychiatry.  Currently taking Zoloft 100 mg.  Reports compliance.  No side effects reported.  Patient does have some comorbid anxiety.  Denies any SI or HI or hallucinations.         Current Assessment & Plan      Continue follow-up with Psychiatry.  Continue Zoloft.Discussed anxiety condition course.  Discussed SSRI as first-line treatment for this condition.  Discussed risk of discontinuing this medication without tapering.  Patient was educated, advised of side effects, and all questions were answered.  Patient voiced understanding.  Patient will follow up routinely and notify us if having any side effects or worsening or persistent symptoms.  ER precautions were given.           Encounter for long-term (current) use of medications (Chronic)    Overview     07/23/2019 Patient is on CHRONIC long-term drug therapy for managed conditions. See medication list. Reports compliance.  No side effects reported.  Routine lab work is being monitored.  Patient does  need refills today.   =======================================================  October 2019:Patient is on CHRONIC long-term drug therapy for managed conditions. See medication list. Reports compliance.  No side effects reported.  Routine lab work is being monitored.  Patient does need refills today.   =======================================================  JANUARY 2020:    CHRONIC. Stable. Compliant with medications for managed conditions. See medication list. No SE reported.   Routine lab analysis is being monitored. Refills were addressed.  Lab Results   Component Value Date    WBC 7.18 01/20/2020    HGB 16.6 01/20/2020    HCT 50.8 01/20/2020    MCV 85 01/20/2020     01/20/2020       Chemistry        Component Value Date/Time     (H) 01/20/2020 0910    K 4.2 01/20/2020 0910     01/20/2020 0910    CO2 28 01/20/2020 0910    BUN 11 01/20/2020 0910    CREATININE 0.80 01/20/2020 0910    GLU 87 01/20/2020 0910        Component Value Date/Time    CALCIUM 9.3 01/20/2020 0910    ALKPHOS 60 01/20/2020 0910    AST 38 01/20/2020 0910    ALT 25 01/20/2020 0910    BILITOT 0.7 01/20/2020 0910    ESTGFRAFRICA >60 01/20/2020 0910    EGFRNONAA >60 01/20/2020 0910         Lab Results   Component Value Date    TSH 1.520 01/20/2020    E5GOHZB 7.8 07/24/2019    FREET4 0.79 01/20/2020    T3FREE 3.6 07/24/2019   ======================================================         Current Assessment & Plan     Reviewed labs.  Updating labs on medication regimen.  Refills as prescribed. Complete history and physical was completed today.  Complete and thorough medication reconciliation was performed.  Discussed risks and benefits of medications.  Advised patient on orders and health maintenance.  We discussed old records and old labs if available.  Will request any records not available through epic.  Continue current medications listed on your summary sheet.           Hypotestosteronemia (Chronic)    Overview     Chronic.  Stable.  Patient reporting compliance with testosterone injections.  Lab Results   Component Value Date    TESTOSTERONE 221 (L) 07/24/2019    Patient is due for level check and hemoglobin check.  Reports symptoms improved.  =======================================================  JANUARY 2020:  Patient recent testosterone was lower than previous.  Increased frequency and dosage to 200 mg every 14 days.  ======================================================         Current Assessment & Plan     January 2020:  Increase dosage to 200 mg every 14 days.  Labs ordered for three months check.    Check testosterone and hemoglobin.  Previous plan:  Associated with fatigue.  Chronic.  Starting testosterone 50 mg every 2 weeks.  Recheck level in 3 months.  Advised of risk and benefits of testosterone hormone replacement.         Paroxysmal atrial fibrillation - Primary (Chronic)    Overview     New problem.  Recently diagnosed in the ER.  Patient following with Cardiology.  Patient was placed on Xarelto and metoprolol.  Patient has great rate controlled today.  Patient denies any symptoms chest pain palpitations or shortness of breath.  Patient states that cardiology wants him to  continue Xarelto until further discussion at follow-up office visit.         Current Assessment & Plan     Continue Xarelto and metoprolol for this condition.  Continue follow-up with Cardiology.  ER precautions.         BMI 45.0-49.9, adult (Chronic)    Overview     Chronic.  Uncontrolled.  Patient has not been compliant with diet and exercise.  Monitoring BMI.    BMI Readings from Last 5 Encounters:   01/30/20 45.00 kg/m²   01/22/20 45.20 kg/m²   01/20/20 43.65 kg/m²   10/31/19 44.08 kg/m²   09/19/19 44.71 kg/m²              Current Assessment & Plan     Uncontrolled.  Monitoring.  Patient was counseled on diet and lifestyle modification with exercise.                Past Medical History:  Past Medical History:   Diagnosis Date    Benign essential hypertension 2/20/2013    Depression 12/7/2011    Encounter for long-term (current) use of medications 7/23/2019 07/23/2019  Patient is on CHRONIC long-term drug therapy for managed conditions. See medication list. Reports compliance.  No side effects reported.  Routine lab work is being monitored.  Patient does  need refills today.   Lab Results Component Value Date  WBC 6.34 09/06/2018  HGB 14.1 09/06/2018  HCT 42.6 09/06/2018  MCV 86 09/06/2018   09/06/2018   CMP Sodium Date Value Ref Range Status     GERD (gastroesophageal reflux disease)     Hypercholesterolemia 2/20/2013    Chronic.  Uncontrolled.  Patient not on medications currently.  Patient has been on lovastatin in the past.  Not adherent to diet.  Lab Results Component Value Date  CHOL 213 (H) 07/24/2019  CHOL 162 09/06/2018  CHOL 181 04/20/2018  Lab Results Component Value Date  HDL 43 07/24/2019  HDL 33 (L) 09/06/2018  HDL 37 (L) 04/20/2018  Lab Results Component Value Date  LDLCALC 139 (H) 07/24/2019  LDLCAL    Hypertension 12/7/2011    Hypotestosteronemia 8/15/2019    Chronic .  Untreated. Lab Results Component Value Date  TESTOSTERONE 221 (L) 07/24/2019  Patient interested in starting  "replacement.    Obesity 12/7/2011    Vitamin D deficiency 8/15/2019    Chronic.  Untreated.  Patient taking vitamin-D supplementation.  Last vitamin-D level was low.     Past Surgical History:   Procedure Laterality Date    bladder      Pt was having bladder control problems.  Bladder was stretched approx 7 yrs old    SINUS SURGERY       Review of patient's allergies indicates:   Allergen Reactions    Escitalopram oxalate Other (See Comments) and Palpitations     Medication List with Changes/Refills   Current Medications    AMLODIPINE (NORVASC) 10 MG TABLET    Take 1 tablet (10 mg total) by mouth once daily.    ATORVASTATIN (LIPITOR) 40 MG TABLET    Take 1 tablet (40 mg total) by mouth once daily.    AZELASTINE (ASTELIN) 137 MCG (0.1 %) NASAL SPRAY    INSTILL 1 SPRAY IN EACH NOSTRIL TWO TIMES A DAY AS DIRECTED    CETIRIZINE (ZYRTEC) 10 MG TABLET    Take 10 mg by mouth once daily.    FAMOTIDINE (PEPCID) 20 MG TABLET        FLUTICASONE PROPIONATE (FLONASE ALLERGY RELIEF) 50 MCG/ACTUATION NASAL SPRAY        METOPROLOL TARTRATE (LOPRESSOR) 100 MG TABLET    Take 0.5 tablets (50 mg total) by mouth 2 (two) times daily.    PANTOPRAZOLE (PROTONIX) 40 MG TABLET        SAFETY NEEDLES 23 GAUGE X 5/8" NDLE    1 each by Misc.(Non-Drug; Combo Route) route every 14 (fourteen) days.    SERTRALINE (ZOLOFT) 100 MG TABLET    Take 1 tablet (100 mg total) by mouth once daily.    SYRINGE WITH NEEDLE, SAFETY 3 ML 18 GAUGE X 1 1/2" SYRG    1 each by Misc.(Non-Drug; Combo Route) route every 14 (fourteen) days.    TESTOSTERONE CYPIONATE (DEPOTESTOTERONE CYPIONATE) 200 MG/ML INJECTION    Inject 1 mL (200 mg total) into the muscle every 14 (fourteen) days.    VITAMIN D2 50,000 UNIT CAPSULE    TAKE 1 CAPSULE BY MOUTH  EVERY 7 DAYS    XARELTO 20 MG TAB    20 mg once daily.    Changed and/or Refilled Medications    Modified Medication Previous Medication    BENAZEPRIL-HYDROCHLORTHIAZIDE (LOTENSIN HCT) 20-25 MG TAB " benazepril-hydrochlorthiazide (LOTENSIN HCT) 20-25 mg Tab       Take 1 tablet by mouth once daily.    Take 1 tablet by mouth once daily.   Discontinued Medications    RIVAROXABAN (XARELTO) 10 MG TAB    Take 2 tablets (20 mg total) by mouth every evening.    TESTOSTERONE CYPIONATE (DEPO-TESTOSTERONE) 100 MG/ML INJECTION    Inject 2 mLs (200 mg total) into the muscle every 14 (fourteen) days.      Social History     Tobacco Use    Smoking status: Never Smoker    Smokeless tobacco: Never Used   Substance Use Topics    Alcohol use: Yes     Alcohol/week: 3.0 standard drinks     Types: 1 Shots of liquor, 2 Standard drinks or equivalent per week     Frequency: Monthly or less     Drinks per session: 1 or 2     Binge frequency: Less than monthly     Comment: 2 drinks per month      Family History   Problem Relation Age of Onset    Hypertension Mother     Arthritis Mother     Depression Mother     Hyperlipidemia Mother     COPD Father     Hypertension Father     Alcohol abuse Father     Arthritis Father     Cancer Father         Prostate cancer    Depression Father     Hearing loss Father     Heart disease Father     Hyperlipidemia Father     No Known Problems Sister     Arthritis Maternal Grandfather     Cancer Maternal Grandfather         Prostate cancer    Depression Maternal Grandfather     Diabetes Maternal Grandfather     Heart disease Maternal Grandfather     Hyperlipidemia Maternal Grandfather     Hypertension Maternal Grandfather     Kidney disease Maternal Grandfather     Stroke Maternal Grandfather     Arthritis Paternal Grandmother     Depression Paternal Grandmother     Heart disease Paternal Grandmother     Hyperlipidemia Paternal Grandmother     Hypertension Paternal Grandmother     COPD Paternal Grandfather         Prostate cancer    Depression Paternal Grandfather     Allergic rhinitis Neg Hx     Allergies Neg Hx     Angioedema Neg Hx     Asthma Neg Hx     Atopy Neg Hx   "   Eczema Neg Hx     Immunodeficiency Neg Hx     Rhinitis Neg Hx     Urticaria Neg Hx        I have reviewed the complete PMH, social history, surgical history, allergies and medications.  As well as family history.    Review of Systems   Constitutional: Negative for activity change and unexpected weight change.   HENT: Negative for hearing loss, rhinorrhea and trouble swallowing.    Eyes: Negative for discharge and visual disturbance.   Respiratory: Positive for chest tightness (Improved on medication). Negative for wheezing.    Cardiovascular: Positive for palpitations ( improved on medication). Negative for chest pain.   Gastrointestinal: Negative for blood in stool, constipation, diarrhea and vomiting.   Endocrine: Negative for polydipsia and polyuria.   Genitourinary: Negative for difficulty urinating, hematuria and urgency.   Musculoskeletal: Positive for arthralgias. Negative for joint swelling and neck pain.   Neurological: Negative for weakness and headaches.   Psychiatric/Behavioral: Negative for confusion and dysphoric mood.     Objective:   /88   Pulse 77   Temp 98.2 °F (36.8 °C) (Oral)   Ht 5' 10" (1.778 m)   Wt (!) 142.2 kg (313 lb 9.6 oz)   BMI 45.00 kg/m²   Physical Exam   Constitutional: He is oriented to person, place, and time. He appears well-developed and well-nourished. No distress.   HENT:   Head: Normocephalic and atraumatic.   Right Ear: External ear normal.   Left Ear: External ear normal.   Nose: Nose normal.   Mouth/Throat: Oropharynx is clear and moist. No oropharyngeal exudate.   Eyes: Pupils are equal, round, and reactive to light. EOM are normal.   Neck: Normal range of motion. Neck supple.   Cardiovascular: Normal rate, regular rhythm, normal heart sounds and intact distal pulses.   No murmur heard.  Pulmonary/Chest: Effort normal and breath sounds normal. No respiratory distress. He has no wheezes.   Abdominal: Soft. Bowel sounds are normal. He exhibits no distension. "   Obese abdomen   Musculoskeletal: Normal range of motion. He exhibits no edema.   Neurological: He is alert and oriented to person, place, and time. No cranial nerve deficit.   Skin: Skin is warm and dry. Capillary refill takes less than 2 seconds.   Psychiatric: He has a normal mood and affect. His behavior is normal.   Nursing note and vitals reviewed.      Assessment:     1. Paroxysmal atrial fibrillation    2. Hypotestosteronemia    3. Hypertension, essential    4. Encounter for long-term (current) use of medications    5. Mild episode of recurrent major depressive disorder    6. BMI 45.0-49.9, adult      MDM:   Moderate complexity  I have Reviewed and summarized old records.  I have performed thorough medication reconciliation today and discussed risk and benefits of each medication.  I have reviewed ER discharge, EKG, labs and discussed with patient.  All questions were answered.  I am requesting old records and will review them once they are available.    I have signed for the following orders AND/OR meds.  Orders Placed This Encounter   Procedures    [facility] Patient Entered Blood Pressure     Order Specific Question:   After how many days would you like to receive a notification of this patient's flowsheet entries?     Answer:   30    MyChart Patient Entered Pulse     Order Specific Question:   After how many days would you like to receive a notification of this patient's flowsheet entries?     Answer:   30    MyChart Patient Entered Weight     Please track your weight day so that I can review the daily control that you have.  This will be uploaded to your EPIC chart at our office so that we can care for you better.  Dr. Henry Mayorga     Order Specific Question:   After how many days would you like to receive a notification of this patient's flowsheet entries?     Answer:   30     Medications Ordered This Encounter   Medications    benazepril-hydrochlorthiazide (LOTENSIN HCT) 20-25 mg Tab     Sig: Take  1 tablet by mouth once daily.     Dispense:  90 tablet     Refill:  3        Follow up in about 3 months (around 4/30/2020), or if symptoms worsen or fail to improve, for LAB RESULTS, Med refills.    If no improvement in symptoms or symptoms worsen, advised to call/follow-up at clinic or go to ER. Patient voiced understanding and all questions/concerns were addressed.     DISCLAIMER: This note was compiled by using a speech recognition dictation system and therefore please be aware that typographical / speech recognition errors can and do occur.  Please contact me if you see any errors specifically.    Randal Keenan M.D.       Office: [phone]   [address] [place] [address]  FAX: [phone]

## 2020-01-30 ENCOUNTER — OFFICE VISIT (OUTPATIENT)
Dept: FAMILY MEDICINE | Facility: CLINIC | Age: 35
End: 2020-01-30
Payer: COMMERCIAL

## 2020-01-30 VITALS
DIASTOLIC BLOOD PRESSURE: 88 MMHG | HEIGHT: 70 IN | SYSTOLIC BLOOD PRESSURE: 124 MMHG | HEART RATE: 77 BPM | BODY MASS INDEX: 44.9 KG/M2 | WEIGHT: 313.63 LBS | TEMPERATURE: 98 F

## 2020-01-30 DIAGNOSIS — Z11.4 ENCOUNTER FOR SCREENING FOR HIV: ICD-10-CM

## 2020-01-30 DIAGNOSIS — Z79.899 ENCOUNTER FOR LONG-TERM (CURRENT) USE OF MEDICATIONS: ICD-10-CM

## 2020-01-30 DIAGNOSIS — I10 HYPERTENSION, ESSENTIAL: ICD-10-CM

## 2020-01-30 DIAGNOSIS — E34.9 HYPOTESTOSTERONEMIA: Chronic | ICD-10-CM

## 2020-01-30 DIAGNOSIS — F33.0 MILD EPISODE OF RECURRENT MAJOR DEPRESSIVE DISORDER: ICD-10-CM

## 2020-01-30 DIAGNOSIS — I48.0 PAROXYSMAL ATRIAL FIBRILLATION: Primary | ICD-10-CM

## 2020-01-30 PROCEDURE — 3074F SYST BP LT 130 MM HG: CPT | Mod: CPTII,S$GLB,, | Performed by: FAMILY MEDICINE

## 2020-01-30 PROCEDURE — 99999 PR PBB SHADOW E&M-EST. PATIENT-LVL III: ICD-10-PCS | Mod: PBBFAC,,, | Performed by: FAMILY MEDICINE

## 2020-01-30 PROCEDURE — 99214 PR OFFICE/OUTPT VISIT, EST, LEVL IV, 30-39 MIN: ICD-10-PCS | Mod: S$GLB,,, | Performed by: FAMILY MEDICINE

## 2020-01-30 PROCEDURE — 3008F BODY MASS INDEX DOCD: CPT | Mod: CPTII,S$GLB,, | Performed by: FAMILY MEDICINE

## 2020-01-30 PROCEDURE — 3079F DIAST BP 80-89 MM HG: CPT | Mod: CPTII,S$GLB,, | Performed by: FAMILY MEDICINE

## 2020-01-30 PROCEDURE — 99999 PR PBB SHADOW E&M-EST. PATIENT-LVL III: CPT | Mod: PBBFAC,,, | Performed by: FAMILY MEDICINE

## 2020-01-30 PROCEDURE — 3074F PR MOST RECENT SYSTOLIC BLOOD PRESSURE < 130 MM HG: ICD-10-PCS | Mod: CPTII,S$GLB,, | Performed by: FAMILY MEDICINE

## 2020-01-30 PROCEDURE — 3079F PR MOST RECENT DIASTOLIC BLOOD PRESSURE 80-89 MM HG: ICD-10-PCS | Mod: CPTII,S$GLB,, | Performed by: FAMILY MEDICINE

## 2020-01-30 PROCEDURE — 99214 OFFICE O/P EST MOD 30 MIN: CPT | Mod: S$GLB,,, | Performed by: FAMILY MEDICINE

## 2020-01-30 PROCEDURE — 3008F PR BODY MASS INDEX (BMI) DOCUMENTED: ICD-10-PCS | Mod: CPTII,S$GLB,, | Performed by: FAMILY MEDICINE

## 2020-01-30 RX ORDER — BENAZEPRIL/HYDROCHLOROTHIAZIDE 20 MG-25MG
1 TABLET ORAL DAILY
Qty: 90 TABLET | Refills: 3
Start: 2020-01-30 | End: 2020-07-14 | Stop reason: SDUPTHER

## 2020-01-30 RX ORDER — RIVAROXABAN 20 MG/1
20 TABLET, FILM COATED ORAL DAILY
COMMUNITY
Start: 2020-01-20 | End: 2020-02-11 | Stop reason: SDUPTHER

## 2020-01-30 NOTE — ASSESSMENT & PLAN NOTE
Continue Norvasc 10 mg, benazepril 20 mg, hydrochlorothiazide 25 mg, metoprolol 50 mg b.i.d..Discussed hypertension disease course, DASH-diet and exercise.  Discussed medication regimen importance of treating high blood pressure.  Patient advised of risk of untreated blood pressure.    ER precautions were given for symptoms of hypertensive urgency and emergency.

## 2020-01-30 NOTE — PATIENT INSTRUCTIONS
Follow up in about 3 months (around 4/30/2020), or if symptoms worsen or fail to improve, for LAB RESULTS, Med refills.     If no improvement in symptoms or symptoms worsen, please be advised to call MD, follow-up at clinic and/or go to ER if becomes severe.    Randal Keenan M.D.         We Offer TELEHEALTH & Same Day Appointments!   Book your Telehealth appointment with me through my nurse or   Clinic appointments on [facility]!    [address] [place] [address]    Office: [phone]     FAX: [phone]    Check out my Facebook Page and Follow Me at: CLICK HERE    Check out my website at [employer] by clicking on: CLICK HERE    To Schedule appointments online, go to [facility]: CLICK HERE     Location: [email or web address]

## 2020-01-30 NOTE — ASSESSMENT & PLAN NOTE
January 2020:  Increase dosage to 200 mg every 14 days.  Labs ordered for three months check.    Check testosterone and hemoglobin.  Previous plan:  Associated with fatigue.  Chronic.  Starting testosterone 50 mg every 2 weeks.  Recheck level in 3 months.  Advised of risk and benefits of testosterone hormone replacement.

## 2020-01-30 NOTE — ASSESSMENT & PLAN NOTE
Continue follow-up with Psychiatry.  Continue Zoloft.Discussed anxiety condition course.  Discussed SSRI as first-line treatment for this condition.  Discussed risk of discontinuing this medication without tapering.  Patient was educated, advised of side effects, and all questions were answered.  Patient voiced understanding.  Patient will follow up routinely and notify us if having any side effects or worsening or persistent symptoms.  ER precautions were given.

## 2020-01-30 NOTE — ASSESSMENT & PLAN NOTE
Continue Xarelto and metoprolol for this condition.  Continue follow-up with Cardiology.  ER precautions.

## 2020-01-30 NOTE — ASSESSMENT & PLAN NOTE
Uncontrolled.  Monitoring.  Patient was counseled on diet and lifestyle modification with exercise.

## 2020-02-10 ENCOUNTER — OFFICE VISIT (OUTPATIENT)
Dept: OPTOMETRY | Facility: CLINIC | Age: 35
End: 2020-02-10
Payer: COMMERCIAL

## 2020-02-10 ENCOUNTER — CLINICAL SUPPORT (OUTPATIENT)
Dept: CARDIOLOGY | Facility: CLINIC | Age: 35
End: 2020-02-10
Attending: INTERNAL MEDICINE
Payer: COMMERCIAL

## 2020-02-10 ENCOUNTER — PATIENT MESSAGE (OUTPATIENT)
Dept: CARDIOLOGY | Facility: CLINIC | Age: 35
End: 2020-02-10

## 2020-02-10 ENCOUNTER — OFFICE VISIT (OUTPATIENT)
Dept: OTOLARYNGOLOGY | Facility: CLINIC | Age: 35
End: 2020-02-10
Payer: COMMERCIAL

## 2020-02-10 VITALS
WEIGHT: 313 LBS | SYSTOLIC BLOOD PRESSURE: 134 MMHG | HEIGHT: 70 IN | DIASTOLIC BLOOD PRESSURE: 90 MMHG | HEART RATE: 68 BPM | BODY MASS INDEX: 44.81 KG/M2

## 2020-02-10 VITALS — WEIGHT: 315 LBS | BODY MASS INDEX: 45.33 KG/M2

## 2020-02-10 DIAGNOSIS — I48.0 PAROXYSMAL ATRIAL FIBRILLATION: ICD-10-CM

## 2020-02-10 DIAGNOSIS — H52.13 MYOPIA OF BOTH EYES WITH ASTIGMATISM: ICD-10-CM

## 2020-02-10 DIAGNOSIS — J32.0 CHRONIC MAXILLARY SINUSITIS: Primary | ICD-10-CM

## 2020-02-10 DIAGNOSIS — I10 ESSENTIAL HYPERTENSION: ICD-10-CM

## 2020-02-10 DIAGNOSIS — Z01.00 EXAMINATION OF EYES AND VISION: Primary | ICD-10-CM

## 2020-02-10 DIAGNOSIS — Z46.0 CONTACT LENS/GLASSES FITTING: ICD-10-CM

## 2020-02-10 DIAGNOSIS — G47.30 SLEEP DISORDER BREATHING: ICD-10-CM

## 2020-02-10 DIAGNOSIS — H52.203 MYOPIA OF BOTH EYES WITH ASTIGMATISM: ICD-10-CM

## 2020-02-10 DIAGNOSIS — Z46.0 CONTACT LENS/GLASSES FITTING: Primary | ICD-10-CM

## 2020-02-10 DIAGNOSIS — J31.0 RHINITIS, UNSPECIFIED TYPE: ICD-10-CM

## 2020-02-10 DIAGNOSIS — J34.3 NASAL TURBINATE HYPERTROPHY: ICD-10-CM

## 2020-02-10 LAB
ASCENDING AORTA: 2.76 CM
AV INDEX (PROSTH): 0.65
AV MEAN GRADIENT: 5 MMHG
AV PEAK GRADIENT: 8 MMHG
AV VALVE AREA: 1.97 CM2
AV VELOCITY RATIO: 0.7
BSA FOR ECHO PROCEDURE: 2.65 M2
CV ECHO LV RWT: 0.6 CM
DOP CALC AO PEAK VEL: 1.42 M/S
DOP CALC AO VTI: 34.12 CM
DOP CALC LVOT AREA: 3 CM2
DOP CALC LVOT DIAMETER: 1.97 CM
DOP CALC LVOT PEAK VEL: 1 M/S
DOP CALC LVOT STROKE VOLUME: 67.18 CM3
DOP CALCLVOT PEAK VEL VTI: 22.05 CM
E WAVE DECELERATION TIME: 166.23 MSEC
E/A RATIO: 1.9
E/E' RATIO: 9.58 M/S
ECHO LV POSTERIOR WALL: 1.23 CM (ref 0.6–1.1)
FRACTIONAL SHORTENING: 21 % (ref 28–44)
INTERVENTRICULAR SEPTUM: 1.34 CM (ref 0.6–1.1)
IVRT: 0.09 MSEC
LA MAJOR: 6.27 CM
LA MINOR: 5.96 CM
LA WIDTH: 3.58 CM
LEFT ATRIUM SIZE: 2.89 CM
LEFT ATRIUM VOLUME INDEX: 21.3 ML/M2
LEFT ATRIUM VOLUME: 53.74 CM3
LEFT INTERNAL DIMENSION IN SYSTOLE: 3.22 CM (ref 2.1–4)
LEFT VENTRICLE DIASTOLIC VOLUME INDEX: 29.04 ML/M2
LEFT VENTRICLE DIASTOLIC VOLUME: 73.33 ML
LEFT VENTRICLE MASS INDEX: 75 G/M2
LEFT VENTRICLE SYSTOLIC VOLUME INDEX: 16.5 ML/M2
LEFT VENTRICLE SYSTOLIC VOLUME: 41.7 ML
LEFT VENTRICULAR INTERNAL DIMENSION IN DIASTOLE: 4.08 CM (ref 3.5–6)
LEFT VENTRICULAR MASS: 188.77 G
LV LATERAL E/E' RATIO: 9.1 M/S
LV SEPTAL E/E' RATIO: 10.11 M/S
MV PEAK A VEL: 0.48 M/S
MV PEAK E VEL: 0.91 M/S
PISA TR MAX VEL: 2.86 M/S
PULM VEIN S/D RATIO: 1.29
PV PEAK D VEL: 0.51 M/S
PV PEAK S VEL: 0.66 M/S
RA MAJOR: 6.1 CM
RA PRESSURE: 3 MMHG
RA WIDTH: 3.23 CM
RIGHT VENTRICULAR END-DIASTOLIC DIMENSION: 3.33 CM
RV TISSUE DOPPLER FREE WALL SYSTOLIC VELOCITY 1 (APICAL 4 CHAMBER VIEW): 13.72 CM/S
SINUS: 3.25 CM
STJ: 2.62 CM
TDI LATERAL: 0.1 M/S
TDI SEPTAL: 0.09 M/S
TDI: 0.1 M/S
TR MAX PG: 33 MMHG
TRICUSPID ANNULAR PLANE SYSTOLIC EXCURSION: 2.56 CM
TV REST PULMONARY ARTERY PRESSURE: 36 MMHG

## 2020-02-10 PROCEDURE — 3074F SYST BP LT 130 MM HG: CPT | Mod: CPTII,S$GLB,, | Performed by: OTOLARYNGOLOGY

## 2020-02-10 PROCEDURE — 93308 TTE F-UP OR LMTD: CPT | Mod: S$GLB,,, | Performed by: INTERNAL MEDICINE

## 2020-02-10 PROCEDURE — 92310 CONTACT LENS FITTING OU: CPT | Mod: CSM,,, | Performed by: OPTOMETRIST

## 2020-02-10 PROCEDURE — 99499 NO LOS: ICD-10-PCS | Mod: S$GLB,,, | Performed by: OPTOMETRIST

## 2020-02-10 PROCEDURE — 93321 DOPPLER ECHO F-UP/LMTD STD: CPT | Mod: S$GLB,,, | Performed by: INTERNAL MEDICINE

## 2020-02-10 PROCEDURE — 3074F PR MOST RECENT SYSTOLIC BLOOD PRESSURE < 130 MM HG: ICD-10-PCS | Mod: CPTII,S$GLB,, | Performed by: OTOLARYNGOLOGY

## 2020-02-10 PROCEDURE — 92015 DETERMINE REFRACTIVE STATE: CPT | Mod: S$GLB,,, | Performed by: OPTOMETRIST

## 2020-02-10 PROCEDURE — 99214 OFFICE O/P EST MOD 30 MIN: CPT | Mod: S$GLB,,, | Performed by: OTOLARYNGOLOGY

## 2020-02-10 PROCEDURE — 93325 PR DOPPLER COLOR FLOW VELOCITY MAP: ICD-10-PCS | Mod: S$GLB,,, | Performed by: INTERNAL MEDICINE

## 2020-02-10 PROCEDURE — 99999 PR PBB SHADOW E&M-EST. PATIENT-LVL III: CPT | Mod: PBBFAC,,, | Performed by: OTOLARYNGOLOGY

## 2020-02-10 PROCEDURE — 99214 PR OFFICE/OUTPT VISIT, EST, LEVL IV, 30-39 MIN: ICD-10-PCS | Mod: S$GLB,,, | Performed by: OTOLARYNGOLOGY

## 2020-02-10 PROCEDURE — 99999 PR PBB SHADOW E&M-EST. PATIENT-LVL III: ICD-10-PCS | Mod: PBBFAC,,, | Performed by: OTOLARYNGOLOGY

## 2020-02-10 PROCEDURE — 92015 PR REFRACTION: ICD-10-PCS | Mod: S$GLB,,, | Performed by: OPTOMETRIST

## 2020-02-10 PROCEDURE — 92014 PR EYE EXAM, EST PATIENT,COMPREHESV: ICD-10-PCS | Mod: S$GLB,,, | Performed by: OPTOMETRIST

## 2020-02-10 PROCEDURE — 3008F PR BODY MASS INDEX (BMI) DOCUMENTED: ICD-10-PCS | Mod: CPTII,S$GLB,, | Performed by: OTOLARYNGOLOGY

## 2020-02-10 PROCEDURE — 92014 COMPRE OPH EXAM EST PT 1/>: CPT | Mod: S$GLB,,, | Performed by: OPTOMETRIST

## 2020-02-10 PROCEDURE — 99999 PR PBB SHADOW E&M-EST. PATIENT-LVL II: ICD-10-PCS | Mod: PBBFAC,,,

## 2020-02-10 PROCEDURE — 3080F PR MOST RECENT DIASTOLIC BLOOD PRESSURE >= 90 MM HG: ICD-10-PCS | Mod: CPTII,S$GLB,, | Performed by: OTOLARYNGOLOGY

## 2020-02-10 PROCEDURE — 93325 DOPPLER ECHO COLOR FLOW MAPG: CPT | Mod: S$GLB,,, | Performed by: INTERNAL MEDICINE

## 2020-02-10 PROCEDURE — 99999 PR PBB SHADOW E&M-EST. PATIENT-LVL II: ICD-10-PCS | Mod: PBBFAC,,, | Performed by: OPTOMETRIST

## 2020-02-10 PROCEDURE — 99999 PR PBB SHADOW E&M-EST. PATIENT-LVL II: CPT | Mod: PBBFAC,,, | Performed by: OPTOMETRIST

## 2020-02-10 PROCEDURE — 3080F DIAST BP >= 90 MM HG: CPT | Mod: CPTII,S$GLB,, | Performed by: OTOLARYNGOLOGY

## 2020-02-10 PROCEDURE — 3008F BODY MASS INDEX DOCD: CPT | Mod: CPTII,S$GLB,, | Performed by: OTOLARYNGOLOGY

## 2020-02-10 PROCEDURE — 93308 ECHO (CUPID ONLY): ICD-10-PCS | Mod: S$GLB,,, | Performed by: INTERNAL MEDICINE

## 2020-02-10 PROCEDURE — 93321 PR DOPPLER ECHO HEART,LIMITED,F/U: ICD-10-PCS | Mod: S$GLB,,, | Performed by: INTERNAL MEDICINE

## 2020-02-10 PROCEDURE — 99999 PR PBB SHADOW E&M-EST. PATIENT-LVL II: CPT | Mod: PBBFAC,,,

## 2020-02-10 PROCEDURE — 99499 UNLISTED E&M SERVICE: CPT | Mod: S$GLB,,, | Performed by: OPTOMETRIST

## 2020-02-10 PROCEDURE — 92310 PR CONTACT LENS FITTING (NO CHANGE): ICD-10-PCS | Mod: CSM,,, | Performed by: OPTOMETRIST

## 2020-02-10 NOTE — PATIENT INSTRUCTIONS
Switch from Flonase to Nasacort and go down to once daily except for flairs  Continue additional medications at current doses  Touch base with me after your sleep study

## 2020-02-10 NOTE — PROGRESS NOTES
Subjective:       Patient ID: Karlos Clark is a 34 y.o. male.    Chief Complaint: Sinus Problem (chronic sinus congestion)    Karlos is here for follow-up of chronic nasal issues / rhinitis. He is using Nasal saline irr / Flonase and Astelin BID. He gets improvement in congestion with Rastafarian use. Will have some facial pressure over the ethmoid region. Sense of smell little blunted.     Has occ aural fullness improved with meds.   No recent sinus infections.   Has upcoming PSG 2/28. Has been having reduced sleep quality and unsure if related.  Main issue recently has been lack of sleep, nasal congestion at night.  Will worsen without meds    Review of Systems   Constitutional: Negative for activity change and appetite change.   Respiratory: Negative for difficulty breathing and wheezing   Cardiovascular: Negative for chest pain.      Objective:        Constitutional:   Vital signs are normal. He appears well-developed and well-nourished.     Head:  Normocephalic and atraumatic.     Ears:  Hearing normal to normal and whispered voice; external ear normal without scars, lesions, or masses; ear canal, tympanic membrane, and middle ear normal..     Nose:  Nose normal including turbinates, nasal mucosa, sinuses and nasal septum. Turbinate hypertrophy (sev R ITH, mild ITH).      Mouth/Throat  Oropharynx clear and moist without lesions or asymmetry.     Neck:  Neck normal without thyromegaly masses, asymmetry, normal tracheal structure, crepitus, and tenderness.         Tests / Results:  Re-reviewed CT Sinus    Assessment:       1. Chronic maxillary sinusitis    2. Nasal turbinate hypertrophy    3. Rhinitis, unspecified type    4. Sleep disorder breathing          Plan:         Discussed options. Continue medication use for now but decr INS to daily (switch to nasacort) with Bid for flairs.   Consider ITR + bilat max. Discussed this may improve sleeping issues, but I also suspect EKATERINA. Rec getting results of PSG end of  month to stratify degree of sleep issues. If mod-sev EKATERINA, surgery unlikely to provide good relief of sleep issues.  Phone review pending results of PSG

## 2020-02-10 NOTE — PATIENT INSTRUCTIONS
"DRY EYES:  Use Over The Counter artificial tears as needed for dry eye symptoms.  Some common brands include:  Systane, Optive, and Refresh.  These drops can be used as frequently as desired, but may be most helpful use during long periods of concentrated work.  For example, reading / working at the computer.  Avoid drops that "get redness out", as these contain medication that may further irritate the eyes.    ALLERGY EYES / SYMPTOMS:    Over the counter medications include--Zaditor and Alaway  Use as directed 1-2 drops daily for symptoms of itching / watering eyes.  These drops will not help for dry eye or exposure symptoms.    REDNESS RELIEF:  Lumify---is a good redness reliever that will not cause chronic irritation.    DAILY WEAR CONTACT LENSES  Continue with Daily Wear of contact lenses, up to all waking hours.  Continue with approved contact lens disinfection / rewetting drops as discussed.  Dispose of lenses monthly.  Stop wearing your lenses and call our office if redness, blurred vision, or pain persists more than 12 hours.  We recommend an annual eye exam for contact lens patients.    "

## 2020-02-10 NOTE — PROGRESS NOTES
HPI     Routine eye exam--dle-5/2/18    Denies any blurred vision. Needing updated glasses rx. Interested in   trying contacts again. Denies any eye pain.     Last edited by Stephany Garcia on 2/10/2020  9:27 AM. (History)        ROS     Positive for: Eyes    Negative for: Constitutional, Gastrointestinal, Neurological, Skin,   Genitourinary, Musculoskeletal, HENT, Endocrine, Cardiovascular,   Respiratory, Psychiatric, Allergic/Imm, Heme/Lymph    Last edited by LING Huber, OD on 2/10/2020  9:44 AM. (History)        Assessment /Plan     For exam results, see Encounter Report.    Examination of eyes and vision    Myopia of both eyes with astigmatism    Contact lens/glasses fitting      1. Ocular health exam OU  2. Updated specs rx, gave copy  3. Updated w/ new trials biofinity and order oasys toric    DAILY WEAR CONTACT LENSES  Continue with Daily Wear of contact lenses, up to all waking hours.  Continue with approved contact lens disinfection / rewetting drops as discussed.  Dispose of lenses monthly.  Stop wearing your lenses and call our office if redness, blurred vision, or pain persists more than 12 hours.  We recommend an annual eye exam for contact lens patients.    Discussed and educated patient on current findings /plan.  RTC 1 year, prn if any changes / issues

## 2020-02-11 RX ORDER — RIVAROXABAN 20 MG/1
20 TABLET, FILM COATED ORAL DAILY
Qty: 30 TABLET | Refills: 11 | Status: SHIPPED | OUTPATIENT
Start: 2020-02-11 | End: 2020-11-24

## 2020-02-11 RX ORDER — METOPROLOL TARTRATE 100 MG/1
50 TABLET ORAL 2 TIMES DAILY
Qty: 90 TABLET | Refills: 3 | Status: SHIPPED | OUTPATIENT
Start: 2020-02-11 | End: 2020-12-07

## 2020-02-18 RX ORDER — PANTOPRAZOLE SODIUM 40 MG/1
TABLET, DELAYED RELEASE ORAL
Qty: 90 TABLET | Refills: 3 | Status: SHIPPED | OUTPATIENT
Start: 2020-02-18 | End: 2020-12-07

## 2020-02-18 NOTE — PROGRESS NOTES
Refill Routing Note     Medication(s) are not appropriate for processing by [facility]:    Non Participating Provider in Refill Center     Appointments  past 12m or future 3m with PCP    Date Provider   Last Visit   1/30/2020 Randal Keenan MD   Next Visit   4/30/2020 Randal Keenan MD           Automatic Epic Protocol Generated Data:    Requested Prescriptions   Pending Prescriptions Disp Refills    pantoprazole (PROTONIX) 40 MG tablet [Pharmacy Med Name: PANTOPRAZOLE SOD 40MG EC TABLET] 90 tablet      Sig: TAKE 1 TABLET BY MOUTH ONCE DAILY       Gastroenterology: Proton Pump Inhibitors 2 Passed - 2/15/2020  4:18 AM        Passed - Patient is at least 18 years old        Passed - Osteoporosis is not on problem list        Passed - An appropriate indication is on the problem list        Passed - Office visit in past 6 months or future 90 days.     Recent Outpatient Visits            1 week ago Contact lens/glasses fitting    [place] LING Huber, OD    1 week ago Examination of eyes and vision    [facility] LING Huber, OD    1 week ago Chronic maxillary sinusitis    [place] - ENT Graeme Garza MD    2 weeks ago Paroxysmal atrial fibrillation    [facility] Randal Keenan MD    3 weeks ago Paroxysmal atrial fibrillation    [facility] Cardiology Mike Watson MD          Future Appointments              [facility] 1 week [facility] [place] [facility]    In 2 months Randal Keenan MD [facility], [place]    In 3 months Mike Watson MD [facility], [place]                   Note composed:3:32 PM 02/18/2020

## 2020-02-19 ENCOUNTER — PATIENT MESSAGE (OUTPATIENT)
Dept: ADMINISTRATIVE | Facility: OTHER | Age: 35
End: 2020-02-19

## 2020-02-24 ENCOUNTER — PATIENT MESSAGE (OUTPATIENT)
Dept: OPTOMETRY | Facility: CLINIC | Age: 35
End: 2020-02-24

## 2020-02-27 ENCOUNTER — TELEPHONE (OUTPATIENT)
Dept: DERMATOLOGY | Facility: CLINIC | Age: 35
End: 2020-02-27

## 2020-02-27 NOTE — TELEPHONE ENCOUNTER
Pt contacted office, pt was inquring for an upcoming appt. Pt offered several appts. Pt declined. Pt states he will contact office to set up appt upon getting schedule for work.

## 2020-03-10 ENCOUNTER — PATIENT OUTREACH (OUTPATIENT)
Dept: OTHER | Facility: OTHER | Age: 35
End: 2020-03-10

## 2020-04-21 DIAGNOSIS — Z01.84 ANTIBODY RESPONSE EXAMINATION: ICD-10-CM

## 2020-04-30 ENCOUNTER — LAB VISIT (OUTPATIENT)
Dept: LAB | Facility: HOSPITAL | Age: 35
End: 2020-04-30
Attending: FAMILY MEDICINE
Payer: COMMERCIAL

## 2020-04-30 ENCOUNTER — OFFICE VISIT (OUTPATIENT)
Dept: FAMILY MEDICINE | Facility: CLINIC | Age: 35
End: 2020-04-30
Payer: COMMERCIAL

## 2020-04-30 VITALS — SYSTOLIC BLOOD PRESSURE: 120 MMHG | DIASTOLIC BLOOD PRESSURE: 80 MMHG

## 2020-04-30 DIAGNOSIS — Z01.84 ANTIBODY RESPONSE EXAMINATION: ICD-10-CM

## 2020-04-30 DIAGNOSIS — Z11.4 ENCOUNTER FOR SCREENING FOR HIV: ICD-10-CM

## 2020-04-30 DIAGNOSIS — Z79.899 ENCOUNTER FOR LONG-TERM (CURRENT) USE OF MEDICATIONS: Primary | ICD-10-CM

## 2020-04-30 DIAGNOSIS — Z09 FOLLOW UP: ICD-10-CM

## 2020-04-30 DIAGNOSIS — E34.9 HYPOTESTOSTERONEMIA: Chronic | ICD-10-CM

## 2020-04-30 DIAGNOSIS — E78.00 HYPERCHOLESTEROLEMIA: ICD-10-CM

## 2020-04-30 DIAGNOSIS — E34.9 HYPOTESTOSTERONEMIA: ICD-10-CM

## 2020-04-30 LAB
ALBUMIN SERPL BCP-MCNC: 4 G/DL (ref 3.5–5.2)
ALP SERPL-CCNC: 67 U/L (ref 55–135)
ALT SERPL W/O P-5'-P-CCNC: 26 U/L (ref 10–44)
AST SERPL-CCNC: 22 U/L (ref 10–40)
BASOPHILS # BLD AUTO: 0.06 K/UL (ref 0–0.2)
BASOPHILS NFR BLD: 1 % (ref 0–1.9)
BILIRUB DIRECT SERPL-MCNC: 0.3 MG/DL (ref 0.1–0.3)
BILIRUB SERPL-MCNC: 0.6 MG/DL (ref 0.1–1)
CHOLEST SERPL-MCNC: 133 MG/DL (ref 120–199)
CHOLEST/HDLC SERPL: 3.9 {RATIO} (ref 2–5)
DIFFERENTIAL METHOD: NORMAL
EOSINOPHIL # BLD AUTO: 0.2 K/UL (ref 0–0.5)
EOSINOPHIL NFR BLD: 3.8 % (ref 0–8)
ERYTHROCYTE [DISTWIDTH] IN BLOOD BY AUTOMATED COUNT: 12.7 % (ref 11.5–14.5)
HCT VFR BLD AUTO: 44.2 % (ref 40–54)
HDLC SERPL-MCNC: 34 MG/DL (ref 40–75)
HDLC SERPL: 25.6 % (ref 20–50)
HGB BLD-MCNC: 14.2 G/DL (ref 14–18)
IMM GRANULOCYTES # BLD AUTO: 0.02 K/UL (ref 0–0.04)
IMM GRANULOCYTES NFR BLD AUTO: 0.3 % (ref 0–0.5)
LDLC SERPL CALC-MCNC: 77.8 MG/DL (ref 63–159)
LYMPHOCYTES # BLD AUTO: 1.5 K/UL (ref 1–4.8)
LYMPHOCYTES NFR BLD: 24.8 % (ref 18–48)
MCH RBC QN AUTO: 28.1 PG (ref 27–31)
MCHC RBC AUTO-ENTMCNC: 32.1 G/DL (ref 32–36)
MCV RBC AUTO: 88 FL (ref 82–98)
MONOCYTES # BLD AUTO: 0.4 K/UL (ref 0.3–1)
MONOCYTES NFR BLD: 7.2 % (ref 4–15)
NEUTROPHILS # BLD AUTO: 3.7 K/UL (ref 1.8–7.7)
NEUTROPHILS NFR BLD: 62.9 % (ref 38–73)
NONHDLC SERPL-MCNC: 99 MG/DL
NRBC BLD-RTO: 0 /100 WBC
PLATELET # BLD AUTO: 218 K/UL (ref 150–350)
PMV BLD AUTO: 11 FL (ref 9.2–12.9)
PROT SERPL-MCNC: 7.1 G/DL (ref 6–8.4)
RBC # BLD AUTO: 5.05 M/UL (ref 4.6–6.2)
SARS-COV-2 IGG SERPLBLD QL IA.RAPID: NEGATIVE
TESTOST SERPL-MCNC: 304 NG/DL (ref 304–1227)
TRIGL SERPL-MCNC: 106 MG/DL (ref 30–150)
WBC # BLD AUTO: 5.84 K/UL (ref 3.9–12.7)

## 2020-04-30 PROCEDURE — 86703 HIV-1/HIV-2 1 RESULT ANTBDY: CPT

## 2020-04-30 PROCEDURE — 3079F DIAST BP 80-89 MM HG: CPT | Mod: CPTII,,, | Performed by: FAMILY MEDICINE

## 2020-04-30 PROCEDURE — 3074F SYST BP LT 130 MM HG: CPT | Mod: CPTII,,, | Performed by: FAMILY MEDICINE

## 2020-04-30 PROCEDURE — 3079F PR MOST RECENT DIASTOLIC BLOOD PRESSURE 80-89 MM HG: ICD-10-PCS | Mod: CPTII,,, | Performed by: FAMILY MEDICINE

## 2020-04-30 PROCEDURE — 99213 PR OFFICE/OUTPT VISIT, EST, LEVL III, 20-29 MIN: ICD-10-PCS | Mod: 95,,, | Performed by: FAMILY MEDICINE

## 2020-04-30 PROCEDURE — 86769 SARS-COV-2 COVID-19 ANTIBODY: CPT

## 2020-04-30 PROCEDURE — 80061 LIPID PANEL: CPT

## 2020-04-30 PROCEDURE — 85025 COMPLETE CBC W/AUTO DIFF WBC: CPT

## 2020-04-30 PROCEDURE — 3074F PR MOST RECENT SYSTOLIC BLOOD PRESSURE < 130 MM HG: ICD-10-PCS | Mod: CPTII,,, | Performed by: FAMILY MEDICINE

## 2020-04-30 PROCEDURE — 84403 ASSAY OF TOTAL TESTOSTERONE: CPT

## 2020-04-30 PROCEDURE — 80076 HEPATIC FUNCTION PANEL: CPT

## 2020-04-30 PROCEDURE — 36415 COLL VENOUS BLD VENIPUNCTURE: CPT | Mod: PO

## 2020-04-30 PROCEDURE — 99213 OFFICE O/P EST LOW 20 MIN: CPT | Mod: 95,,, | Performed by: FAMILY MEDICINE

## 2020-04-30 NOTE — PROGRESS NOTES
Primary Care Telemedicine Note    The patient location is:  Patient Home - [place]  The chief complaint leading to consultation is:   Chief Complaint   Patient presents with    Follow-up      Total time spent with patient:  11 min    Visit type: Virtual visit with synchronous audio only and video  Each patient to whom he or she provides medical services by telemedicine is:  (1) informed of the relationship between the physician and patient and the respective role of any other health care provider with respect to management of the patient; and (2) notified that he or she may decline to receive medical services by telemedicine and may withdraw from such care at any time.  =================================================================  PLAN:      Problem List Items Addressed This Visit     Encounter for long-term (current) use of medications - Primary (Chronic)     Update labs.  Continue current regimen.         Hypotestosteronemia (Chronic)     April 2020:  Check labs.  Adjust dose accordingly.January 2020:  Increase dosage to 200 mg every 14 days.  Labs ordered for three months check.    Check testosterone and hemoglobin.  Previous plan:  Associated with fatigue.  Chronic.  Starting testosterone 50 mg every 2 weeks.  Recheck level in 3 months.  Advised of risk and benefits of testosterone hormone replacement.             Future Appointments     Date Provider Specialty Appt Notes    5/22/2020 Mike Watson MD Cardiology 4 month follow up    8/3/2020 Randal Keenan MD Family Medicine 3 mo f/u           Medication List with Changes/Refills   Current Medications    AMLODIPINE (NORVASC) 10 MG TABLET    Take 1 tablet (10 mg total) by mouth once daily.    ATORVASTATIN (LIPITOR) 40 MG TABLET    Take 1 tablet (40 mg total) by mouth once daily.    AZELASTINE (ASTELIN) 137 MCG (0.1 %) NASAL SPRAY    INSTILL 1 SPRAY IN EACH NOSTRIL TWO TIMES A DAY AS DIRECTED    BENAZEPRIL-HYDROCHLORTHIAZIDE (LOTENSIN HCT) 20-25  "MG TAB    Take 1 tablet by mouth once daily.    CETIRIZINE (ZYRTEC) 10 MG TABLET    Take 10 mg by mouth once daily.    FAMOTIDINE (PEPCID) 20 MG TABLET        FLUTICASONE PROPIONATE (FLONASE ALLERGY RELIEF) 50 MCG/ACTUATION NASAL SPRAY        METOPROLOL TARTRATE (LOPRESSOR) 100 MG TABLET    Take 0.5 tablets (50 mg total) by mouth 2 (two) times daily.    PANTOPRAZOLE (PROTONIX) 40 MG TABLET    TAKE 1 TABLET BY MOUTH ONCE DAILY    SAFETY NEEDLES 23 GAUGE X 5/8" NDLE    1 each by Misc.(Non-Drug; Combo Route) route every 14 (fourteen) days.    SERTRALINE (ZOLOFT) 100 MG TABLET    Take 1 tablet (100 mg total) by mouth once daily.    SYRINGE WITH NEEDLE, SAFETY 3 ML 18 GAUGE X 1 1/2" SYRG    1 each by Misc.(Non-Drug; Combo Route) route every 14 (fourteen) days.    TESTOSTERONE CYPIONATE (DEPOTESTOTERONE CYPIONATE) 200 MG/ML INJECTION    Inject 1 mL (200 mg total) into the muscle every 14 (fourteen) days.    VITAMIN D2 50,000 UNIT CAPSULE    TAKE 1 CAPSULE BY MOUTH  EVERY 7 DAYS    XARELTO 20 MG TAB    Take 1 tablet (20 mg total) by mouth once daily.       Randal Keenan M.D.     ==========================================================================  Subjective:      Patient ID: Karlos Clark is a 34 y.o. male.  has a past medical history of Benign essential hypertension (2/20/2013), Depression (12/7/2011), Encounter for long-term (current) use of medications (7/23/2019), GERD (gastroesophageal reflux disease), Hypercholesterolemia (2/20/2013), Hypertension (12/7/2011), Hypotestosteronemia (8/15/2019), Obesity (12/7/2011), and Vitamin D deficiency (8/15/2019).     Chief Complaint: Follow-up      Problem List Items Addressed This Visit     Encounter for long-term (current) use of medications - Primary (Chronic)    Overview     07/23/2019 Patient is on CHRONIC long-term drug therapy for managed conditions. See medication list. Reports compliance.  No side effects reported.  Routine lab work is being monitored.  " Patient does  need refills today.   =======================================================  October 2019:Patient is on CHRONIC long-term drug therapy for managed conditions. See medication list. Reports compliance.  No side effects reported.  Routine lab work is being monitored.  Patient does need refills today.   =======================================================  JANUARY 2020:  CHRONIC. Stable. Compliant with medications for managed conditions. See medication list. No SE reported.   Routine lab analysis is being monitored. Refills were addressed.  =======================================================  APRIL 2020:  Medications and labs reviewed with the patient.  Patient reports blood pressure has been averaging 120/80 on current medication regimen.  CHRONIC. Stable. Compliant with medications for managed conditions. See medication list. No SE reported.   Routine lab analysis is being monitored. Refills were addressed.  Lab Results   Component Value Date    WBC 7.18 01/20/2020    HGB 16.6 01/20/2020    HCT 50.8 01/20/2020    MCV 85 01/20/2020     01/20/2020         Chemistry        Component Value Date/Time     (H) 01/20/2020 0910    K 4.2 01/20/2020 0910     01/20/2020 0910    CO2 28 01/20/2020 0910    BUN 11 01/20/2020 0910    CREATININE 0.80 01/20/2020 0910    GLU 87 01/20/2020 0910        Component Value Date/Time    CALCIUM 9.3 01/20/2020 0910    ALKPHOS 60 01/20/2020 0910    AST 38 01/20/2020 0910    ALT 25 01/20/2020 0910    BILITOT 0.7 01/20/2020 0910    ESTGFRAFRICA >60 01/20/2020 0910    EGFRNONAA >60 01/20/2020 0910          Lab Results   Component Value Date    TSH 1.520 01/20/2020    S3YOXQZ 7.8 07/24/2019    FREET4 0.79 01/20/2020    T3FREE 3.6 07/24/2019       ======================================================         Current Assessment & Plan     Update labs.  Continue current regimen.         Hypotestosteronemia (Chronic)    Overview     Chronic.  Stable.  Patient  reporting compliance with testosterone injections.  Lab Results   Component Value Date    TESTOSTERONE 221 (L) 07/24/2019    Patient is due for level check and hemoglobin check.  Reports symptoms improved.  =======================================================  JANUARY 2020:  Patient recent testosterone was lower than previous.  Increased frequency and dosage to 200 mg every 14 days.  ======================================================  =======================================================  APRIL 2020:  Patient doing well on current injection therapy other than wearing off after few days.  Patient's symptoms are fatigue and decreased libido.  Lab Results   Component Value Date    WBC 7.18 01/20/2020    HGB 16.6 01/20/2020    HCT 50.8 01/20/2020    MCV 85 01/20/2020     01/20/2020       Testosterone (ng/dL)   Date Value   07/24/2019 221 (L)     Testosterone, Total (ng/dL)   Date Value   01/10/2020 146 (L)         Due for labs.  Patient using 200 mg every 14 days.  ======================================================         Current Assessment & Plan     April 2020:  Check labs.  Adjust dose accordingly.January 2020:  Increase dosage to 200 mg every 14 days.  Labs ordered for three months check.    Check testosterone and hemoglobin.  Previous plan:  Associated with fatigue.  Chronic.  Starting testosterone 50 mg every 2 weeks.  Recheck level in 3 months.  Advised of risk and benefits of testosterone hormone replacement.                Past Medical History:  Past Medical History:   Diagnosis Date    Benign essential hypertension 2/20/2013    Depression 12/7/2011    Encounter for long-term (current) use of medications 7/23/2019 07/23/2019  Patient is on CHRONIC long-term drug therapy for managed conditions. See medication list. Reports compliance.  No side effects reported.  Routine lab work is being monitored.  Patient does  need refills today.   Lab Results Component Value Date   WBC 6.34 09/06/2018  HGB 14.1 09/06/2018  HCT 42.6 09/06/2018  MCV 86 09/06/2018   09/06/2018   CMP Sodium Date Value Ref Range Status     GERD (gastroesophageal reflux disease)     Hypercholesterolemia 2/20/2013    Chronic.  Uncontrolled.  Patient not on medications currently.  Patient has been on lovastatin in the past.  Not adherent to diet.  Lab Results Component Value Date  CHOL 213 (H) 07/24/2019  CHOL 162 09/06/2018  CHOL 181 04/20/2018  Lab Results Component Value Date  HDL 43 07/24/2019  HDL 33 (L) 09/06/2018  HDL 37 (L) 04/20/2018  Lab Results Component Value Date  LDLCALC 139 (H) 07/24/2019  LDLCAL    Hypertension 12/7/2011    Hypotestosteronemia 8/15/2019    Chronic .  Untreated. Lab Results Component Value Date  TESTOSTERONE 221 (L) 07/24/2019  Patient interested in starting replacement.    Obesity 12/7/2011    Vitamin D deficiency 8/15/2019    Chronic.  Untreated.  Patient taking vitamin-D supplementation.  Last vitamin-D level was low.     Past Surgical History:   Procedure Laterality Date    bladder      Pt was having bladder control problems.  Bladder was stretched approx 7 yrs old    SINUS SURGERY       Review of patient's allergies indicates:   Allergen Reactions    Escitalopram oxalate Other (See Comments) and Palpitations     Medication List with Changes/Refills   Current Medications    AMLODIPINE (NORVASC) 10 MG TABLET    Take 1 tablet (10 mg total) by mouth once daily.    ATORVASTATIN (LIPITOR) 40 MG TABLET    Take 1 tablet (40 mg total) by mouth once daily.    AZELASTINE (ASTELIN) 137 MCG (0.1 %) NASAL SPRAY    INSTILL 1 SPRAY IN EACH NOSTRIL TWO TIMES A DAY AS DIRECTED    BENAZEPRIL-HYDROCHLORTHIAZIDE (LOTENSIN HCT) 20-25 MG TAB    Take 1 tablet by mouth once daily.    CETIRIZINE (ZYRTEC) 10 MG TABLET    Take 10 mg by mouth once daily.    FAMOTIDINE (PEPCID) 20 MG TABLET        FLUTICASONE PROPIONATE (FLONASE ALLERGY RELIEF) 50 MCG/ACTUATION NASAL SPRAY        METOPROLOL  "TARTRATE (LOPRESSOR) 100 MG TABLET    Take 0.5 tablets (50 mg total) by mouth 2 (two) times daily.    PANTOPRAZOLE (PROTONIX) 40 MG TABLET    TAKE 1 TABLET BY MOUTH ONCE DAILY    SAFETY NEEDLES 23 GAUGE X 5/8" NDLE    1 each by Misc.(Non-Drug; Combo Route) route every 14 (fourteen) days.    SERTRALINE (ZOLOFT) 100 MG TABLET    Take 1 tablet (100 mg total) by mouth once daily.    SYRINGE WITH NEEDLE, SAFETY 3 ML 18 GAUGE X 1 1/2" SYRG    1 each by Misc.(Non-Drug; Combo Route) route every 14 (fourteen) days.    TESTOSTERONE CYPIONATE (DEPOTESTOTERONE CYPIONATE) 200 MG/ML INJECTION    Inject 1 mL (200 mg total) into the muscle every 14 (fourteen) days.    VITAMIN D2 50,000 UNIT CAPSULE    TAKE 1 CAPSULE BY MOUTH  EVERY 7 DAYS    XARELTO 20 MG TAB    Take 1 tablet (20 mg total) by mouth once daily.      Social History     Tobacco Use    Smoking status: Never Smoker    Smokeless tobacco: Never Used   Substance Use Topics    Alcohol use: Yes     Alcohol/week: 3.0 standard drinks     Types: 1 Shots of liquor, 2 Standard drinks or equivalent per week     Frequency: Monthly or less     Drinks per session: 1 or 2     Binge frequency: Less than monthly     Comment: 2 drinks per month      Family History   Problem Relation Age of Onset    Hypertension Mother     Arthritis Mother     Depression Mother     Hyperlipidemia Mother     COPD Father     Hypertension Father     Alcohol abuse Father     Arthritis Father     Cancer Father         Prostate cancer    Depression Father     Hearing loss Father     Heart disease Father     Hyperlipidemia Father     No Known Problems Sister     Arthritis Maternal Grandfather     Cancer Maternal Grandfather         Prostate cancer    Depression Maternal Grandfather     Diabetes Maternal Grandfather     Heart disease Maternal Grandfather     Hyperlipidemia Maternal Grandfather     Hypertension Maternal Grandfather     Kidney disease Maternal Grandfather     Stroke " Maternal Grandfather     Arthritis Paternal Grandmother     Depression Paternal Grandmother     Heart disease Paternal Grandmother     Hyperlipidemia Paternal Grandmother     Hypertension Paternal Grandmother     COPD Paternal Grandfather         Prostate cancer    Depression Paternal Grandfather     Allergic rhinitis Neg Hx     Allergies Neg Hx     Angioedema Neg Hx     Asthma Neg Hx     Atopy Neg Hx     Eczema Neg Hx     Immunodeficiency Neg Hx     Rhinitis Neg Hx     Urticaria Neg Hx     Glaucoma Neg Hx        I have reviewed the complete PMH, social history, surgical history, allergies and medications.  As well as family history.    Review of Systems see HPI otherwise negative  Objective:   /80   Physical Exam   Constitutional: He is oriented to person, place, and time. He appears well-developed and well-nourished. No distress.   HENT:   Head: Normocephalic and atraumatic.   Eyes: Pupils are equal, round, and reactive to light. EOM are normal.   Neck: Normal range of motion.   Pulmonary/Chest: Effort normal.   Musculoskeletal: Normal range of motion.   Neurological: He is alert and oriented to person, place, and time.   Skin: No rash noted. He is not diaphoretic.   Psychiatric: He has a normal mood and affect. His behavior is normal. Judgment and thought content normal. His mood appears not anxious. His affect is not angry, not blunt, not labile and not inappropriate. His speech is not rapid and/or pressured, not delayed, not tangential and not slurred. He is not agitated, not aggressive, not hyperactive, not slowed, not withdrawn, not actively hallucinating and not combative. Thought content is not paranoid and not delusional. Cognition and memory are normal. Cognition and memory are not impaired. He does not express impulsivity or inappropriate judgment. He does not exhibit a depressed mood. He expresses no homicidal and no suicidal ideation. He expresses no suicidal plans and no  homicidal plans. He is communicative. He is attentive.       Assessment:     1. Encounter for long-term (current) use of medications    2. Hypotestosteronemia      MDM:   Moderate complexity.  Moderate risk.  Patient was evaluated by telemedicine during COVID-19 outbreak.  Patient not having any symptoms of COVID-19.  I have Reviewed and summarized old records.  I have performed thorough medication reconciliation today and discussed risk and benefits of each medication.  I have reviewed  labs and discussed with patient.  All questions were answered.  I am requesting old records and will review them once they are available.  Cardiology    I have signed for the following orders AND/OR meds.  No orders of the defined types were placed in this encounter.          Follow up in about 3 months (around 7/30/2020), or if symptoms worsen or fail to improve, for Med refills, LAB RESULTS.    If no improvement in symptoms or symptoms worsen, advised to call/follow-up at clinic or go to ER. Patient voiced understanding and all questions/concerns were addressed.     DISCLAIMER: This note was compiled by using a speech recognition dictation system and therefore please be aware that typographical / speech recognition errors can and do occur.  Please contact me if you see any errors specifically.    Randal Keenan M.D.       Office: [phone] [address] [place] [address]  FAX: [phone]

## 2020-04-30 NOTE — ASSESSMENT & PLAN NOTE
April 2020:  Check labs.  Adjust dose accordingly.January 2020:  Increase dosage to 200 mg every 14 days.  Labs ordered for three months check.    Check testosterone and hemoglobin.  Previous plan:  Associated with fatigue.  Chronic.  Starting testosterone 50 mg every 2 weeks.  Recheck level in 3 months.  Advised of risk and benefits of testosterone hormone replacement.

## 2020-04-30 NOTE — PATIENT INSTRUCTIONS
Follow up in about 3 months (around 7/30/2020), or if symptoms worsen or fail to improve, for Med refills, LAB RESULTS.     If no improvement in symptoms or symptoms worsen, please be advised to call MD, follow-up at clinic and/or go to ER if becomes severe.    Randal Keenan M.D.        We Offer TELEHEALTH & Same Day Appointments!   Book your Telehealth appointment with me through my nurse or   Clinic appointments on [employer]!    [address] [place] [address]    Office: [phone]   FAX: [phone]    Check out my Facebook Page and Follow Me at: [email or web address]    Check out my website at [employer] by clicking on: [email or web address]    To Schedule appointments online, go to [facility]: [email or web address]

## 2020-05-01 ENCOUNTER — PATIENT MESSAGE (OUTPATIENT)
Dept: FAMILY MEDICINE | Facility: CLINIC | Age: 35
End: 2020-05-01

## 2020-05-01 ENCOUNTER — TELEPHONE (OUTPATIENT)
Dept: FAMILY MEDICINE | Facility: CLINIC | Age: 35
End: 2020-05-01

## 2020-05-01 DIAGNOSIS — E34.9 HYPOTESTOSTERONEMIA: Primary | ICD-10-CM

## 2020-05-01 DIAGNOSIS — E34.9 HYPOTESTOSTERONEMIA: Primary | Chronic | ICD-10-CM

## 2020-05-01 LAB — HIV 1+2 AB+HIV1 P24 AG SERPL QL IA: NEGATIVE

## 2020-05-01 RX ORDER — TESTOSTERONE CYPIONATE 200 MG/ML
300 INJECTION, SOLUTION INTRAMUSCULAR
Qty: 10 ML | Refills: 1 | Status: SHIPPED | OUTPATIENT
Start: 2020-05-01 | End: 2021-02-03 | Stop reason: SDUPTHER

## 2020-05-01 NOTE — PROGRESS NOTES
#CALL THE PATIENT# to discuss results/see if they have questions and document verification. Make FU appt if needed.    #Pend me the orders in my interpretation below.#    I have sent a message to them with the interpretation (see below).  See if they have any questions and make a follow-up appointment if not already schedule and if needed.    Also please address any outstanding health maintenance that may be due: There are no preventive care reminders to display for this patient.  ====================================    My interpretation that was sent to them through [employer]:    Karlos, I have reviewed your recent blood work.     Your complete blood count is stable and within normal limits.    Your liver function is within normal limits  Your cholesterol is improved.  Continue current medications.  LDL at goal  You testosterone level has improved but is still lower limits.  Increase supplementation to 300 mg per dose.    Repeat testosterone blood counts and liver function in three months.

## 2020-05-01 NOTE — TELEPHONE ENCOUNTER
----- Message from Randal Keenan MD sent at 5/1/2020  8:42 AM CDT -----  #CALL THE PATIENT# to discuss results/see if they have questions and document verification. Make FU appt if needed.    #Pend me the orders in my interpretation below.#    I have sent a message to them with the interpretation (see below).  See if they have any questions and make a follow-up appointment if not already schedule and if needed.    Also please address any outstanding health maintenance that may be due: There are no preventive care reminders to display for this patient.  ====================================    My interpretation that was sent to them through [employer]:    Karlos, I have reviewed your recent blood work.     Your complete blood count is stable and within normal limits.    Your liver function is within normal limits  Your cholesterol is improved.  Continue current medications.  LDL at goal  You testosterone level has improved but is still lower limits.  Increase supplementation to 300 mg per dose.    Repeat testosterone blood counts and liver function in three months.

## 2020-05-18 ENCOUNTER — PATIENT OUTREACH (OUTPATIENT)
Dept: OTHER | Facility: OTHER | Age: 35
End: 2020-05-18

## 2020-05-18 DIAGNOSIS — I10 HYPERTENSION, ESSENTIAL: Primary | ICD-10-CM

## 2021-01-26 ENCOUNTER — PATIENT MESSAGE (OUTPATIENT)
Dept: FAMILY MEDICINE | Facility: CLINIC | Age: 36
End: 2021-01-26

## 2021-01-26 DIAGNOSIS — Z79.899 ENCOUNTER FOR LONG-TERM (CURRENT) USE OF MEDICATIONS: ICD-10-CM

## 2021-01-26 DIAGNOSIS — E34.9 HYPOTESTOSTERONEMIA: ICD-10-CM

## 2021-01-26 DIAGNOSIS — Z12.5 ENCOUNTER FOR PROSTATE CANCER SCREENING: ICD-10-CM

## 2021-01-26 DIAGNOSIS — Z00.00 WELL ADULT EXAM: Primary | ICD-10-CM

## 2021-01-26 DIAGNOSIS — Z13.220 ENCOUNTER FOR LIPID SCREENING FOR CARDIOVASCULAR DISEASE: ICD-10-CM

## 2021-01-26 DIAGNOSIS — Z13.6 ENCOUNTER FOR LIPID SCREENING FOR CARDIOVASCULAR DISEASE: ICD-10-CM

## 2021-01-30 LAB
ALBUMIN SERPL-MCNC: 4.5 G/DL (ref 3.6–5.1)
ALBUMIN/GLOB SERPL: 1.7 (CALC) (ref 1–2.5)
ALP SERPL-CCNC: 60 U/L (ref 36–130)
ALT SERPL-CCNC: 19 U/L (ref 9–46)
AST SERPL-CCNC: 16 U/L (ref 10–40)
BASOPHILS # BLD AUTO: 74 CELLS/UL (ref 0–200)
BASOPHILS NFR BLD AUTO: 1.1 %
BILIRUB SERPL-MCNC: 0.6 MG/DL (ref 0.2–1.2)
BUN SERPL-MCNC: 11 MG/DL (ref 7–25)
BUN/CREAT SERPL: NORMAL (CALC) (ref 6–22)
CALCIUM SERPL-MCNC: 9.3 MG/DL (ref 8.6–10.3)
CHLORIDE SERPL-SCNC: 102 MMOL/L (ref 98–110)
CHOLEST SERPL-MCNC: 129 MG/DL
CHOLEST/HDLC SERPL: 3.6 (CALC)
CO2 SERPL-SCNC: 29 MMOL/L (ref 20–32)
CREAT SERPL-MCNC: 0.95 MG/DL (ref 0.6–1.35)
EOSINOPHIL # BLD AUTO: 221 CELLS/UL (ref 15–500)
EOSINOPHIL NFR BLD AUTO: 3.3 %
ERYTHROCYTE [DISTWIDTH] IN BLOOD BY AUTOMATED COUNT: 13 % (ref 11–15)
GFRSERPLBLD MDRD-ARVRAT: 105 ML/MIN/1.73M2
GLOBULIN SER CALC-MCNC: 2.7 G/DL (CALC) (ref 1.9–3.7)
GLUCOSE SERPL-MCNC: 93 MG/DL (ref 65–99)
HBA1C MFR BLD: 5.2 % OF TOTAL HGB
HCT VFR BLD AUTO: 43.7 % (ref 38.5–50)
HDLC SERPL-MCNC: 36 MG/DL
HGB BLD-MCNC: 14.7 G/DL (ref 13.2–17.1)
LDLC SERPL CALC-MCNC: 74 MG/DL (CALC)
LYMPHOCYTES # BLD AUTO: 1682 CELLS/UL (ref 850–3900)
LYMPHOCYTES NFR BLD AUTO: 25.1 %
MCH RBC QN AUTO: 29.5 PG (ref 27–33)
MCHC RBC AUTO-ENTMCNC: 33.6 G/DL (ref 32–36)
MCV RBC AUTO: 87.6 FL (ref 80–100)
MONOCYTES # BLD AUTO: 422 CELLS/UL (ref 200–950)
MONOCYTES NFR BLD AUTO: 6.3 %
NEUTROPHILS # BLD AUTO: 4301 CELLS/UL (ref 1500–7800)
NEUTROPHILS NFR BLD AUTO: 64.2 %
NONHDLC SERPL-MCNC: 93 MG/DL (CALC)
PLATELET # BLD AUTO: 226 THOUSAND/UL (ref 140–400)
PMV BLD REES-ECKER: 11.3 FL (ref 7.5–12.5)
POTASSIUM SERPL-SCNC: 3.8 MMOL/L (ref 3.5–5.3)
PROT SERPL-MCNC: 7.2 G/DL (ref 6.1–8.1)
PSA SERPL-MCNC: 1.1 NG/ML
RBC # BLD AUTO: 4.99 MILLION/UL (ref 4.2–5.8)
SODIUM SERPL-SCNC: 142 MMOL/L (ref 135–146)
TESTOST SERPL-MCNC: 373 NG/DL (ref 250–827)
TRIGL SERPL-MCNC: 111 MG/DL
TSH SERPL-ACNC: 1.55 MIU/L (ref 0.4–4.5)
WBC # BLD AUTO: 6.7 THOUSAND/UL (ref 3.8–10.8)

## 2021-02-03 ENCOUNTER — OFFICE VISIT (OUTPATIENT)
Dept: FAMILY MEDICINE | Facility: CLINIC | Age: 36
End: 2021-02-03
Payer: COMMERCIAL

## 2021-02-03 VITALS — SYSTOLIC BLOOD PRESSURE: 128 MMHG | DIASTOLIC BLOOD PRESSURE: 88 MMHG | HEART RATE: 80 BPM

## 2021-02-03 DIAGNOSIS — E78.2 MIXED HYPERLIPIDEMIA: ICD-10-CM

## 2021-02-03 DIAGNOSIS — I48.0 PAROXYSMAL ATRIAL FIBRILLATION: Chronic | ICD-10-CM

## 2021-02-03 DIAGNOSIS — Z79.899 ENCOUNTER FOR LONG-TERM (CURRENT) USE OF MEDICATIONS: Chronic | ICD-10-CM

## 2021-02-03 DIAGNOSIS — I10 HYPERTENSION, ESSENTIAL: Primary | Chronic | ICD-10-CM

## 2021-02-03 DIAGNOSIS — E34.9 HYPOTESTOSTERONEMIA: Chronic | ICD-10-CM

## 2021-02-03 PROCEDURE — 3074F PR MOST RECENT SYSTOLIC BLOOD PRESSURE < 130 MM HG: ICD-10-PCS | Mod: CPTII,95,, | Performed by: FAMILY MEDICINE

## 2021-02-03 PROCEDURE — 3074F SYST BP LT 130 MM HG: CPT | Mod: CPTII,95,, | Performed by: FAMILY MEDICINE

## 2021-02-03 PROCEDURE — 3079F DIAST BP 80-89 MM HG: CPT | Mod: CPTII,95,, | Performed by: FAMILY MEDICINE

## 2021-02-03 PROCEDURE — 99214 OFFICE O/P EST MOD 30 MIN: CPT | Mod: 95,,, | Performed by: FAMILY MEDICINE

## 2021-02-03 PROCEDURE — 3079F PR MOST RECENT DIASTOLIC BLOOD PRESSURE 80-89 MM HG: ICD-10-PCS | Mod: CPTII,95,, | Performed by: FAMILY MEDICINE

## 2021-02-03 PROCEDURE — 99214 PR OFFICE/OUTPT VISIT, EST, LEVL IV, 30-39 MIN: ICD-10-PCS | Mod: 95,,, | Performed by: FAMILY MEDICINE

## 2021-02-03 RX ORDER — TESTOSTERONE CYPIONATE 200 MG/ML
300 INJECTION, SOLUTION INTRAMUSCULAR
Qty: 10 ML | Refills: 1 | Status: SHIPPED | OUTPATIENT
Start: 2021-02-03 | End: 2022-03-14

## 2021-02-03 RX ORDER — METOPROLOL TARTRATE 100 MG/1
100 TABLET ORAL 2 TIMES DAILY
Qty: 180 TABLET | Refills: 1 | Status: SHIPPED | OUTPATIENT
Start: 2021-02-03 | End: 2021-05-31 | Stop reason: SDUPTHER

## 2021-02-13 ENCOUNTER — PATIENT MESSAGE (OUTPATIENT)
Dept: ADMINISTRATIVE | Facility: OTHER | Age: 36
End: 2021-02-13

## 2021-07-09 DIAGNOSIS — Z79.899 ENCOUNTER FOR LONG-TERM (CURRENT) USE OF MEDICATIONS: ICD-10-CM

## 2021-07-09 DIAGNOSIS — I10 HYPERTENSION, ESSENTIAL: ICD-10-CM

## 2021-07-11 RX ORDER — AMLODIPINE BESYLATE 10 MG/1
TABLET ORAL
Qty: 30 TABLET | Refills: 12 | Status: SHIPPED | OUTPATIENT
Start: 2021-07-11 | End: 2022-09-09 | Stop reason: SDUPTHER

## 2021-07-20 ENCOUNTER — OFFICE VISIT (OUTPATIENT)
Dept: OTOLARYNGOLOGY | Facility: CLINIC | Age: 36
End: 2021-07-20
Payer: COMMERCIAL

## 2021-07-20 VITALS — BODY MASS INDEX: 44.16 KG/M2 | WEIGHT: 308.44 LBS | TEMPERATURE: 99 F | HEIGHT: 70 IN

## 2021-07-20 DIAGNOSIS — J32.0 CHRONIC MAXILLARY SINUSITIS: Primary | ICD-10-CM

## 2021-07-20 DIAGNOSIS — J35.1 TONSILLAR HYPERTROPHY: ICD-10-CM

## 2021-07-20 DIAGNOSIS — J34.3 NASAL TURBINATE HYPERTROPHY: ICD-10-CM

## 2021-07-20 DIAGNOSIS — G47.30 SLEEP DISORDER BREATHING: ICD-10-CM

## 2021-07-20 PROCEDURE — 3008F PR BODY MASS INDEX (BMI) DOCUMENTED: ICD-10-PCS | Mod: CPTII,S$GLB,, | Performed by: OTOLARYNGOLOGY

## 2021-07-20 PROCEDURE — 1160F RVW MEDS BY RX/DR IN RCRD: CPT | Mod: CPTII,S$GLB,, | Performed by: OTOLARYNGOLOGY

## 2021-07-20 PROCEDURE — 99214 OFFICE O/P EST MOD 30 MIN: CPT | Mod: S$GLB,,, | Performed by: OTOLARYNGOLOGY

## 2021-07-20 PROCEDURE — 99214 PR OFFICE/OUTPT VISIT, EST, LEVL IV, 30-39 MIN: ICD-10-PCS | Mod: S$GLB,,, | Performed by: OTOLARYNGOLOGY

## 2021-07-20 PROCEDURE — 3008F BODY MASS INDEX DOCD: CPT | Mod: CPTII,S$GLB,, | Performed by: OTOLARYNGOLOGY

## 2021-07-20 PROCEDURE — 3044F PR MOST RECENT HEMOGLOBIN A1C LEVEL <7.0%: ICD-10-PCS | Mod: CPTII,S$GLB,, | Performed by: OTOLARYNGOLOGY

## 2021-07-20 PROCEDURE — 99999 PR PBB SHADOW E&M-EST. PATIENT-LVL III: CPT | Mod: PBBFAC,,, | Performed by: OTOLARYNGOLOGY

## 2021-07-20 PROCEDURE — 1159F PR MEDICATION LIST DOCUMENTED IN MEDICAL RECORD: ICD-10-PCS | Mod: CPTII,S$GLB,, | Performed by: OTOLARYNGOLOGY

## 2021-07-20 PROCEDURE — 1159F MED LIST DOCD IN RCRD: CPT | Mod: CPTII,S$GLB,, | Performed by: OTOLARYNGOLOGY

## 2021-07-20 PROCEDURE — 3044F HG A1C LEVEL LT 7.0%: CPT | Mod: CPTII,S$GLB,, | Performed by: OTOLARYNGOLOGY

## 2021-07-20 PROCEDURE — 1125F AMNT PAIN NOTED PAIN PRSNT: CPT | Mod: CPTII,S$GLB,, | Performed by: OTOLARYNGOLOGY

## 2021-07-20 PROCEDURE — 1160F PR REVIEW ALL MEDS BY PRESCRIBER/CLIN PHARMACIST DOCUMENTED: ICD-10-PCS | Mod: CPTII,S$GLB,, | Performed by: OTOLARYNGOLOGY

## 2021-07-20 PROCEDURE — 99999 PR PBB SHADOW E&M-EST. PATIENT-LVL III: ICD-10-PCS | Mod: PBBFAC,,, | Performed by: OTOLARYNGOLOGY

## 2021-07-20 PROCEDURE — 1125F PR PAIN SEVERITY QUANTIFIED, PAIN PRESENT: ICD-10-PCS | Mod: CPTII,S$GLB,, | Performed by: OTOLARYNGOLOGY

## 2021-07-20 RX ORDER — BENZONATATE 100 MG/1
200 CAPSULE ORAL EVERY 8 HOURS PRN
COMMUNITY
Start: 2021-06-10 | End: 2021-08-05

## 2021-08-02 ENCOUNTER — PATIENT MESSAGE (OUTPATIENT)
Dept: FAMILY MEDICINE | Facility: CLINIC | Age: 36
End: 2021-08-02

## 2021-08-02 DIAGNOSIS — E34.9 HYPOTESTOSTERONEMIA: Primary | ICD-10-CM

## 2021-08-04 LAB — TESTOST SERPL-MCNC: 258 NG/DL (ref 250–827)

## 2021-08-05 ENCOUNTER — OFFICE VISIT (OUTPATIENT)
Dept: FAMILY MEDICINE | Facility: CLINIC | Age: 36
End: 2021-08-05
Payer: COMMERCIAL

## 2021-08-05 VITALS
SYSTOLIC BLOOD PRESSURE: 118 MMHG | WEIGHT: 302 LBS | BODY MASS INDEX: 43.23 KG/M2 | HEART RATE: 75 BPM | DIASTOLIC BLOOD PRESSURE: 74 MMHG | HEIGHT: 70 IN

## 2021-08-05 DIAGNOSIS — Z79.899 ENCOUNTER FOR LONG-TERM (CURRENT) USE OF MEDICATIONS: ICD-10-CM

## 2021-08-05 DIAGNOSIS — E34.9 HYPOTESTOSTERONEMIA: ICD-10-CM

## 2021-08-05 DIAGNOSIS — Z13.220 ENCOUNTER FOR LIPID SCREENING FOR CARDIOVASCULAR DISEASE: ICD-10-CM

## 2021-08-05 DIAGNOSIS — Z13.6 ENCOUNTER FOR LIPID SCREENING FOR CARDIOVASCULAR DISEASE: ICD-10-CM

## 2021-08-05 DIAGNOSIS — E66.2 CLASS 3 OBESITY WITH ALVEOLAR HYPOVENTILATION, SERIOUS COMORBIDITY, AND BODY MASS INDEX (BMI) OF 40.0 TO 44.9 IN ADULT: ICD-10-CM

## 2021-08-05 DIAGNOSIS — Z00.00 WELL ADULT EXAM: Primary | ICD-10-CM

## 2021-08-05 PROBLEM — E66.813 CLASS 3 OBESITY WITH ALVEOLAR HYPOVENTILATION, SERIOUS COMORBIDITY, AND BODY MASS INDEX (BMI) OF 40.0 TO 44.9 IN ADULT: Chronic | Status: ACTIVE | Noted: 2021-08-05

## 2021-08-05 PROBLEM — E66.813 CLASS 3 OBESITY WITH ALVEOLAR HYPOVENTILATION, SERIOUS COMORBIDITY, AND BODY MASS INDEX (BMI) OF 40.0 TO 44.9 IN ADULT: Status: ACTIVE | Noted: 2021-08-05

## 2021-08-05 PROCEDURE — 3074F SYST BP LT 130 MM HG: CPT | Mod: CPTII,95,, | Performed by: FAMILY MEDICINE

## 2021-08-05 PROCEDURE — 3008F PR BODY MASS INDEX (BMI) DOCUMENTED: ICD-10-PCS | Mod: CPTII,95,, | Performed by: FAMILY MEDICINE

## 2021-08-05 PROCEDURE — 3044F PR MOST RECENT HEMOGLOBIN A1C LEVEL <7.0%: ICD-10-PCS | Mod: CPTII,95,, | Performed by: FAMILY MEDICINE

## 2021-08-05 PROCEDURE — 3008F BODY MASS INDEX DOCD: CPT | Mod: CPTII,95,, | Performed by: FAMILY MEDICINE

## 2021-08-05 PROCEDURE — 1160F PR REVIEW ALL MEDS BY PRESCRIBER/CLIN PHARMACIST DOCUMENTED: ICD-10-PCS | Mod: CPTII,95,, | Performed by: FAMILY MEDICINE

## 2021-08-05 PROCEDURE — 99395 PREV VISIT EST AGE 18-39: CPT | Mod: 95,,, | Performed by: FAMILY MEDICINE

## 2021-08-05 PROCEDURE — 1159F MED LIST DOCD IN RCRD: CPT | Mod: CPTII,95,, | Performed by: FAMILY MEDICINE

## 2021-08-05 PROCEDURE — 3078F DIAST BP <80 MM HG: CPT | Mod: CPTII,95,, | Performed by: FAMILY MEDICINE

## 2021-08-05 PROCEDURE — 3074F PR MOST RECENT SYSTOLIC BLOOD PRESSURE < 130 MM HG: ICD-10-PCS | Mod: CPTII,95,, | Performed by: FAMILY MEDICINE

## 2021-08-05 PROCEDURE — 3078F PR MOST RECENT DIASTOLIC BLOOD PRESSURE < 80 MM HG: ICD-10-PCS | Mod: CPTII,95,, | Performed by: FAMILY MEDICINE

## 2021-08-05 PROCEDURE — 1160F RVW MEDS BY RX/DR IN RCRD: CPT | Mod: CPTII,95,, | Performed by: FAMILY MEDICINE

## 2021-08-05 PROCEDURE — 3044F HG A1C LEVEL LT 7.0%: CPT | Mod: CPTII,95,, | Performed by: FAMILY MEDICINE

## 2021-08-05 PROCEDURE — 99395 PR PREVENTIVE VISIT,EST,18-39: ICD-10-PCS | Mod: 95,,, | Performed by: FAMILY MEDICINE

## 2021-08-05 PROCEDURE — 1159F PR MEDICATION LIST DOCUMENTED IN MEDICAL RECORD: ICD-10-PCS | Mod: CPTII,95,, | Performed by: FAMILY MEDICINE

## 2021-08-06 ENCOUNTER — PATIENT MESSAGE (OUTPATIENT)
Dept: UROLOGY | Facility: CLINIC | Age: 36
End: 2021-08-06

## 2021-08-10 ENCOUNTER — TELEPHONE (OUTPATIENT)
Dept: UROLOGY | Facility: CLINIC | Age: 36
End: 2021-08-10

## 2021-08-11 ENCOUNTER — TELEPHONE (OUTPATIENT)
Dept: UROLOGY | Facility: CLINIC | Age: 36
End: 2021-08-11

## 2021-08-23 ENCOUNTER — TELEPHONE (OUTPATIENT)
Dept: UROLOGY | Facility: CLINIC | Age: 36
End: 2021-08-23

## 2021-08-25 ENCOUNTER — PATIENT MESSAGE (OUTPATIENT)
Dept: FAMILY MEDICINE | Facility: CLINIC | Age: 36
End: 2021-08-25

## 2021-09-10 ENCOUNTER — TELEPHONE (OUTPATIENT)
Dept: FAMILY MEDICINE | Facility: CLINIC | Age: 36
End: 2021-09-10

## 2021-10-03 RX ORDER — SERTRALINE HYDROCHLORIDE 100 MG/1
TABLET, FILM COATED ORAL
Qty: 90 TABLET | Refills: 4 | Status: SHIPPED | OUTPATIENT
Start: 2021-10-03 | End: 2022-09-09 | Stop reason: SDUPTHER

## 2021-10-27 ENCOUNTER — HOSPITAL ENCOUNTER (OUTPATIENT)
Dept: RADIOLOGY | Facility: HOSPITAL | Age: 36
Discharge: HOME | End: 2021-10-27
Attending: NURSE PRACTITIONER
Payer: COMMERCIAL

## 2021-10-27 ENCOUNTER — OFFICE VISIT (OUTPATIENT)
Dept: UROLOGY | Facility: CLINIC | Age: 36
End: 2021-10-27
Payer: COMMERCIAL

## 2021-10-27 VITALS
DIASTOLIC BLOOD PRESSURE: 81 MMHG | WEIGHT: 313.25 LBS | HEIGHT: 70 IN | SYSTOLIC BLOOD PRESSURE: 120 MMHG | BODY MASS INDEX: 44.85 KG/M2 | HEART RATE: 92 BPM | TEMPERATURE: 99 F

## 2021-10-27 DIAGNOSIS — N20.0 RENAL STONES: ICD-10-CM

## 2021-10-27 DIAGNOSIS — N20.0 RENAL STONES: Primary | ICD-10-CM

## 2021-10-27 PROBLEM — R06.83 SNORING: Status: RESOLVED | Noted: 2017-06-28 | Resolved: 2021-10-27

## 2021-10-27 PROBLEM — M21.40 FLAT FOOT: Status: RESOLVED | Noted: 2017-06-30 | Resolved: 2021-10-27

## 2021-10-27 PROBLEM — G47.8 UNREFRESHED BY SLEEP: Status: RESOLVED | Noted: 2017-06-28 | Resolved: 2021-10-27

## 2021-10-27 PROBLEM — R53.83 OTHER FATIGUE: Status: RESOLVED | Noted: 2019-07-23 | Resolved: 2021-10-27

## 2021-10-27 PROBLEM — R68.89 COLD INTOLERANCE OF HAND: Status: RESOLVED | Noted: 2019-07-23 | Resolved: 2021-10-27

## 2021-10-27 PROBLEM — Z76.89 ENCOUNTER TO ESTABLISH CARE: Status: RESOLVED | Noted: 2019-07-23 | Resolved: 2021-10-27

## 2021-10-27 PROCEDURE — 3079F PR MOST RECENT DIASTOLIC BLOOD PRESSURE 80-89 MM HG: ICD-10-PCS | Mod: CPTII,S$GLB,, | Performed by: NURSE PRACTITIONER

## 2021-10-27 PROCEDURE — 99204 PR OFFICE/OUTPT VISIT, NEW, LEVL IV, 45-59 MIN: ICD-10-PCS | Mod: S$GLB,,, | Performed by: NURSE PRACTITIONER

## 2021-10-27 PROCEDURE — 74018 XR ABDOMEN AP 1 VIEW: ICD-10-PCS | Mod: 26,,, | Performed by: RADIOLOGY

## 2021-10-27 PROCEDURE — 3008F PR BODY MASS INDEX (BMI) DOCUMENTED: ICD-10-PCS | Mod: CPTII,S$GLB,, | Performed by: NURSE PRACTITIONER

## 2021-10-27 PROCEDURE — 99999 PR PBB SHADOW E&M-EST. PATIENT-LVL V: CPT | Mod: PBBFAC,,, | Performed by: NURSE PRACTITIONER

## 2021-10-27 PROCEDURE — 99999 PR PBB SHADOW E&M-EST. PATIENT-LVL V: ICD-10-PCS | Mod: PBBFAC,,, | Performed by: NURSE PRACTITIONER

## 2021-10-27 PROCEDURE — 3044F PR MOST RECENT HEMOGLOBIN A1C LEVEL <7.0%: ICD-10-PCS | Mod: CPTII,S$GLB,, | Performed by: NURSE PRACTITIONER

## 2021-10-27 PROCEDURE — 3074F SYST BP LT 130 MM HG: CPT | Mod: CPTII,S$GLB,, | Performed by: NURSE PRACTITIONER

## 2021-10-27 PROCEDURE — 1159F MED LIST DOCD IN RCRD: CPT | Mod: CPTII,S$GLB,, | Performed by: NURSE PRACTITIONER

## 2021-10-27 PROCEDURE — 74018 RADEX ABDOMEN 1 VIEW: CPT | Mod: 26,,, | Performed by: RADIOLOGY

## 2021-10-27 PROCEDURE — 1159F PR MEDICATION LIST DOCUMENTED IN MEDICAL RECORD: ICD-10-PCS | Mod: CPTII,S$GLB,, | Performed by: NURSE PRACTITIONER

## 2021-10-27 PROCEDURE — 3079F DIAST BP 80-89 MM HG: CPT | Mod: CPTII,S$GLB,, | Performed by: NURSE PRACTITIONER

## 2021-10-27 PROCEDURE — 3074F PR MOST RECENT SYSTOLIC BLOOD PRESSURE < 130 MM HG: ICD-10-PCS | Mod: CPTII,S$GLB,, | Performed by: NURSE PRACTITIONER

## 2021-10-27 PROCEDURE — 4010F PR ACE/ARB THEARPY RXD/TAKEN: ICD-10-PCS | Mod: CPTII,S$GLB,, | Performed by: NURSE PRACTITIONER

## 2021-10-27 PROCEDURE — 4010F ACE/ARB THERAPY RXD/TAKEN: CPT | Mod: CPTII,S$GLB,, | Performed by: NURSE PRACTITIONER

## 2021-10-27 PROCEDURE — 3008F BODY MASS INDEX DOCD: CPT | Mod: CPTII,S$GLB,, | Performed by: NURSE PRACTITIONER

## 2021-10-27 PROCEDURE — 3044F HG A1C LEVEL LT 7.0%: CPT | Mod: CPTII,S$GLB,, | Performed by: NURSE PRACTITIONER

## 2021-10-27 PROCEDURE — 99204 OFFICE O/P NEW MOD 45 MIN: CPT | Mod: S$GLB,,, | Performed by: NURSE PRACTITIONER

## 2021-10-27 PROCEDURE — 74018 RADEX ABDOMEN 1 VIEW: CPT | Mod: TC

## 2021-10-27 RX ORDER — TAMSULOSIN HYDROCHLORIDE 0.4 MG/1
1 CAPSULE ORAL DAILY
COMMUNITY
Start: 2021-10-24 | End: 2021-11-03

## 2021-10-27 RX ORDER — CEFDINIR 300 MG/1
600 CAPSULE ORAL
COMMUNITY
Start: 2021-10-24 | End: 2021-10-31

## 2021-11-03 RX ORDER — TAMSULOSIN HYDROCHLORIDE 0.4 MG/1
0.4 CAPSULE ORAL DAILY
Qty: 30 CAPSULE | Refills: 0 | Status: SHIPPED | OUTPATIENT
Start: 2021-11-03 | End: 2022-03-14

## 2021-11-09 ENCOUNTER — HOSPITAL ENCOUNTER (OUTPATIENT)
Dept: RADIOLOGY | Facility: HOSPITAL | Age: 36
Discharge: HOME | End: 2021-11-09
Attending: NURSE PRACTITIONER
Payer: COMMERCIAL

## 2021-11-09 DIAGNOSIS — N20.0 RENAL STONES: ICD-10-CM

## 2021-11-09 PROCEDURE — 76770 US RETROPERITONEAL COMPLETE: ICD-10-PCS | Mod: 26,,, | Performed by: RADIOLOGY

## 2021-11-09 PROCEDURE — 76770 US EXAM ABDO BACK WALL COMP: CPT | Mod: 26,,, | Performed by: RADIOLOGY

## 2021-11-09 PROCEDURE — 76770 US EXAM ABDO BACK WALL COMP: CPT | Mod: TC

## 2021-12-23 NOTE — TELEPHONE ENCOUNTER
Care Due:                  Date            Visit Type   Department     Provider  --------------------------------------------------------------------------------                                JUJU                              [facility]      [facility] FAMILY  Last Visit: 08-      VISIT        MEDICINE       Randal Keenan  Next Visit: None Scheduled  None         None Found                                                            Last  Test          Frequency    Reason                     Performed    Due Date  --------------------------------------------------------------------------------    CMP.........  12 months..  benazepril-hydrochlorthia  Not Found    Overdue                             dion.....................    Powered by [employer]. Reference number: [id number].   12/22/2021 10:18:18 PM CST

## 2021-12-31 RX ORDER — PANTOPRAZOLE SODIUM 40 MG/1
TABLET, DELAYED RELEASE ORAL
Qty: 90 TABLET | Refills: 2 | Status: SHIPPED | OUTPATIENT
Start: 2021-12-31 | End: 2022-01-24 | Stop reason: SDUPTHER

## 2021-12-31 NOTE — TELEPHONE ENCOUNTER
Refill Routing Note   Medication(s) are not appropriate for processing by [facility] for the following reason(s):      - Patient has been seen in the Emergency Room/Department since the last PCP visit    [facility] action(s):  Defer       Medication Therapy Plan: Recent ED visit for Flank Pain (10/24/21); FLOS; Defer to PCP  --->Care Gap information included in message below if applicable.   Medication reconciliation completed: No   Automatic Epic Generated Protocol Data:        Requested Prescriptions   Pending Prescriptions Disp Refills    pantoprazole (PROTONIX) 40 MG tablet [Pharmacy Med Name: Pantoprazole Sodium 40 MG Oral Tablet Delayed Release] 90 tablet 2     Sig: TAKE 1 TABLET BY MOUTH ONCE DAILY       Gastroenterology: Proton Pump Inhibitors 2 Passed - 12/22/2021 10:17 PM        Passed - Patient is at least 18 years old        Passed - Osteoporosis is not on problem list        Passed - An appropriate indication is on the problem list        Passed - Office visit in past 12 months.     Recent Visits  Date Type Provider Dept   08/05/21 Office Visit Randal Keenan MD [facility]   02/03/21 Office Visit Randal Keenan MD [facility]   04/30/20 Office Visit Randal Keenan MD [facility]   01/30/20 Office Visit Randal Keenan MD [facility]   Showing recent visits within past 720 days and meeting all other requirements  Future Appointments  No visits were found meeting these conditions.  Showing future appointments within next 150 days and meeting all other requirements      Future Appointments              In 4 weeks MD Re Mcnally - Urology, [facility]    In 1 month LABORATORY, [facility]    In 1 month MD Cherie Guthrie - Family Medicine, [facility]                      Appointments  past 12m or future 3m with PCP    Date Provider   Last Visit   8/5/2021 Randal Keenan MD   Next Visit   2/7/2022  Randal Keenan MD   ED visits in past 90 days: 0        Note composed:8:45 PM 12/30/2021

## 2022-01-24 DIAGNOSIS — K21.9 GASTROESOPHAGEAL REFLUX DISEASE, UNSPECIFIED WHETHER ESOPHAGITIS PRESENT: Primary | ICD-10-CM

## 2022-01-24 RX ORDER — PANTOPRAZOLE SODIUM 40 MG/1
40 TABLET, DELAYED RELEASE ORAL DAILY
Qty: 90 TABLET | Refills: 4 | Status: SHIPPED | OUTPATIENT
Start: 2022-01-24 | End: 2022-09-09 | Stop reason: SDUPTHER

## 2022-01-24 NOTE — TELEPHONE ENCOUNTER
No new care gaps identified.  Powered by [employer]. Reference number: [id number].   1/24/2022 2:44:40 PM CST

## 2022-03-14 ENCOUNTER — OFFICE VISIT (OUTPATIENT)
Dept: FAMILY MEDICINE | Facility: CLINIC | Age: 37
End: 2022-03-14
Payer: COMMERCIAL

## 2022-03-14 VITALS
DIASTOLIC BLOOD PRESSURE: 74 MMHG | RESPIRATION RATE: 12 BRPM | WEIGHT: 310 LBS | SYSTOLIC BLOOD PRESSURE: 118 MMHG | BODY MASS INDEX: 44.48 KG/M2

## 2022-03-14 DIAGNOSIS — R51.9 NONINTRACTABLE HEADACHE, UNSPECIFIED CHRONICITY PATTERN, UNSPECIFIED HEADACHE TYPE: Primary | ICD-10-CM

## 2022-03-14 PROCEDURE — 99499 UNLISTED E&M SERVICE: CPT | Mod: 95,,, | Performed by: NURSE PRACTITIONER

## 2022-03-14 PROCEDURE — 99499 NO LOS: ICD-10-PCS | Mod: 95,,, | Performed by: NURSE PRACTITIONER

## 2022-03-14 NOTE — LETTER
March 28, 2022      [facility]  [address] [address]  Phone: [phone]  Fax: [phone]       Patient: Karlos Clark   YOB: 1985  Date of Visit: 03/28/2022    To Whom It May Concern:    Nilo Clark  was at [facility] on 03/14/2022. Please excuse him from work for 3/13/2022 we discussed his complaints on 3/13/2022. The patient may return to work/school on 3/14/2022 with no restrictions. If you have any questions or concerns, or if I can be of further assistance, please do not hesitate to contact me.    Sincerely,    Federico Mcclain NP

## 2022-03-14 NOTE — PROGRESS NOTES
This dictation has been generated using [employer] Dictation some phonetic errors may occur. Please contact author for clarification if needed.     Problem List Items Addressed This Visit    None     Visit Diagnoses     Nonintractable headache, unspecified chronicity pattern, unspecified headache type    -  Primary               H/A mild dehydration. Hydrate. RTW today.   No LOS technical difficulty.     No follow-ups on file.    ________________________________________________________________  ________________________________________________________________      No chief complaint on file.    History of present illness  This 36 y.o. presents today for complaint of headache  The patient location is: [place]  The chief complaint leading to consultation is: ha    Visit type: audio only    Face to Face time with patient: only able to see pt briefly.   1 minutes of total time spent on the encounter, which includes face to face time and non-face to face time preparing to see the patient (eg, review of tests), Obtaining and/or reviewing separately obtained history, Documenting clinical information in the electronic or other health record, Independently interpreting results (not separately reported) and communicating results to the patient/family/caregiver, or Care coordination (not separately reported).         Each patient to whom he or she provides medical services by telemedicine is:  (1) informed of the relationship between the physician and patient and the respective role of any other health care provider with respect to management of the patient; and (2) notified that he or she may decline to receive medical services by telemedicine and may withdraw from such care at any time.    Notes:   From our discussion on the phone he had headache Sunday and spoke to me. I recommended hydration.     Past Medical History:   Diagnosis Date    Benign essential hypertension 2/20/2013    Depression 12/7/2011    Encounter for  long-term (current) use of medications 7/23/2019 07/23/2019  Patient is on CHRONIC long-term drug therapy for managed conditions. See medication list. Reports compliance.  No side effects reported.  Routine lab work is being monitored.  Patient does  need refills today.   Lab Results Component Value Date  WBC 6.34 09/06/2018  HGB 14.1 09/06/2018  HCT 42.6 09/06/2018  MCV 86 09/06/2018   09/06/2018   CMP Sodium Date Value Ref Range Status     GERD (gastroesophageal reflux disease)     Hypercholesterolemia 2/20/2013    Chronic.  Uncontrolled.  Patient not on medications currently.  Patient has been on lovastatin in the past.  Not adherent to diet.  Lab Results Component Value Date  CHOL 213 (H) 07/24/2019  CHOL 162 09/06/2018  CHOL 181 04/20/2018  Lab Results Component Value Date  HDL 43 07/24/2019  HDL 33 (L) 09/06/2018  HDL 37 (L) 04/20/2018  Lab Results Component Value Date  LDLCALC 139 (H) 07/24/2019  LDLCAL    Hypertension 12/7/2011    Hypotestosteronemia 8/15/2019    Chronic .  Untreated. Lab Results Component Value Date  TESTOSTERONE 221 (L) 07/24/2019  Patient interested in starting replacement.    Obesity 12/7/2011    Vitamin D deficiency 8/15/2019    Chronic.  Untreated.  Patient taking vitamin-D supplementation.  Last vitamin-D level was low.       Past Surgical History:   Procedure Laterality Date    bladder      Pt was having bladder control problems.  Bladder was stretched approx 7 yrs old    SINUS SURGERY         Family History   Problem Relation Age of Onset    Hypertension Mother     Arthritis Mother     Depression Mother     Hyperlipidemia Mother     COPD Father     Hypertension Father     Alcohol abuse Father     Arthritis Father     Cancer Father         Prostate cancer    Depression Father     Hearing loss Father     Heart disease Father     Hyperlipidemia Father     No Known Problems Sister     Arthritis Maternal Grandfather     Cancer Maternal Grandfather          Prostate cancer    Depression Maternal Grandfather     Diabetes Maternal Grandfather     Heart disease Maternal Grandfather     Hyperlipidemia Maternal Grandfather     Hypertension Maternal Grandfather     Kidney disease Maternal Grandfather     Stroke Maternal Grandfather     Arthritis Paternal Grandmother     Depression Paternal Grandmother     Heart disease Paternal Grandmother     Hyperlipidemia Paternal Grandmother     Hypertension Paternal Grandmother     COPD Paternal Grandfather         Prostate cancer    Depression Paternal Grandfather     Allergic rhinitis Neg Hx     Allergies Neg Hx     Angioedema Neg Hx     Asthma Neg Hx     Atopy Neg Hx     Eczema Neg Hx     Immunodeficiency Neg Hx     Rhinitis Neg Hx     Urticaria Neg Hx     Glaucoma Neg Hx        Social History     Socioeconomic History    Marital status: Single   Tobacco Use    Smoking status: Never Smoker    Smokeless tobacco: Never Used   Substance and Sexual Activity    Alcohol use: Yes     Alcohol/week: 1.0 standard drink     Types: 1 Standard drinks or equivalent per week     Comment: 2 drinks per month    Drug use: No    Sexual activity: Not Currently     Partners: Female, Male     Birth control/protection: Condom     Social Determinants of Health     Financial Resource Strain: Low Risk     Difficulty of Paying Living Expenses: Not very hard   Food Insecurity: No Food Insecurity    Worried About Running Out of Food in the Last Year: Never true    Ran Out of Food in the Last Year: Never true   Transportation Needs: No Transportation Needs    Lack of Transportation (Medical): No    Lack of Transportation (Non-Medical): No   Physical Activity: Insufficiently Active    Days of Exercise per Week: 3 days    Minutes of Exercise per Session: 30 min   Stress: Stress Concern Present    Feeling of Stress : To some extent   Social Connections: Unknown    Frequency of Communication with Friends and Family: Twice a  week    Frequency of Social Gatherings with Friends and Family: Three times a week    Active Member of Clubs or Organizations: No    Attends Club or Organization Meetings: Never    Marital Status: Never    Housing Stability: Low Risk     Unable to Pay for Housing in the Last Year: No    Number of Places Lived in the Last Year: 1    Unstable Housing in the Last Year: No       Current Outpatient Medications   Medication Sig Dispense Refill    amLODIPine (NORVASC) 10 MG tablet TAKE 1 TABLET BY MOUTH ONCE DAILY 30 tablet 12    atorvastatin (LIPITOR) 40 MG tablet TAKE 1 TABLET BY MOUTH ONCE DAILY 90 tablet 3    azelastine (ASTELIN) 137 mcg (0.1 %) nasal spray INSTILL 1 SPRAY IN EACH  NOSTRIL TWO TIMES A DAY AS  DIRECTED 30 mL 11    benazepril-hydrochlorthiazide (LOTENSIN HCT) 20-25 mg Tab TAKE 1 TABLET BY MOUTH ONCE DAILY 90 tablet 3    cetirizine (ZYRTEC) 10 MG tablet Take 10 mg by mouth once daily.      fluticasone propionate (FLONASE) 50 mcg/actuation nasal spray       metoprolol tartrate (LOPRESSOR) 100 MG tablet TAKE 1 TABLET BY MOUTH  TWICE DAILY 180 tablet 3    pantoprazole (PROTONIX) 40 MG tablet Take 1 tablet (40 mg total) by mouth once daily. 90 tablet 4    sertraline (ZOLOFT) 100 MG tablet TAKE 1 TABLET BY MOUTH ONCE DAILY 90 tablet 4    VITAMIN D2 1,250 mcg (50,000 unit) capsule TAKE 1 CAPSULE BY MOUTH  EVERY 7 DAYS 4 capsule 11     No current facility-administered medications for this visit.       Review of patient's allergies indicates:   Allergen Reactions    Escitalopram oxalate Other (See Comments) and Palpitations       Physical examination  Vitals Reviewed\    Gen. Well-dressed well-nourished   Skin warm dry and intact.  No rashes noted.  Chest.  Respirations are even unlabored.  No cough. No evidence of SOB patient speaking in full sentences.   Neuro. Awake alert oriented x4.  Normal judgment and cognition noted.       Call or return to clinic prn if these symptoms worsen or  fail to improve as anticipated.

## 2022-05-12 DIAGNOSIS — Z79.899 ENCOUNTER FOR LONG-TERM (CURRENT) USE OF MEDICATIONS: ICD-10-CM

## 2022-05-12 RX ORDER — AZELASTINE 1 MG/ML
1 SPRAY, METERED NASAL 2 TIMES DAILY
Qty: 30 ML | Refills: 3 | Status: SHIPPED | OUTPATIENT
Start: 2022-05-12 | End: 2022-09-09 | Stop reason: SDUPTHER

## 2022-05-12 NOTE — TELEPHONE ENCOUNTER
----- Message from Madison Sprague sent at 5/12/2022  2:48 PM CDT -----  Contact: Torrie[facility]  Type:  Pharmacy Calling to Clarify an RX    Name of Caller: Torrie  Pharmacy Name:   [facility] - [place], [place] - [address] AT Portal to Registered [place] [address]  [facility] [address]  Phone: [phone] Fax: [phone]  Prescription Name: Azelastine 0.1%  What do they need to clarify?: New prescription is needed for a refill needed  Best Call Back Number: Please call her at [phone] opt 2 with ref# [id number]  Additional Information:

## 2022-05-12 NOTE — TELEPHONE ENCOUNTER
Care Due:                  Date            Visit Type   Department     Provider  --------------------------------------------------------------------------------                                ESTABLISHED                              PATIENT -    [facility] FAMILY  Last Visit: 08-      [facility]       Randal  Shlomo  Next Visit: None Scheduled  None         None Found                                                            Last  Test          Frequency    Reason                     Performed    Due Date  --------------------------------------------------------------------------------    Office Visit  12 months..  benazepril-hydrochlorthia  08- 07-                             zide, pantoprazole,                             sertraline...............    CMP.........  12 months..  benazepril-hydrochlorthia  01- 01-                             dion.....................    [employer] Embedded Care Gaps. Reference number: [id number]. 5/12/2022   2:58:49 PM CDT

## 2022-05-31 ENCOUNTER — PATIENT MESSAGE (OUTPATIENT)
Dept: FAMILY MEDICINE | Facility: CLINIC | Age: 37
End: 2022-05-31

## 2022-07-19 ENCOUNTER — PATIENT MESSAGE (OUTPATIENT)
Dept: FAMILY MEDICINE | Facility: CLINIC | Age: 37
End: 2022-07-19

## 2022-09-09 ENCOUNTER — HOSPITAL ENCOUNTER (OUTPATIENT)
Dept: RADIOLOGY | Facility: HOSPITAL | Age: 37
Discharge: HOME | End: 2022-09-09
Attending: FAMILY MEDICINE
Payer: COMMERCIAL

## 2022-09-09 ENCOUNTER — OFFICE VISIT (OUTPATIENT)
Dept: FAMILY MEDICINE | Facility: CLINIC | Age: 37
End: 2022-09-09
Payer: COMMERCIAL

## 2022-09-09 VITALS
OXYGEN SATURATION: 99 % | HEART RATE: 70 BPM | DIASTOLIC BLOOD PRESSURE: 84 MMHG | SYSTOLIC BLOOD PRESSURE: 136 MMHG | BODY MASS INDEX: 41.66 KG/M2 | TEMPERATURE: 98 F | WEIGHT: 291 LBS | HEIGHT: 70 IN

## 2022-09-09 DIAGNOSIS — E34.9 HYPOTESTOSTERONEMIA: Chronic | ICD-10-CM

## 2022-09-09 DIAGNOSIS — R91.1 LUNG NODULE: ICD-10-CM

## 2022-09-09 DIAGNOSIS — F33.0 MILD EPISODE OF RECURRENT MAJOR DEPRESSIVE DISORDER: Chronic | ICD-10-CM

## 2022-09-09 DIAGNOSIS — E55.9 VITAMIN D DEFICIENCY: ICD-10-CM

## 2022-09-09 DIAGNOSIS — Z79.899 ENCOUNTER FOR LONG-TERM (CURRENT) USE OF MEDICATIONS: ICD-10-CM

## 2022-09-09 DIAGNOSIS — K21.9 GASTROESOPHAGEAL REFLUX DISEASE, UNSPECIFIED WHETHER ESOPHAGITIS PRESENT: ICD-10-CM

## 2022-09-09 DIAGNOSIS — I10 HYPERTENSION, ESSENTIAL: ICD-10-CM

## 2022-09-09 DIAGNOSIS — E78.2 MIXED HYPERLIPIDEMIA: ICD-10-CM

## 2022-09-09 DIAGNOSIS — Z23 NEED FOR VACCINATION: ICD-10-CM

## 2022-09-09 DIAGNOSIS — Z00.00 WELL ADULT EXAM: Primary | ICD-10-CM

## 2022-09-09 PROCEDURE — 71046 X-RAY EXAM CHEST 2 VIEWS: CPT | Mod: TC,PO

## 2022-09-09 PROCEDURE — 3075F PR MOST RECENT SYSTOLIC BLOOD PRESS GE 130-139MM HG: ICD-10-PCS | Mod: CPTII,S$GLB,, | Performed by: FAMILY MEDICINE

## 2022-09-09 PROCEDURE — 3079F DIAST BP 80-89 MM HG: CPT | Mod: CPTII,S$GLB,, | Performed by: FAMILY MEDICINE

## 2022-09-09 PROCEDURE — 71046 X-RAY EXAM CHEST 2 VIEWS: CPT | Mod: 26,,, | Performed by: RADIOLOGY

## 2022-09-09 PROCEDURE — 4010F PR ACE/ARB THEARPY RXD/TAKEN: ICD-10-PCS | Mod: CPTII,S$GLB,, | Performed by: FAMILY MEDICINE

## 2022-09-09 PROCEDURE — 3079F PR MOST RECENT DIASTOLIC BLOOD PRESSURE 80-89 MM HG: ICD-10-PCS | Mod: CPTII,S$GLB,, | Performed by: FAMILY MEDICINE

## 2022-09-09 PROCEDURE — 90715 TDAP VACCINE 7 YRS/> IM: CPT | Mod: S$GLB,,, | Performed by: FAMILY MEDICINE

## 2022-09-09 PROCEDURE — 3008F BODY MASS INDEX DOCD: CPT | Mod: CPTII,S$GLB,, | Performed by: FAMILY MEDICINE

## 2022-09-09 PROCEDURE — 1159F MED LIST DOCD IN RCRD: CPT | Mod: CPTII,S$GLB,, | Performed by: FAMILY MEDICINE

## 2022-09-09 PROCEDURE — 4010F ACE/ARB THERAPY RXD/TAKEN: CPT | Mod: CPTII,S$GLB,, | Performed by: FAMILY MEDICINE

## 2022-09-09 PROCEDURE — 3008F PR BODY MASS INDEX (BMI) DOCUMENTED: ICD-10-PCS | Mod: CPTII,S$GLB,, | Performed by: FAMILY MEDICINE

## 2022-09-09 PROCEDURE — 90471 TDAP VACCINE GREATER THAN OR EQUAL TO 7YO IM: ICD-10-PCS | Mod: S$GLB,,, | Performed by: FAMILY MEDICINE

## 2022-09-09 PROCEDURE — 3075F SYST BP GE 130 - 139MM HG: CPT | Mod: CPTII,S$GLB,, | Performed by: FAMILY MEDICINE

## 2022-09-09 PROCEDURE — 90715 TDAP VACCINE GREATER THAN OR EQUAL TO 7YO IM: ICD-10-PCS | Mod: S$GLB,,, | Performed by: FAMILY MEDICINE

## 2022-09-09 PROCEDURE — 99395 PR PREVENTIVE VISIT,EST,18-39: ICD-10-PCS | Mod: 25,S$GLB,, | Performed by: FAMILY MEDICINE

## 2022-09-09 PROCEDURE — 99999 PR PBB SHADOW E&M-EST. PATIENT-LVL V: CPT | Mod: PBBFAC,,, | Performed by: FAMILY MEDICINE

## 2022-09-09 PROCEDURE — 90471 IMMUNIZATION ADMIN: CPT | Mod: S$GLB,,, | Performed by: FAMILY MEDICINE

## 2022-09-09 PROCEDURE — 71046 XR CHEST PA AND LATERAL: ICD-10-PCS | Mod: 26,,, | Performed by: RADIOLOGY

## 2022-09-09 PROCEDURE — 99395 PREV VISIT EST AGE 18-39: CPT | Mod: 25,S$GLB,, | Performed by: FAMILY MEDICINE

## 2022-09-09 PROCEDURE — 1159F PR MEDICATION LIST DOCUMENTED IN MEDICAL RECORD: ICD-10-PCS | Mod: CPTII,S$GLB,, | Performed by: FAMILY MEDICINE

## 2022-09-09 PROCEDURE — 99999 PR PBB SHADOW E&M-EST. PATIENT-LVL V: ICD-10-PCS | Mod: PBBFAC,,, | Performed by: FAMILY MEDICINE

## 2022-09-09 RX ORDER — BENAZEPRIL/HYDROCHLOROTHIAZIDE 20 MG-25MG
1 TABLET ORAL DAILY
Qty: 90 TABLET | Refills: 3 | Status: SHIPPED | OUTPATIENT
Start: 2022-09-09 | End: 2023-09-13

## 2022-09-09 RX ORDER — AMLODIPINE BESYLATE 10 MG/1
10 TABLET ORAL DAILY
Qty: 30 TABLET | Refills: 12 | Status: SHIPPED | OUTPATIENT
Start: 2022-09-09 | End: 2023-09-13

## 2022-09-09 RX ORDER — AZELASTINE 1 MG/ML
1 SPRAY, METERED NASAL 2 TIMES DAILY
Qty: 30 ML | Refills: 3 | Status: SHIPPED | OUTPATIENT
Start: 2022-09-09 | End: 2023-09-13

## 2022-09-09 RX ORDER — SERTRALINE HYDROCHLORIDE 100 MG/1
100 TABLET, FILM COATED ORAL DAILY
Qty: 90 TABLET | Refills: 4 | Status: SHIPPED | OUTPATIENT
Start: 2022-09-09 | End: 2023-09-13

## 2022-09-09 RX ORDER — FAMOTIDINE 20 MG/1
TABLET, FILM COATED ORAL
COMMUNITY
Start: 2022-01-03

## 2022-09-09 RX ORDER — ATORVASTATIN CALCIUM 40 MG/1
40 TABLET, FILM COATED ORAL DAILY
Qty: 90 TABLET | Refills: 3 | Status: SHIPPED | OUTPATIENT
Start: 2022-09-09 | End: 2023-09-13

## 2022-09-09 RX ORDER — PANTOPRAZOLE SODIUM 40 MG/1
40 TABLET, DELAYED RELEASE ORAL DAILY
Qty: 90 TABLET | Refills: 4 | Status: SHIPPED | OUTPATIENT
Start: 2022-09-09 | End: 2023-11-15 | Stop reason: SDUPTHER

## 2022-09-09 RX ORDER — METOPROLOL TARTRATE 100 MG/1
100 TABLET ORAL 2 TIMES DAILY
Qty: 180 TABLET | Refills: 3 | Status: SHIPPED | OUTPATIENT
Start: 2022-09-09 | End: 2023-09-13

## 2022-09-09 RX ORDER — ASPIRIN 325 MG
TABLET, DELAYED RELEASE (ENTERIC COATED) ORAL
COMMUNITY
Start: 2022-02-21

## 2022-09-09 NOTE — PROGRESS NOTES
This note is specifically for wellness visit performed today.   WELLNESS EXAM    Patient ID: Karlos Clark is a 37 y.o. male.  has a past medical history of Benign essential hypertension (2/20/2013), Depression (12/7/2011), Encounter for long-term (current) use of medications (7/23/2019), GERD (gastroesophageal reflux disease), Hypercholesterolemia (2/20/2013), Hypertension (12/7/2011), Hypotestosteronemia (8/15/2019), Obesity (12/7/2011), and Vitamin D deficiency (8/15/2019).   Chief Complaint:  Encounter for wellness exam    Well Adult Physical: Patient here for a comprehensive physical exam.The patient reports chronic problems.  The patient's last visit with me was on 8/5/2021.    Reviewed Care team:Cardiology Dr. Samy A [facility]  On TR T. Check labs every six months.  ENT Darrin Ravi  Urology Barbara Hinkle NP    September 2022:  Patient reports that he is doing well since our last visit.  He is now working for the [employer].     Patient is concerned about a nodule that was supposedly seen on a chest x-ray at Urgent Care.  Patient has not followed since then.  Patient has seen ENT for sinus issues.    Atrial fibrillation: Follows with Cardiology.        Hypertension:   CHRONIC. STABLE. BP Reviewed.  Compliant with BP medications. No SE reported.   (-) CP, SOB, palpitations, dizziness, lightheadedness, HA, arm numbness, tingling or weakness, syncope.  Creatinine   Date Value Ref Range Status   01/29/2021 0.95 0.60 - 1.35 mg/dL Final      hyperlipidemia:  CHRONIC. STABLE. Lab analysis reviewed.   (-) CP, SOB, abdominal pain, N/V/D, constipation, jaundice, skin changes.  (-) Myalgias  Lab Results   Component Value Date    CHOL 129 01/29/2021    CHOL 133 04/30/2020    CHOL 194 01/10/2020     Lab Results   Component Value Date    HDL 36 (L) 01/29/2021    HDL 34 (L) 04/30/2020    HDL 41 01/10/2020     Lab Results   Component Value Date    LDLCALC 74 01/29/2021    LDLCALC 77.8 04/30/2020    LDLCALC 118.6  01/10/2020     Lab Results   Component Value Date    TRIG 111 01/29/2021    TRIG 106 04/30/2020    TRIG 172 (H) 01/10/2020     Lab Results   Component Value Date    CHOLHDL 3.6 01/29/2021    CHOLHDL 25.6 04/30/2020    CHOLHDL 21.1 01/10/2020     Lab Results   Component Value Date    TOTALCHOLEST 3.9 04/30/2020    TOTALCHOLEST 4.7 01/10/2020    TOTALCHOLEST 4.9 09/06/2018     Lab Results   Component Value Date    ALT 19 01/29/2021    AST 16 01/29/2021    ALKPHOS 67 04/30/2020    BILITOT 0.6 01/29/2021     ======================================================  GERD:  Chronic.  Stable.  Compliant with pantoprazole 40 milligrams daily.  No side effects reported.  Mood:  Chronic.  Stable.  Compliant with Zoloft 100 milligrams daily.    Do you take any herbs or supplements that were not prescribed by a doctor? no   Are you taking calcium supplements? no      History:  Previously on  Hypogonadism on TRT UROLOGIST:  None but would like to consult with Urology for further testosterone placement therapy.  Patient has been having issues with self injections into the gluteal region with bruising and swelling.    Date last PSA: The natural history of prostate cancer and ongoing controversy regarding screening and potential treatment outcomes of prostate cancer has been discussed with the patient. The meaning of a false positive PSA and a false negative PSA has been discussed. He indicates understanding of the limitations of this screening test and wishes  to proceed with screening PSA testing.    Lab Results   Component Value Date    PSA 0.95 01/10/2020      Lab Results   Component Value Date    TESTOSTERONE 258 08/03/2021      Testosterone (ng/dL)   Date Value   07/24/2019 221 (L)     TESTOSTERONE, TOTAL, MALE (ng/dL)   Date Value   08/03/2021 258     Testosterone, Total (ng/dL)   Date Value   04/30/2020 304      Colon cancer screening:  Not indicated  Health Maintenance Topics with due status: Not Due       Topic Last  Completion Date    Lipid Panel 01/29/2021    TETANUS VACCINE 09/09/2022      ==============================================  History reviewed.   Health Maintenance Due   Topic Date Due    Pneumococcal Vaccines (Age 0-64) (2 - PCV) 12/27/2018    COVID-19 Vaccine (2 - Booster for Kathleen series) 05/06/2021    Influenza Vaccine (1) 09/01/2022       Past Medical History:  Past Medical History:   Diagnosis Date    Benign essential hypertension 2/20/2013    Depression 12/7/2011    Encounter for long-term (current) use of medications 7/23/2019 07/23/2019  Patient is on CHRONIC long-term drug therapy for managed conditions. See medication list. Reports compliance.  No side effects reported.  Routine lab work is being monitored.  Patient does  need refills today.   Lab Results Component Value Date  WBC 6.34 09/06/2018  HGB 14.1 09/06/2018  HCT 42.6 09/06/2018  MCV 86 09/06/2018   09/06/2018   CMP Sodium Date Value Ref Range Status     GERD (gastroesophageal reflux disease)     Hypercholesterolemia 2/20/2013    Chronic.  Uncontrolled.  Patient not on medications currently.  Patient has been on lovastatin in the past.  Not adherent to diet.  Lab Results Component Value Date  CHOL 213 (H) 07/24/2019  CHOL 162 09/06/2018  CHOL 181 04/20/2018  Lab Results Component Value Date  HDL 43 07/24/2019  HDL 33 (L) 09/06/2018  HDL 37 (L) 04/20/2018  Lab Results Component Value Date  LDLCALC 139 (H) 07/24/2019  LDLCAL    Hypertension 12/7/2011    Hypotestosteronemia 8/15/2019    Chronic .  Untreated. Lab Results Component Value Date  TESTOSTERONE 221 (L) 07/24/2019  Patient interested in starting replacement.    Obesity 12/7/2011    Vitamin D deficiency 8/15/2019    Chronic.  Untreated.  Patient taking vitamin-D supplementation.  Last vitamin-D level was low.     Past Surgical History:   Procedure Laterality Date    bladder      Pt was having bladder control problems.  Bladder was stretched approx 5 yrs old    SINUS SURGERY        Review of patient's allergies indicates:   Allergen Reactions    Escitalopram oxalate Other (See Comments) and Palpitations     Current Outpatient Medications on File Prior to Visit   Medication Sig Dispense Refill    aspirin (ECOTRIN) 325 MG EC tablet       cetirizine (ZYRTEC) 10 MG tablet Take 10 mg by mouth once daily.      famotidine (PEPCID) 20 MG tablet       [DISCONTINUED] amLODIPine (NORVASC) 10 MG tablet TAKE 1 TABLET BY MOUTH ONCE DAILY 30 tablet 12    [DISCONTINUED] atorvastatin (LIPITOR) 40 MG tablet TAKE 1 TABLET BY MOUTH ONCE DAILY 90 tablet 3    [DISCONTINUED] azelastine (ASTELIN) 137 mcg (0.1 %) nasal spray 1 spray (137 mcg total) by Nasal route 2 (two) times daily. 30 mL 3    [DISCONTINUED] metoprolol tartrate (LOPRESSOR) 100 MG tablet TAKE 1 TABLET BY MOUTH  TWICE DAILY 180 tablet 3    [DISCONTINUED] pantoprazole (PROTONIX) 40 MG tablet Take 1 tablet (40 mg total) by mouth once daily. 90 tablet 4    [DISCONTINUED] sertraline (ZOLOFT) 100 MG tablet TAKE 1 TABLET BY MOUTH ONCE DAILY 90 tablet 4    [DISCONTINUED] benazepril-hydrochlorthiazide (LOTENSIN HCT) 20-25 mg Tab TAKE 1 TABLET BY MOUTH ONCE DAILY 90 tablet 3    [DISCONTINUED] fluticasone propionate (FLONASE) 50 mcg/actuation nasal spray       [DISCONTINUED] VITAMIN D2 1,250 mcg (50,000 unit) capsule TAKE 1 CAPSULE BY MOUTH  EVERY 7 DAYS 4 capsule 11     No current facility-administered medications on file prior to visit.     Social History     Socioeconomic History    Marital status: Single   Tobacco Use    Smoking status: Never    Smokeless tobacco: Never   Substance and Sexual Activity    Alcohol use: Yes     Alcohol/week: 4.0 standard drinks     Types: 4 Drinks containing 0.5 oz of alcohol per week     Comment: 2 drinks per month    Drug use: No    Sexual activity: Not Currently     Partners: Female     Birth control/protection: Condom     Social Determinants of Health     Financial Resource Strain: Low Risk     Difficulty of Paying  Living Expenses: Not very hard   Food Insecurity: No Food Insecurity    Worried About Running Out of Food in the Last Year: Never true    Ran Out of Food in the Last Year: Never true   Transportation Needs: No Transportation Needs    Lack of Transportation (Medical): No    Lack of Transportation (Non-Medical): No   Physical Activity: Insufficiently Active    Days of Exercise per Week: 3 days    Minutes of Exercise per Session: 30 min   Stress: Stress Concern Present    Feeling of Stress : To some extent   Social Connections: Unknown    Frequency of Communication with Friends and Family: Twice a week    Frequency of Social Gatherings with Friends and Family: Three times a week    Active Member of Clubs or Organizations: No    Attends Club or Organization Meetings: Never    Marital Status: Never    Housing Stability: Low Risk     Unable to Pay for Housing in the Last Year: No    Number of Places Lived in the Last Year: 1    Unstable Housing in the Last Year: No     Family History   Problem Relation Age of Onset    Hypertension Mother     Arthritis Mother     Depression Mother     Hyperlipidemia Mother     COPD Father     Hypertension Father     Alcohol abuse Father     Arthritis Father     Cancer Father         Prostate cancer    Depression Father     Hearing loss Father     Heart disease Father     Hyperlipidemia Father     No Known Problems Sister     Arthritis Maternal Grandfather     Cancer Maternal Grandfather         Prostate cancer    Depression Maternal Grandfather     Diabetes Maternal Grandfather     Heart disease Maternal Grandfather     Hyperlipidemia Maternal Grandfather     Hypertension Maternal Grandfather     Kidney disease Maternal Grandfather     Stroke Maternal Grandfather     Arthritis Paternal Grandmother     Depression Paternal Grandmother     Heart disease Paternal Grandmother     Hyperlipidemia Paternal Grandmother     Hypertension Paternal Grandmother     COPD Paternal Grandfather          Prostate cancer    Depression Paternal Grandfather     Allergic rhinitis Neg Hx     Allergies Neg Hx     Angioedema Neg Hx     Asthma Neg Hx     Atopy Neg Hx     Eczema Neg Hx     Immunodeficiency Neg Hx     Rhinitis Neg Hx     Urticaria Neg Hx     Glaucoma Neg Hx        Review of Systems   Constitutional:  Negative for activity change and unexpected weight change.   HENT:  Negative for hearing loss, rhinorrhea and trouble swallowing.    Eyes:  Negative for discharge and visual disturbance.   Respiratory:  Negative for chest tightness and wheezing.    Cardiovascular:  Negative for chest pain and palpitations.   Gastrointestinal:  Negative for blood in stool, constipation, diarrhea and vomiting.   Endocrine: Negative for polydipsia and polyuria.   Genitourinary:  Negative for difficulty urinating and hematuria.   Musculoskeletal:  Negative for arthralgias, joint swelling and neck pain.   Neurological:  Negative for weakness and headaches.   Psychiatric/Behavioral:  Negative for confusion and dysphoric mood.       Objective:     Vitals:    09/09/22 1502   BP: 136/84   Pulse: 70   Temp: 97.8 °F (36.6 °C)    Body mass index is 41.75 kg/m².  Physical Exam  Constitutional:       General: He is not in acute distress.     Appearance: He is well-developed. He is not diaphoretic.   HENT:      Head: Normocephalic and atraumatic.   Eyes:      Extraocular Movements: Extraocular movements intact.      Pupils: Pupils are equal, round, and reactive to light.   Pulmonary:      Effort: Pulmonary effort is normal.   Chest:          Comments: Small mobile rubbery soft tissue mass  Abdominal:      General: Bowel sounds are normal.      Palpations: Abdomen is soft.   Musculoskeletal:         General: Normal range of motion.      Cervical back: Normal range of motion.   Skin:     Findings: No rash.   Neurological:      Mental Status: He is alert and oriented to person, place, and time.   Psychiatric:         Attention and Perception: He  is attentive.         Mood and Affect: Mood normal. Mood is not anxious or depressed. Affect is not labile, blunt, angry or inappropriate.         Speech: He is communicative. Speech is not rapid and pressured, delayed, slurred or tangential.         Behavior: Behavior normal. Behavior is not agitated, slowed, aggressive, withdrawn, hyperactive or combative.         Thought Content: Thought content normal. Thought content is not paranoid or delusional. Thought content does not include homicidal or suicidal ideation. Thought content does not include homicidal or suicidal plan.         Cognition and Memory: Memory is not impaired.         Judgment: Judgment normal. Judgment is not impulsive or inappropriate.        Orders Only on 08/02/2021   Component Date Value Ref Range Status    TESTOSTERONE, TOTAL, MALE 08/03/2021 258  250 - 827 ng/dL Final        Assessment / Plan:    1.  Routine health exam-patient here for annual wellness exam.  Labs ordered.  Health maintenance was reviewed and ordered.  Anticipatory guidance: Don't smoke.  Healthy diet and regular exercise recommended. Vaccine recommendations discussed.  See orders.  Reviewed Anticipatory guidance, risk factor reduction interventions or counseling as needed, Complete history , physical was completed today.  Complete and thorough medication reconciliation was performed.  Discussed risks and benefits of medications.  Advised patient on orders and health maintenance.  We discussed old records and old labs if available.  Will request any records not available through epic.  Continue current medications listed on your summary sheet.  All questions were answered. Patient had no further concerns. Advised of diagnoses and plan. Follow up as planned or return sooner if symptoms persist or worsen.     Testosterone replacement therapy:  Referral placed to Urology for further evaluation and management.  Discussed risks and benefits of testosterone replacement  therapy.    Hypertension:  Continue current regimen.  Counseled on importance of hypertension disease course, I recommend ongoing Education for DASH-diet and exercise.  Counseled on medication regimen importance of treating high blood pressure.  Please be advised of risk of untreated blood pressure as discussed.  Please advised of ER precautions were given for symptoms of hypertensive urgency and emergency.  Hyperlipidemia:  Continue atorvastatin.  Counseled on hyperlipidemia disease course, healthy diet and increased need for exercise.  Please be advised of the risk of cardiovascular disease, increase stroke and heart attack risk with uncontrolled/untreated hyperlipidemia.     Patient voiced understanding and understood the treatment plan. All questions were answered.   GERD RECOMMENDATIONS  Please be advised of condition course.  - Take PPI in the morning 30-60 minutes before breakfast  - I recommend ongoing Education for lifestyle modifications to help control/reduce reflux/abdominal pain including: avoid large meals, avoid eating within 2-3 hours of bedtime (avoid late night eating & lying down soon after eating), elevate head of bed if nocturnal symptoms are present, smoking cessation (if current smoker), & weight loss (if overweight).   - please be advised to avoid known foods which trigger reflux symptoms & to minimize/avoid high-fat foods, chocolate, caffeine, citrus, alcohol, & tomato products.  - Advised to avoid/limit use of NSAID's, since they can cause GI upset, bleeding, and/or ulcers. If needed, take with food.   Depression: Continue Zoloft 100 milligram.  Please be advised of condition course.  SNRI/SSRI is first-line treatment for this condition.  Please be advised of the risk of discontinuing this medication without tapering/contacting MD.  Patient has been advised of side effects, and all questions were answered.  Patient voiced understanding.  Patient will follow up routinely and notify us if  having any side effects or worsening or persistent symptoms.  ER precautions were given. Antidepressant/Antianxiety Medication Initiation:  Patient informed of risks, benefits, and potential side effects of medication and accepts informed consent.  Common side effects include nausea, fatigue, headache, insomnia, etc see medication insert for complete side effect profile.  Most importantly be advised of the possibility of new or worsening suicidal thoughts/depression/anxiety etcetera.  Please be advised to stop the medication immediately and seek urgent treatment if this occurs.  Therefore please do not to abruptly discontinue medication without physician guidance except in cases of sudden onset or worsening of SI.   BMI improving with weight loss.  ?  Pulmonary nodule:  Patient would like to start with chest x-ray which I believe is reasonable.  Requesting records from urgent care I do not have previous imaging.  If any symptoms or concerns will get CT of the chest.  Orders Placed This Encounter   Procedures    X-Ray Chest PA And Lateral     Standing Status:   Future     Number of Occurrences:   1     Standing Expiration Date:   9/9/2023     Order Specific Question:   May the Radiologist modify the order per protocol to meet the clinical needs of the patient?     Answer:   Yes     Order Specific Question:   Release to patient     Answer:   Immediate    (In Office Administered) Tdap Vaccine    CBC Without Differential     Standing Status:   Future     Number of Occurrences:   1     Standing Expiration Date:   11/8/2023    Comprehensive Metabolic Panel     Standing Status:   Future     Number of Occurrences:   1     Standing Expiration Date:   11/8/2023    TSH     Standing Status:   Future     Number of Occurrences:   1     Standing Expiration Date:   11/8/2023    Hemoglobin A1C     Standing Status:   Future     Number of Occurrences:   1     Standing Expiration Date:   11/8/2023    Lipid Panel     Standing Status:    Future     Number of Occurrences:   1     Standing Expiration Date:   2023    Urinalysis     Standing Status:   Future     Standing Expiration Date:   2023       Medications Ordered This Encounter   Medications    amLODIPine (NORVASC) 10 MG tablet     Sig: Take 1 tablet (10 mg total) by mouth once daily.     Dispense:  30 tablet     Refill:  12     Requesting 1 year supply    atorvastatin (LIPITOR) 40 MG tablet     Sig: Take 1 tablet (40 mg total) by mouth once daily.     Dispense:  90 tablet     Refill:  3     Requesting 1 year supply    azelastine (ASTELIN) 137 mcg (0.1 %) nasal spray     Si spray (137 mcg total) by Nasal route 2 (two) times daily.     Dispense:  30 mL     Refill:  3    benazepril-hydrochlorthiazide (LOTENSIN HCT) 20-25 mg Tab     Sig: Take 1 tablet by mouth once daily.     Dispense:  90 tablet     Refill:  3     Requesting 1 year supply    metoprolol tartrate (LOPRESSOR) 100 MG tablet     Sig: Take 1 tablet (100 mg total) by mouth 2 (two) times daily.     Dispense:  180 tablet     Refill:  3     Requesting 1 year supply    pantoprazole (PROTONIX) 40 MG tablet     Sig: Take 1 tablet (40 mg total) by mouth once daily.     Dispense:  90 tablet     Refill:  4     Please inactivate all prior scripts with same name and strength including on holds.    sertraline (ZOLOFT) 100 MG tablet     Sig: Take 1 tablet (100 mg total) by mouth once daily.     Dispense:  90 tablet     Refill:  4     Requesting 1 year supply      Future Appointments       Date Provider Specialty Appt Notes    2022  Radiology     2022 Randal Keenan MD Family Medicine Annual visit           Randal Keenan MD

## 2022-09-09 NOTE — PATIENT INSTRUCTIONS
Follow up in about 1 year (around 9/9/2023), or if symptoms worsen or fail to improve, for Annual Wellness Exam.     Dear patient,   As a result of recent federal legislation (The Federal Cures Act), you may receive lab or pathology results from your visit in your [facility] account before your physician is able to contact you. Your physician or their representative will relay the results to you with their recommendations at their soonest availability.     If no improvement in symptoms or symptoms worsen, please be advised to call MD, follow-up at clinic and/or go to ER if becomes severe.    Randal Keenan M.D.        We Offer TELEHEALTH & Same Day Appointments!   Book your Telehealth appointment with me through my nurse or   Clinic appointments on [employer]!    [address] [place] [address]    Office: [phone]   FAX: [phone]    Check out my Facebook Page and Follow Me at: [email or web address]    Check out my website at [employer] by clicking on: [email or web address]    To Schedule appointments online, go to [employer]: [email or web address]

## 2022-09-10 NOTE — PROGRESS NOTES
The patient has a normal CXR.  No lesions were found and no fluid was noted.  The heart appears normal.    Please call patient with these normal results if they are not enrolled with my chart.

## 2022-11-30 LAB
ALBUMIN SERPL-MCNC: 4.1 G/DL (ref 3.6–5.1)
ALBUMIN/GLOB SERPL: 1.8 (CALC) (ref 1–2.5)
ALP SERPL-CCNC: 74 U/L (ref 36–130)
ALT SERPL-CCNC: 57 U/L (ref 9–46)
AST SERPL-CCNC: 45 U/L (ref 10–40)
BILIRUB SERPL-MCNC: 0.6 MG/DL (ref 0.2–1.2)
BUN SERPL-MCNC: 13 MG/DL (ref 7–25)
BUN/CREAT SERPL: ABNORMAL (CALC) (ref 6–22)
CALCIUM SERPL-MCNC: 8.6 MG/DL (ref 8.6–10.3)
CHLORIDE SERPL-SCNC: 107 MMOL/L (ref 98–110)
CHOLEST SERPL-MCNC: 112 MG/DL
CHOLEST/HDLC SERPL: 4.1 (CALC)
CO2 SERPL-SCNC: 27 MMOL/L (ref 20–32)
CREAT SERPL-MCNC: 0.72 MG/DL (ref 0.6–1.26)
EGFR: 122 ML/MIN/1.73M2
ERYTHROCYTE [DISTWIDTH] IN BLOOD BY AUTOMATED COUNT: 13 % (ref 11–15)
GLOBULIN SER CALC-MCNC: 2.3 G/DL (CALC) (ref 1.9–3.7)
GLUCOSE SERPL-MCNC: 87 MG/DL (ref 65–99)
HBA1C MFR BLD: 5.2 % OF TOTAL HGB
HCT VFR BLD AUTO: 40.7 % (ref 38.5–50)
HDLC SERPL-MCNC: 27 MG/DL
HGB BLD-MCNC: 13.7 G/DL (ref 13.2–17.1)
LDLC SERPL CALC-MCNC: 61 MG/DL (CALC)
MCH RBC QN AUTO: 29.1 PG (ref 27–33)
MCHC RBC AUTO-ENTMCNC: 33.7 G/DL (ref 32–36)
MCV RBC AUTO: 86.4 FL (ref 80–100)
NONHDLC SERPL-MCNC: 85 MG/DL (CALC)
PLATELET # BLD AUTO: 121 THOUSAND/UL (ref 140–400)
PMV BLD REES-ECKER: 10.7 FL (ref 7.5–12.5)
POTASSIUM SERPL-SCNC: 3.4 MMOL/L (ref 3.5–5.3)
PROT SERPL-MCNC: 6.4 G/DL (ref 6.1–8.1)
RBC # BLD AUTO: 4.71 MILLION/UL (ref 4.2–5.8)
SODIUM SERPL-SCNC: 141 MMOL/L (ref 135–146)
TRIGL SERPL-MCNC: 161 MG/DL
TSH SERPL-ACNC: 1.13 MIU/L (ref 0.4–4.5)
WBC # BLD AUTO: 4.8 THOUSAND/UL (ref 3.8–10.8)

## 2022-12-02 ENCOUNTER — OFFICE VISIT (OUTPATIENT)
Dept: FAMILY MEDICINE | Facility: CLINIC | Age: 37
End: 2022-12-02
Payer: COMMERCIAL

## 2022-12-02 VITALS
OXYGEN SATURATION: 97 % | HEART RATE: 90 BPM | WEIGHT: 288.81 LBS | BODY MASS INDEX: 41.35 KG/M2 | TEMPERATURE: 98 F | HEIGHT: 70 IN | DIASTOLIC BLOOD PRESSURE: 78 MMHG | SYSTOLIC BLOOD PRESSURE: 112 MMHG | RESPIRATION RATE: 16 BRPM

## 2022-12-02 DIAGNOSIS — Z79.899 ENCOUNTER FOR LONG-TERM (CURRENT) USE OF MEDICATIONS: ICD-10-CM

## 2022-12-02 DIAGNOSIS — I10 HYPERTENSION, ESSENTIAL: ICD-10-CM

## 2022-12-02 DIAGNOSIS — N20.0 RENAL CALCULUS: ICD-10-CM

## 2022-12-02 DIAGNOSIS — R74.8 ELEVATED LIVER ENZYMES: Primary | ICD-10-CM

## 2022-12-02 DIAGNOSIS — Z23 FLU VACCINE NEED: ICD-10-CM

## 2022-12-02 DIAGNOSIS — D69.6 THROMBOCYTOPENIA: ICD-10-CM

## 2022-12-02 DIAGNOSIS — E87.6 HYPOKALEMIA: ICD-10-CM

## 2022-12-02 PROCEDURE — 3008F BODY MASS INDEX DOCD: CPT | Mod: CPTII,S$GLB,, | Performed by: FAMILY MEDICINE

## 2022-12-02 PROCEDURE — 4010F ACE/ARB THERAPY RXD/TAKEN: CPT | Mod: CPTII,S$GLB,, | Performed by: FAMILY MEDICINE

## 2022-12-02 PROCEDURE — 90471 FLU VACCINE (QUAD) GREATER THAN OR EQUAL TO 3YO PRESERVATIVE FREE IM: ICD-10-PCS | Mod: S$GLB,,, | Performed by: FAMILY MEDICINE

## 2022-12-02 PROCEDURE — 90686 FLU VACCINE (QUAD) GREATER THAN OR EQUAL TO 3YO PRESERVATIVE FREE IM: ICD-10-PCS | Mod: S$GLB,,, | Performed by: FAMILY MEDICINE

## 2022-12-02 PROCEDURE — 4010F PR ACE/ARB THEARPY RXD/TAKEN: ICD-10-PCS | Mod: CPTII,S$GLB,, | Performed by: FAMILY MEDICINE

## 2022-12-02 PROCEDURE — 3044F HG A1C LEVEL LT 7.0%: CPT | Mod: CPTII,S$GLB,, | Performed by: FAMILY MEDICINE

## 2022-12-02 PROCEDURE — 3078F DIAST BP <80 MM HG: CPT | Mod: CPTII,S$GLB,, | Performed by: FAMILY MEDICINE

## 2022-12-02 PROCEDURE — 3074F PR MOST RECENT SYSTOLIC BLOOD PRESSURE < 130 MM HG: ICD-10-PCS | Mod: CPTII,S$GLB,, | Performed by: FAMILY MEDICINE

## 2022-12-02 PROCEDURE — 99214 PR OFFICE/OUTPT VISIT, EST, LEVL IV, 30-39 MIN: ICD-10-PCS | Mod: 25,S$GLB,, | Performed by: FAMILY MEDICINE

## 2022-12-02 PROCEDURE — 3074F SYST BP LT 130 MM HG: CPT | Mod: CPTII,S$GLB,, | Performed by: FAMILY MEDICINE

## 2022-12-02 PROCEDURE — 1159F PR MEDICATION LIST DOCUMENTED IN MEDICAL RECORD: ICD-10-PCS | Mod: CPTII,S$GLB,, | Performed by: FAMILY MEDICINE

## 2022-12-02 PROCEDURE — 1160F PR REVIEW ALL MEDS BY PRESCRIBER/CLIN PHARMACIST DOCUMENTED: ICD-10-PCS | Mod: CPTII,S$GLB,, | Performed by: FAMILY MEDICINE

## 2022-12-02 PROCEDURE — 90686 IIV4 VACC NO PRSV 0.5 ML IM: CPT | Mod: S$GLB,,, | Performed by: FAMILY MEDICINE

## 2022-12-02 PROCEDURE — 99214 OFFICE O/P EST MOD 30 MIN: CPT | Mod: 25,S$GLB,, | Performed by: FAMILY MEDICINE

## 2022-12-02 PROCEDURE — 99999 PR PBB SHADOW E&M-EST. PATIENT-LVL V: CPT | Mod: PBBFAC,,, | Performed by: FAMILY MEDICINE

## 2022-12-02 PROCEDURE — 90471 IMMUNIZATION ADMIN: CPT | Mod: S$GLB,,, | Performed by: FAMILY MEDICINE

## 2022-12-02 PROCEDURE — 99999 PR PBB SHADOW E&M-EST. PATIENT-LVL V: ICD-10-PCS | Mod: PBBFAC,,, | Performed by: FAMILY MEDICINE

## 2022-12-02 PROCEDURE — 1160F RVW MEDS BY RX/DR IN RCRD: CPT | Mod: CPTII,S$GLB,, | Performed by: FAMILY MEDICINE

## 2022-12-02 PROCEDURE — 1159F MED LIST DOCD IN RCRD: CPT | Mod: CPTII,S$GLB,, | Performed by: FAMILY MEDICINE

## 2022-12-02 PROCEDURE — 3078F PR MOST RECENT DIASTOLIC BLOOD PRESSURE < 80 MM HG: ICD-10-PCS | Mod: CPTII,S$GLB,, | Performed by: FAMILY MEDICINE

## 2022-12-02 PROCEDURE — 3044F PR MOST RECENT HEMOGLOBIN A1C LEVEL <7.0%: ICD-10-PCS | Mod: CPTII,S$GLB,, | Performed by: FAMILY MEDICINE

## 2022-12-02 PROCEDURE — 3008F PR BODY MASS INDEX (BMI) DOCUMENTED: ICD-10-PCS | Mod: CPTII,S$GLB,, | Performed by: FAMILY MEDICINE

## 2022-12-02 RX ORDER — FLUTICASONE PROPIONATE 50 MCG
1 SPRAY, SUSPENSION (ML) NASAL DAILY
COMMUNITY

## 2022-12-02 NOTE — ASSESSMENT & PLAN NOTE
New elevation of liver enzymes.  Low-potassium.  Otherwise stable labs.Complete history and physical was completed today.  Complete and thorough medication reconciliation was performed.  Discussed risks and benefits of medications.  Advised patient on orders and health maintenance.  We discussed old records and old labs if available.  Will request any records not available through epic.  Continue current medications listed on your summary sheet.

## 2022-12-02 NOTE — ASSESSMENT & PLAN NOTE
Recheck potassium level.  Possibly losing from hydrochlorothiazide use.  Consider supplementing if remains low.

## 2022-12-02 NOTE — PROGRESS NOTES
PLAN:      Problem List Items Addressed This Visit       Hypertension, essential (Chronic)     Counseled on importance of hypertension disease course, I recommend ongoing Education for DASH-diet and exercise.  Counseled on medication regimen importance of treating high blood pressure.  Please be advised of risk of untreated blood pressure as discussed.  Please advised of ER precautions were given for symptoms of hypertensive urgency and emergency.           Encounter for long-term (current) use of medications (Chronic)     New elevation of liver enzymes.  Low-potassium.  Otherwise stable labs.Complete history and physical was completed today.  Complete and thorough medication reconciliation was performed.  Discussed risks and benefits of medications.  Advised patient on orders and health maintenance.  We discussed old records and old labs if available.  Will request any records not available through epic.  Continue current medications listed on your summary sheet.           Relevant Medications    fluticasone propionate (FLONASE) 50 mcg/actuation nasal spray    Other Relevant Orders    Hepatic Function Panel    CBC Auto Differential    Thrombocytopenia (Chronic)     New finding of low platelets.  In the setting of elevated liver enzymes also.  Will order ultrasound of the abdomen complete to assess the liver and spleen.  Recheck labs in a few weeks.         Relevant Orders    CBC Auto Differential    US Abdomen Complete    Elevated liver enzymes - Primary (Chronic)     Patient reports that he has been drinking increased amount of loaded Ts lately.  I have advised him to decrease consumption.  Check abdominal ultrasound.  Recheck hepatic function in a few weeks.  Avoid excessive amounts of alcohol Tylenol fried fatty.  Etcetera.         Relevant Orders    Hepatic Function Panel    US Abdomen Complete    Hypokalemia     Recheck potassium level.  Possibly losing from hydrochlorothiazide use.  Consider supplementing if  remains low.         Renal calculus     Update renal ultrasound.  Follow-up with Urology as needed.         Relevant Orders    US Abdomen Complete     Other Visit Diagnoses       Flu vaccine need        Relevant Orders    Influenza - Quadrivalent (PF) (Completed)        Patient agrees to proceed with flu shot.  Future Appointments       Date Provider Specialty Appt Notes    12/2/2022 Randal Keenan MD Family Medicine Annual visit pt coming for 2           Medication Management for assessment above:   Medication List with Changes/Refills   Current Medications    AMLODIPINE (NORVASC) 10 MG TABLET    Take 1 tablet (10 mg total) by mouth once daily.    ASPIRIN (ECOTRIN) 325 MG EC TABLET        ATORVASTATIN (LIPITOR) 40 MG TABLET    Take 1 tablet (40 mg total) by mouth once daily.    AZELASTINE (ASTELIN) 137 MCG (0.1 %) NASAL SPRAY    1 spray (137 mcg total) by Nasal route 2 (two) times daily.    BENAZEPRIL-HYDROCHLORTHIAZIDE (LOTENSIN HCT) 20-25 MG TAB    Take 1 tablet by mouth once daily.    CETIRIZINE (ZYRTEC) 10 MG TABLET    Take 10 mg by mouth once daily.    FAMOTIDINE (PEPCID) 20 MG TABLET        FLUTICASONE PROPIONATE (FLONASE) 50 MCG/ACTUATION NASAL SPRAY    1 spray by Each Nostril route once daily.    METOPROLOL TARTRATE (LOPRESSOR) 100 MG TABLET    Take 1 tablet (100 mg total) by mouth 2 (two) times daily.    PANTOPRAZOLE (PROTONIX) 40 MG TABLET    Take 1 tablet (40 mg total) by mouth once daily.    SERTRALINE (ZOLOFT) 100 MG TABLET    Take 1 tablet (100 mg total) by mouth once daily.       Randal Keenan M.D.  ==========================================================================  Subjective:   Patient ID: Karlos Clark is a 37 y.o. male.  has a past medical history of Benign essential hypertension (2/20/2013), Depression (12/7/2011), Encounter for long-term (current) use of medications (7/23/2019), GERD (gastroesophageal reflux disease), Hypercholesterolemia (2/20/2013), Hypertension (12/7/2011),  Hypotestosteronemia (8/15/2019), Obesity (12/7/2011), and Vitamin D deficiency (8/15/2019).   Chief Complaint: Results and Hypertension      Problem List Items Addressed This Visit       Hypertension, essential (Chronic)    Overview     December 2022: Blood pressure is well controlled on current regimen.  Initial HPI:  Chronic.  Uncontrolled.  Patient on benazepril hydrochlorothiazide combination.  Reports compliance.  No side effects reported.  Denies any chest pain shortness of breath blurred vision.   BP Readings from Last 3 Encounters:   12/02/22 112/78   09/09/22 136/84   03/14/22 118/74     Update August 15, 2019.  Blood pressure still uncontrolled.  Patient reports compliance with medications.  Patient reports increased stress after getting of work today.  October 2019:  Chronic.  Borderline uncontrolled.  Patient on digital medicine hypertension program.  Diastolic is starting to increase.  Patient reports compliance with benazepril 20 mg and hydrochlorothiazide 25 mg.  Amlodipine 5 mg.  JANUARY 2020:  Patient recently diagnosed with paroxysmal atrial fibrillation.  Patient is now on metoprolol in addition to his other blood pressure medications.  Patient feels well.  February 2021:  Patient reports having to go up on metoprolol to 100 milligrams twice a day.  Pulse rate and diastolic blood pressure has been well controlled.  CHRONIC. STABLE. BP Reviewed.  Compliant with BP medications. No SE reported.   (-) CP, SOB, palpitations, dizziness, lightheadedness, HA, arm numbness, tingling or weakness, syncope.  Creatinine   Date Value Ref Range Status   11/29/2022 0.72 0.60 - 1.26 mg/dL Final            Current Assessment & Plan     Counseled on importance of hypertension disease course, I recommend ongoing Education for DASH-diet and exercise.  Counseled on medication regimen importance of treating high blood pressure.  Please be advised of risk of untreated blood pressure as discussed.  Please advised of ER  precautions were given for symptoms of hypertensive urgency and emergency.           Encounter for long-term (current) use of medications (Chronic)    Overview     December 2022: Reviewed labs.  New elevation of liver enzymes.  Patient reports that he has been drinking loaded Teas recently.  07/23/2019 CHRONIC long-term drug therapy for managed conditions. See medication list. Reports compliance.  No side effects reported.  Routine lab work is being monitored.  Patient does  need refills today.   October 2019:Patient is on CHRONIC long-term drug therapy for managed conditions. See medication list. Reports compliance.  No side effects reported.  Routine lab work is being monitored.  Patient does need refills today. JANUARY 2020:  CHRONIC. Stable. Compliant with medications for managed conditions. See medication list. No SE reported.   Routine lab analysis is being monitored. Refills were addressed.APRIL 2020:  Medications and labs reviewed with the patient.  Patient reports blood pressure has been averaging 120/80 on current medication regimen. CHRONIC. Stable. Compliant with medications for managed conditions. See medication list. No SE reported.   Routine lab analysis is being monitored. Refills were addressed.  Feb 2021:CHRONIC. Stable. Compliant with medications for managed conditions. See medication list. No SE reported.   Routine lab analysis is being monitored. Refills were addressed.  Lab Results   Component Value Date    WBC 4.8 11/29/2022    HGB 13.7 11/29/2022    HCT 40.7 11/29/2022    MCV 86.4 11/29/2022     (L) 11/29/2022       Chemistry        Component Value Date/Time     11/29/2022 0801    K 3.4 (L) 11/29/2022 0801     11/29/2022 0801    CO2 27 11/29/2022 0801    BUN 13 11/29/2022 0801    CREATININE 0.72 11/29/2022 0801    GLU 87 11/29/2022 0801        Component Value Date/Time    CALCIUM 8.6 11/29/2022 0801    ALKPHOS 67 04/30/2020 0827    AST 45 (H) 11/29/2022 0801    ALT 57 (H)  11/29/2022 0801    BILITOT 0.6 11/29/2022 0801    ESTGFRAFRICA 121 01/29/2021 0739    EGFRNONAA 105 01/29/2021 0739          Lab Results   Component Value Date    TSH 1.13 11/29/2022    Z5IABUG 7.8 07/24/2019    FREET4 0.79 01/20/2020    T3FREE 3.6 07/24/2019     =======================         Current Assessment & Plan     New elevation of liver enzymes.  Low-potassium.  Otherwise stable labs.Complete history and physical was completed today.  Complete and thorough medication reconciliation was performed.  Discussed risks and benefits of medications.  Advised patient on orders and health maintenance.  We discussed old records and old labs if available.  Will request any records not available through epic.  Continue current medications listed on your summary sheet.           Thrombocytopenia (Chronic)    Overview     Lab Results   Component Value Date    WBC 4.8 11/29/2022    HGB 13.7 11/29/2022    HCT 40.7 11/29/2022    MCV 86.4 11/29/2022     (L) 11/29/2022              Current Assessment & Plan     New finding of low platelets.  In the setting of elevated liver enzymes also.  Will order ultrasound of the abdomen complete to assess the liver and spleen.  Recheck labs in a few weeks.         Elevated liver enzymes - Primary (Chronic)    Overview     Lab Results   Component Value Date    ALT 57 (H) 11/29/2022    AST 45 (H) 11/29/2022    ALKPHOS 67 04/30/2020    BILITOT 0.6 11/29/2022            Current Assessment & Plan     Patient reports that he has been drinking increased amount of loaded Ts lately.  I have advised him to decrease consumption.  Check abdominal ultrasound.  Recheck hepatic function in a few weeks.  Avoid excessive amounts of alcohol Tylenol fried fatty.  Etcetera.         Hypokalemia    Overview     Lab Results   Component Value Date    K 3.4 (L) 11/29/2022   Chronic.  Patient is on hydrochlorothiazide.         Current Assessment & Plan     Recheck potassium level.  Possibly losing from  hydrochlorothiazide use.  Consider supplementing if remains low.         Renal calculus    Overview     Narrative & Impression  EXAMINATION:  US RETROPERITONEAL COMPLETE     CLINICAL HISTORY:  Calculus of kidney     TECHNIQUE:  Routine imaging of the kidneys and bladder performed.     COMPARISON:  None.     FINDINGS:  Right kidney: The right kidney measures 11.6 cm. Echotexture normal.  No mass. Possible 5.5 mm calculus within the midpole region.  No hydronephrosis.     Left kidney: The left kidney measures 12.2 cm. Echotexture normal.  No mass. No nephrolithiasis.  No hydronephrosis.     The bladder is unremarkable for distension.     Impression:     Possible nonobstructive renal calculus.        Electronically signed by: Nathan Garrett MD  Date:                                            11/09/2021  Time:                                           16:17           Exam Ended: 11/09/21 16:14                    Current Assessment & Plan     Update renal ultrasound.  Follow-up with Urology as needed.          Other Visit Diagnoses       Flu vaccine need                 Review of patient's allergies indicates:   Allergen Reactions    Escitalopram oxalate Other (See Comments) and Palpitations     Current Outpatient Medications   Medication Instructions    amLODIPine (NORVASC) 10 mg, Oral, Daily    aspirin (ECOTRIN) 325 MG EC tablet No dose, route, or frequency recorded.    atorvastatin (LIPITOR) 40 mg, Oral, Daily    azelastine (ASTELIN) 137 mcg, Nasal, 2 times daily    benazepril-hydrochlorthiazide (LOTENSIN HCT) 20-25 mg Tab 1 tablet, Oral, Daily    cetirizine (ZYRTEC) 10 mg, Oral, Daily    famotidine (PEPCID) 20 MG tablet No dose, route, or frequency recorded.    fluticasone propionate (FLONASE) 50 mcg/actuation nasal spray 1 spray, Each Nostril, Daily    metoprolol tartrate (LOPRESSOR) 100 mg, Oral, 2 times daily    pantoprazole (PROTONIX) 40 mg, Oral, Daily    sertraline (ZOLOFT) 100 mg, Oral, Daily      I have  "reviewed the PMH, social history, FamilyHx, surgical history, allergies and medications documented / confirmed by the patient at the time of this visit.  Review of Systems   Constitutional:  Negative for activity change and unexpected weight change.   HENT:  Negative for hearing loss, rhinorrhea and trouble swallowing.    Eyes:  Negative for discharge and visual disturbance.   Respiratory:  Negative for chest tightness and wheezing.    Cardiovascular:  Negative for chest pain and palpitations.   Gastrointestinal:  Negative for blood in stool, constipation, diarrhea and vomiting.   Endocrine: Negative for polydipsia and polyuria.   Genitourinary:  Negative for difficulty urinating, hematuria and urgency.   Musculoskeletal:  Negative for arthralgias, joint swelling and neck pain.   Neurological:  Negative for weakness and headaches.   Psychiatric/Behavioral:  Negative for confusion and dysphoric mood.    Objective:   /78 (BP Location: Left arm, Patient Position: Sitting, BP Method: Medium (Automatic))   Pulse 90   Temp 98.2 °F (36.8 °C)   Resp 16   Ht 5' 10" (1.778 m)   Wt 131 kg (288 lb 12.8 oz)   SpO2 97%   BMI 41.44 kg/m²   Physical Exam  Vitals and nursing note reviewed.   Constitutional:       General: He is not in acute distress.     Appearance: He is well-developed. He is not diaphoretic.   HENT:      Head: Normocephalic and atraumatic.      Right Ear: External ear normal.      Left Ear: External ear normal.      Nose: Nose normal. No rhinorrhea.   Eyes:      Extraocular Movements: Extraocular movements intact.      Pupils: Pupils are equal, round, and reactive to light.   Cardiovascular:      Rate and Rhythm: Normal rate.      Pulses: Normal pulses.   Pulmonary:      Effort: Pulmonary effort is normal. No respiratory distress.      Breath sounds: Normal breath sounds.   Abdominal:      General: Bowel sounds are normal.      Palpations: Abdomen is soft.   Musculoskeletal:         General: Normal " range of motion.      Cervical back: Normal range of motion and neck supple.   Skin:     General: Skin is warm and dry.      Capillary Refill: Capillary refill takes less than 2 seconds.      Findings: No rash.   Neurological:      General: No focal deficit present.      Mental Status: He is alert and oriented to person, place, and time.   Psychiatric:         Attention and Perception: He is attentive.         Mood and Affect: Mood normal. Mood is not anxious or depressed. Affect is not labile, blunt, angry or inappropriate.         Speech: He is communicative. Speech is not rapid and pressured, delayed, slurred or tangential.         Behavior: Behavior normal. Behavior is not agitated, slowed, aggressive, withdrawn, hyperactive or combative.         Thought Content: Thought content normal. Thought content is not paranoid or delusional. Thought content does not include homicidal or suicidal ideation. Thought content does not include homicidal or suicidal plan.         Cognition and Memory: Memory is not impaired.         Judgment: Judgment normal. Judgment is not impulsive or inappropriate.     X-Ray Chest PA And Lateral  Narrative: EXAMINATION:  XR CHEST PA AND LATERAL    CLINICAL HISTORY:  Solitary pulmonary nodule    TECHNIQUE:  PA and lateral views of the chest were performed.    COMPARISON:  01/20/2020.    FINDINGS:  The lungs are clear, with normal appearance of pulmonary vasculature and no pleural effusion or pneumothorax.    The cardiac silhouette is normal in size. The hilar and mediastinal contours are unremarkable.    Bones are intact.  Impression: No acute abnormality.    Electronically signed by: Shree Burnham  Date:    09/09/2022  Time:    15:50    There was some concern of abnormality on checks x-ray from urgent care previously.  Repeat chest x-ray here reviewed is within normal limits.    Assessment:     1. Elevated liver enzymes    2. Thrombocytopenia    3. Encounter for long-term (current) use of  medications    4. Hypertension, essential    5. Hypokalemia    6. Renal calculus    7. Flu vaccine need      MDM:   Moderate complexity.  Moderate risk.  Total time: 32 minutes.  This includes total time spent on the encounter, which includes face to face time and non-face to face time preparing to see the patient (eg, review of previous medical records, tests), Obtaining and/or reviewing separately obtained history, documenting clinical information in the electronic or other health record, independently interpreting results (not separately reported)/communicating results to the patient/family/caregiver, and/or care coordination (not separately reported).    I have Reviewed and summarized old records.  I have performed thorough medication reconciliation today and discussed risk and benefits of medications.  I have reviewed labs and discussed with patient.  All questions were answered.      I have signed for the following orders AND/OR meds.  Orders Placed This Encounter   Procedures    US Abdomen Complete     Standing Status:   Future     Standing Expiration Date:   12/2/2023     Order Specific Question:   May the Radiologist modify the order per protocol to meet the clinical needs of the patient?     Answer:   Yes     Order Specific Question:   Release to patient     Answer:   Immediate    Influenza - Quadrivalent (PF)    Hepatic Function Panel     Standing Status:   Future     Standing Expiration Date:   12/3/2023    CBC Auto Differential     Standing Status:   Future     Standing Expiration Date:   1/31/2024           Follow up if symptoms worsen or fail to improve.  Future Appointments       Date Provider Specialty Appt Notes    12/2/2022 Randal Keenan MD Family Medicine Annual visit pt coming for 2          If no improvement in symptoms or symptoms worsen, advised to call/follow-up at clinic or go to ER. Patient voiced understanding and all questions/concerns were addressed.   DISCLAIMER: This note was  compiled by using a speech recognition dictation system and therefore please be aware that typographical / speech recognition errors can and do occur.  Please contact me if you see any errors specifically.    Randal Keenan M.D.       Office: [phone] [address] [place] [address]  FAX: [phone]

## 2022-12-02 NOTE — ASSESSMENT & PLAN NOTE
New finding of low platelets.  In the setting of elevated liver enzymes also.  Will order ultrasound of the abdomen complete to assess the liver and spleen.  Recheck labs in a few weeks.

## 2022-12-02 NOTE — PATIENT INSTRUCTIONS
Follow up if symptoms worsen or fail to improve.     Dear patient,   As a result of recent federal legislation (The Federal Cures Act), you may receive lab or pathology results from your visit in your [facility] account before your physician is able to contact you. Your physician or their representative will relay the results to you with their recommendations at their soonest availability.     If no improvement in symptoms or symptoms worsen, please be advised to call MD, follow-up at clinic and/or go to ER if becomes severe.    Randal Keenan M.D.        We Offer TELEHEALTH & Same Day Appointments!   Book your Telehealth appointment with me through my nurse or   Clinic appointments on [employer]!    [address] [place] [address]    Office: [phone]   FAX: [phone]    Check out my Facebook Page and Follow Me at: [email or web address]    Check out my website at [employer] by clicking on: [email or web address]    To Schedule appointments online, go to [facility]: [email or web address]

## 2022-12-02 NOTE — ASSESSMENT & PLAN NOTE
Patient reports that he has been drinking increased amount of loaded Ts lately.  I have advised him to decrease consumption.  Check abdominal ultrasound.  Recheck hepatic function in a few weeks.  Avoid excessive amounts of alcohol Tylenol fried fatty.  Etcetera.

## 2023-02-03 ENCOUNTER — OFFICE VISIT (OUTPATIENT)
Dept: FAMILY MEDICINE | Facility: CLINIC | Age: 38
End: 2023-02-03
Payer: COMMERCIAL

## 2023-02-03 ENCOUNTER — HOSPITAL ENCOUNTER (OUTPATIENT)
Dept: RADIOLOGY | Facility: HOSPITAL | Age: 38
Discharge: HOME | End: 2023-02-03
Attending: FAMILY MEDICINE
Payer: COMMERCIAL

## 2023-02-03 VITALS
HEART RATE: 72 BPM | BODY MASS INDEX: 40.94 KG/M2 | SYSTOLIC BLOOD PRESSURE: 128 MMHG | DIASTOLIC BLOOD PRESSURE: 86 MMHG | WEIGHT: 286 LBS | OXYGEN SATURATION: 97 % | HEIGHT: 70 IN

## 2023-02-03 DIAGNOSIS — Z79.899 ENCOUNTER FOR LONG-TERM (CURRENT) USE OF MEDICATIONS: ICD-10-CM

## 2023-02-03 DIAGNOSIS — J40 BRONCHITIS: ICD-10-CM

## 2023-02-03 DIAGNOSIS — J40 BRONCHITIS: Primary | ICD-10-CM

## 2023-02-03 PROBLEM — R09.89 CHEST CONGESTION: Status: ACTIVE | Noted: 2023-02-03

## 2023-02-03 PROCEDURE — 71046 X-RAY EXAM CHEST 2 VIEWS: CPT | Mod: TC,PO

## 2023-02-03 PROCEDURE — 3074F PR MOST RECENT SYSTOLIC BLOOD PRESSURE < 130 MM HG: ICD-10-PCS | Mod: CPTII,S$GLB,, | Performed by: FAMILY MEDICINE

## 2023-02-03 PROCEDURE — 99214 OFFICE O/P EST MOD 30 MIN: CPT | Mod: S$GLB,,, | Performed by: FAMILY MEDICINE

## 2023-02-03 PROCEDURE — 1160F PR REVIEW ALL MEDS BY PRESCRIBER/CLIN PHARMACIST DOCUMENTED: ICD-10-PCS | Mod: CPTII,S$GLB,, | Performed by: FAMILY MEDICINE

## 2023-02-03 PROCEDURE — 99999 PR PBB SHADOW E&M-EST. PATIENT-LVL V: CPT | Mod: PBBFAC,,, | Performed by: FAMILY MEDICINE

## 2023-02-03 PROCEDURE — 71046 XR CHEST PA AND LATERAL: ICD-10-PCS | Mod: 26,,, | Performed by: RADIOLOGY

## 2023-02-03 PROCEDURE — 1160F RVW MEDS BY RX/DR IN RCRD: CPT | Mod: CPTII,S$GLB,, | Performed by: FAMILY MEDICINE

## 2023-02-03 PROCEDURE — 3079F PR MOST RECENT DIASTOLIC BLOOD PRESSURE 80-89 MM HG: ICD-10-PCS | Mod: CPTII,S$GLB,, | Performed by: FAMILY MEDICINE

## 2023-02-03 PROCEDURE — 3079F DIAST BP 80-89 MM HG: CPT | Mod: CPTII,S$GLB,, | Performed by: FAMILY MEDICINE

## 2023-02-03 PROCEDURE — 4010F ACE/ARB THERAPY RXD/TAKEN: CPT | Mod: CPTII,S$GLB,, | Performed by: FAMILY MEDICINE

## 2023-02-03 PROCEDURE — 1159F PR MEDICATION LIST DOCUMENTED IN MEDICAL RECORD: ICD-10-PCS | Mod: CPTII,S$GLB,, | Performed by: FAMILY MEDICINE

## 2023-02-03 PROCEDURE — 71046 X-RAY EXAM CHEST 2 VIEWS: CPT | Mod: 26,,, | Performed by: RADIOLOGY

## 2023-02-03 PROCEDURE — 1159F MED LIST DOCD IN RCRD: CPT | Mod: CPTII,S$GLB,, | Performed by: FAMILY MEDICINE

## 2023-02-03 PROCEDURE — 3008F BODY MASS INDEX DOCD: CPT | Mod: CPTII,S$GLB,, | Performed by: FAMILY MEDICINE

## 2023-02-03 PROCEDURE — 3074F SYST BP LT 130 MM HG: CPT | Mod: CPTII,S$GLB,, | Performed by: FAMILY MEDICINE

## 2023-02-03 PROCEDURE — 99214 PR OFFICE/OUTPT VISIT, EST, LEVL IV, 30-39 MIN: ICD-10-PCS | Mod: S$GLB,,, | Performed by: FAMILY MEDICINE

## 2023-02-03 PROCEDURE — 99999 PR PBB SHADOW E&M-EST. PATIENT-LVL V: ICD-10-PCS | Mod: PBBFAC,,, | Performed by: FAMILY MEDICINE

## 2023-02-03 PROCEDURE — 3008F PR BODY MASS INDEX (BMI) DOCUMENTED: ICD-10-PCS | Mod: CPTII,S$GLB,, | Performed by: FAMILY MEDICINE

## 2023-02-03 PROCEDURE — 4010F PR ACE/ARB THEARPY RXD/TAKEN: ICD-10-PCS | Mod: CPTII,S$GLB,, | Performed by: FAMILY MEDICINE

## 2023-02-03 RX ORDER — IBUPROFEN 800 MG/1
800 TABLET ORAL EVERY 8 HOURS PRN
COMMUNITY
Start: 2022-12-29

## 2023-02-03 RX ORDER — METHYLPREDNISOLONE 4 MG/1
TABLET ORAL
Qty: 21 TABLET | Refills: 0 | Status: SHIPPED | OUTPATIENT
Start: 2023-02-03 | End: 2023-11-15

## 2023-02-03 RX ORDER — ALBUTEROL SULFATE 90 UG/1
2 AEROSOL, METERED RESPIRATORY (INHALATION) EVERY 4 HOURS PRN
COMMUNITY
Start: 2023-01-27

## 2023-02-03 NOTE — ASSESSMENT & PLAN NOTE
Check chest x-ray.  If no pneumonia then I recommend finishing antibiotics and proceeding with Medrol Dosepak.  Discussed condition course and signs and symptoms to expect.  Patient advised take anti-inflammatories and or Tylenol for pain or fever.  ER precautions.  Call MD or follow-up to clinic if not improving or worsening symptoms.  Patient has albuterol on hand for cough.  Patient declines any cough syrup.

## 2023-02-03 NOTE — PROGRESS NOTES
PLAN:     avoid further antibiotics at this time.  Problem List Items Addressed This Visit       Encounter for long-term (current) use of medications (Chronic)     Complete history and physical was completed today.  Complete and thorough medication reconciliation was performed.  Discussed risks and benefits of medications.  Advised patient on orders and health maintenance.  We discussed old records and old labs if available.  Will request any records not available through epic.  Continue current medications listed on your summary sheet.           Relevant Medications    methylPREDNISolone (MEDROL DOSEPACK) 4 mg tablet    Chest congestion - Primary     Check chest x-ray.  If no pneumonia then I recommend finishing antibiotics and proceeding with Medrol Dosepak.  Discussed condition course and signs and symptoms to expect.  Patient advised take anti-inflammatories and or Tylenol for pain or fever.  ER precautions.  Call MD or follow-up to clinic if not improving or worsening symptoms.  Patient has albuterol on hand for cough.  Patient declines any cough syrup.         Relevant Medications    methylPREDNISolone (MEDROL DOSEPACK) 4 mg tablet     Future Appointments       Date Specialty Appt Notes    2/3/2023 Radiology            Medication Management for assessment above:   Medication List with Changes/Refills   New Medications    METHYLPREDNISOLONE (MEDROL DOSEPACK) 4 MG TABLET    follow package directions   Current Medications    ALBUTEROL (PROVENTIL/VENTOLIN HFA) 90 MCG/ACTUATION INHALER    Inhale 2 puffs into the lungs every 4 (four) hours as needed.    AMLODIPINE (NORVASC) 10 MG TABLET    Take 1 tablet (10 mg total) by mouth once daily.    ASPIRIN (ECOTRIN) 325 MG EC TABLET        ATORVASTATIN (LIPITOR) 40 MG TABLET    Take 1 tablet (40 mg total) by mouth once daily.    AZELASTINE (ASTELIN) 137 MCG (0.1 %) NASAL SPRAY    1 spray (137 mcg total) by Nasal route 2 (two) times daily.    BENAZEPRIL-HYDROCHLORTHIAZIDE  (LOTENSIN HCT) 20-25 MG TAB    Take 1 tablet by mouth once daily.    CETIRIZINE (ZYRTEC) 10 MG TABLET    Take 10 mg by mouth once daily.    FAMOTIDINE (PEPCID) 20 MG TABLET        FLUTICASONE PROPIONATE (FLONASE) 50 MCG/ACTUATION NASAL SPRAY    1 spray by Each Nostril route once daily.    IBUPROFEN (ADVIL,MOTRIN) 800 MG TABLET    Take 800 mg by mouth every 8 (eight) hours as needed.    METOPROLOL TARTRATE (LOPRESSOR) 100 MG TABLET    Take 1 tablet (100 mg total) by mouth 2 (two) times daily.    PANTOPRAZOLE (PROTONIX) 40 MG TABLET    Take 1 tablet (40 mg total) by mouth once daily.    SERTRALINE (ZOLOFT) 100 MG TABLET    Take 1 tablet (100 mg total) by mouth once daily.       Randal Keenan M.D.  ==========================================================================  Subjective:   Patient ID: Karlos Clark is a 37 y.o. male.  has a past medical history of Benign essential hypertension (2/20/2013), Depression (12/7/2011), Encounter for long-term (current) use of medications (7/23/2019), GERD (gastroesophageal reflux disease), Hypercholesterolemia (2/20/2013), Hypertension (12/7/2011), Hypotestosteronemia (8/15/2019), Obesity (12/7/2011), and Vitamin D deficiency (8/15/2019).   Chief Complaint: Chest Congestion    Answers submitted by the patient for this visit:  Cough Questionnaire (Submitted on 2/2/2023)  Chief Complaint: Cough  Chronicity: new  Onset: 1 to 4 weeks ago  Progression since onset: waxing and waning  Frequency: every few minutes  Cough characteristics: productive of sputum  ear congestion: No  heartburn: No  hemoptysis: No  nasal congestion: Yes  sweats: No  weight loss: No  Aggravated by: lying down  asthma: No  bronchiectasis: No  bronchitis: Yes  COPD: No  emphysema: No  pneumonia: No  Treatments tried: OTC cough suppressant, a beta-agonist inhaler, body position changes, cool air, prescription cough suppressant, rest  Improvement on treatment: mild    Problem List Items Addressed This Visit        Encounter for long-term (current) use of medications (Chronic)    Overview     February 2023: Reviewed labs.  December 2022: Reviewed labs.  New elevation of liver enzymes.  Patient reports that he has been drinking loaded Teas recently.  07/23/2019 CHRONIC long-term drug therapy for managed conditions. See medication list. Reports compliance.  No side effects reported.  Routine lab work is being monitored.  Patient does  need refills today.   October 2019:Patient is on CHRONIC long-term drug therapy for managed conditions. See medication list. Reports compliance.  No side effects reported.  Routine lab work is being monitored.  Patient does need refills today. JANUARY 2020:  CHRONIC. Stable. Compliant with medications for managed conditions. See medication list. No SE reported.   Routine lab analysis is being monitored. Refills were addressed.APRIL 2020:  Medications and labs reviewed with the patient.  Patient reports blood pressure has been averaging 120/80 on current medication regimen. CHRONIC. Stable. Compliant with medications for managed conditions. See medication list. No SE reported.   Routine lab analysis is being monitored. Refills were addressed.  Feb 2021:CHRONIC. Stable. Compliant with medications for managed conditions. See medication list. No SE reported.   Routine lab analysis is being monitored. Refills were addressed.  Lab Results   Component Value Date    WBC 4.8 11/29/2022    HGB 13.7 11/29/2022    HCT 40.7 11/29/2022    MCV 86.4 11/29/2022     (L) 11/29/2022       Chemistry        Component Value Date/Time     11/29/2022 0801    K 3.4 (L) 11/29/2022 0801     11/29/2022 0801    CO2 27 11/29/2022 0801    BUN 13 11/29/2022 0801    CREATININE 0.72 11/29/2022 0801    GLU 87 11/29/2022 0801        Component Value Date/Time    CALCIUM 8.6 11/29/2022 0801    ALKPHOS 67 04/30/2020 0827    AST 45 (H) 11/29/2022 0801    ALT 57 (H) 11/29/2022 0801    BILITOT 0.6 11/29/2022 0801     ESTGFRAFMARGOTA 121 01/29/2021 0739    EGFRNONAA 105 01/29/2021 0739          Lab Results   Component Value Date    TSH 1.13 11/29/2022    H3CQUTT 7.8 07/24/2019    FREET4 0.79 01/20/2020    T3FREE 3.6 07/24/2019          Current Assessment & Plan     Complete history and physical was completed today.  Complete and thorough medication reconciliation was performed.  Discussed risks and benefits of medications.  Advised patient on orders and health maintenance.  We discussed old records and old labs if available.  Will request any records not available through epic.  Continue current medications listed on your summary sheet.           Chest congestion - Primary    Overview     Subacute.  Patient reports that he has been having symptoms that started in his sinuses about four weeks ago.  Patient went to urgent care was treated with Augmentin.  Patient then followed up to another urgent care with coughing congestion and was put on doxycycline.  Patient reports he is finishing the 2nd course of antibiotics and is still having coughing congestion.  Patient does have a albuterol but does not use regularly.  He has not been on steroids.         Current Assessment & Plan     Check chest x-ray.  If no pneumonia then I recommend finishing antibiotics and proceeding with Medrol Dosepak.  Discussed condition course and signs and symptoms to expect.  Patient advised take anti-inflammatories and or Tylenol for pain or fever.  ER precautions.  Call MD or follow-up to clinic if not improving or worsening symptoms.  Patient has albuterol on hand for cough.  Patient declines any cough syrup.             Review of patient's allergies indicates:   Allergen Reactions    Escitalopram oxalate Other (See Comments) and Palpitations     Current Outpatient Medications   Medication Instructions    albuterol (PROVENTIL/VENTOLIN HFA) 90 mcg/actuation inhaler 2 puffs, Inhalation, Every 4 hours PRN    amLODIPine (NORVASC) 10 mg, Oral, Daily    aspirin  "(ECOTRIN) 325 MG EC tablet No dose, route, or frequency recorded.    atorvastatin (LIPITOR) 40 mg, Oral, Daily    azelastine (ASTELIN) 137 mcg, Nasal, 2 times daily    benazepril-hydrochlorthiazide (LOTENSIN HCT) 20-25 mg Tab 1 tablet, Oral, Daily    cetirizine (ZYRTEC) 10 mg, Oral, Daily    famotidine (PEPCID) 20 MG tablet No dose, route, or frequency recorded.    fluticasone propionate (FLONASE) 50 mcg/actuation nasal spray 1 spray, Each Nostril, Daily    ibuprofen (ADVIL,MOTRIN) 800 mg, Oral, Every 8 hours PRN    methylPREDNISolone (MEDROL DOSEPACK) 4 mg tablet follow package directions    metoprolol tartrate (LOPRESSOR) 100 mg, Oral, 2 times daily    pantoprazole (PROTONIX) 40 mg, Oral, Daily    sertraline (ZOLOFT) 100 mg, Oral, Daily      I have reviewed the PMH, social history, FamilyHx, surgical history, allergies and medications documented / confirmed by the patient at the time of this visit.  Review of Systems   Constitutional:  Negative for chills and fever.   HENT:  Positive for postnasal drip. Negative for ear pain, rhinorrhea and sore throat.    Respiratory:  Positive for cough, shortness of breath and wheezing.    Cardiovascular:  Negative for chest pain.   Musculoskeletal:  Negative for myalgias.   Skin:  Negative for rash.   Allergic/Immunologic: Positive for environmental allergies.   Neurological:  Positive for headaches.   Objective:   /86   Pulse 72   Ht 5' 10" (1.778 m)   Wt 129.7 kg (286 lb)   SpO2 97%   BMI 41.04 kg/m²   Physical Exam  Vitals and nursing note reviewed.   Constitutional:       General: He is not in acute distress.     Appearance: He is well-developed. He is not diaphoretic.   HENT:      Head: Normocephalic and atraumatic.      Right Ear: Tympanic membrane, ear canal and external ear normal. There is no impacted cerumen.      Left Ear: Tympanic membrane, ear canal and external ear normal. There is no impacted cerumen.      Nose: Congestion present. No rhinorrhea.      " Mouth/Throat:      Pharynx: Posterior oropharyngeal erythema present.   Eyes:      Extraocular Movements: Extraocular movements intact.      Pupils: Pupils are equal, round, and reactive to light.   Cardiovascular:      Rate and Rhythm: Normal rate.      Pulses: Normal pulses.   Pulmonary:      Effort: Pulmonary effort is normal. No respiratory distress.      Breath sounds: Normal breath sounds. No wheezing.   Abdominal:      General: Bowel sounds are normal.      Palpations: Abdomen is soft.   Musculoskeletal:         General: Normal range of motion.      Cervical back: Normal range of motion and neck supple.   Skin:     General: Skin is warm and dry.      Capillary Refill: Capillary refill takes less than 2 seconds.      Findings: No rash.   Neurological:      General: No focal deficit present.      Mental Status: He is alert and oriented to person, place, and time.      Cranial Nerves: No cranial nerve deficit.      Motor: No weakness.      Gait: Gait normal.   Psychiatric:         Attention and Perception: He is attentive.         Mood and Affect: Mood normal. Mood is not anxious or depressed. Affect is not labile, blunt, angry or inappropriate.         Speech: He is communicative. Speech is not rapid and pressured, delayed, slurred or tangential.         Behavior: Behavior normal. Behavior is not agitated, slowed, aggressive, withdrawn, hyperactive or combative.         Thought Content: Thought content normal. Thought content is not paranoid or delusional. Thought content does not include homicidal or suicidal ideation. Thought content does not include homicidal or suicidal plan.         Cognition and Memory: Memory is not impaired.         Judgment: Judgment normal. Judgment is not impulsive or inappropriate.       Assessment:     1. Bronchitis    2. Encounter for long-term (current) use of medications      MDM:   Moderate complexity.  Moderate risk.  Total time: 31 minutes.  This includes total time spent on  the encounter, which includes face to face time and non-face to face time preparing to see the patient (eg, review of previous medical records, tests), Obtaining and/or reviewing separately obtained history, documenting clinical information in the electronic or other health record, independently interpreting results (not separately reported)/communicating results to the patient/family/caregiver, and/or care coordination (not separately reported).    I have Reviewed and summarized old records.  I have performed thorough medication reconciliation today and discussed risk and benefits of medications.  I have reviewed labs and discussed with patient.  All questions were answered.  I am requesting old records and will review them once they are available.  Outside urgent care bronchitis    I have signed for the following orders AND/OR meds.  Orders Placed This Encounter   Procedures    X-Ray Chest PA And Lateral     Standing Status:   Future     Standing Expiration Date:   2/3/2024     Order Specific Question:   May the Radiologist modify the order per protocol to meet the clinical needs of the patient?     Answer:   Yes     Order Specific Question:   Release to patient     Answer:   Immediate     Medications Ordered This Encounter   Medications    methylPREDNISolone (MEDROL DOSEPACK) 4 mg tablet     Sig: follow package directions     Dispense:  21 tablet     Refill:  0        Follow up if symptoms worsen or fail to improve, for follow up.  Future Appointments       Date Specialty Appt Notes    2/3/2023 Radiology           If no improvement in symptoms or symptoms worsen, advised to call/follow-up at clinic or go to ER. Patient voiced understanding and all questions/concerns were addressed.   DISCLAIMER: This note was compiled by using a speech recognition dictation system and therefore please be aware that typographical / speech recognition errors can and do occur.  Please contact me if you see any errors  specifically.    Randal Keenan M.D.       Office: [phone] [address] [place] [address]  FAX: [phone]

## 2023-02-03 NOTE — PATIENT INSTRUCTIONS
Follow up if symptoms worsen or fail to improve, for follow up.     Dear patient,   As a result of recent federal legislation (The Federal Cures Act), you may receive lab or pathology results from your visit in your [facility] account before your physician is able to contact you. Your physician or their representative will relay the results to you with their recommendations at their soonest availability.     If no improvement in symptoms or symptoms worsen, please be advised to call MD, follow-up at clinic and/or go to ER if becomes severe.    Randal Keenan M.D.        We Offer TELEHEALTH & Same Day Appointments!   Book your Telehealth appointment with me through my nurse or   Clinic appointments on [employer]!    [address] [place] [address]    Office: [phone]   FAX: [phone]    Check out my Facebook Page and Follow Me at: [email or web address]    Check out my website at [email or web address] by clicking on: [email or web address]    To Schedule appointments online, go to [employer]: [email or web address]

## 2023-02-10 ENCOUNTER — PATIENT MESSAGE (OUTPATIENT)
Dept: FAMILY MEDICINE | Facility: CLINIC | Age: 38
End: 2023-02-10
Payer: COMMERCIAL

## 2023-02-10 RX ORDER — AZITHROMYCIN 250 MG/1
TABLET, FILM COATED ORAL
Qty: 6 TABLET | Refills: 0 | Status: SHIPPED | OUTPATIENT
Start: 2023-02-10 | End: 2023-02-15

## 2023-02-10 NOTE — TELEPHONE ENCOUNTER
I received your message which was reviewed along with the the medication list and allergies that we have below.  Please review it for accuracy to make sure that we have the most recent records on your history.     Based on this, the following orders were placed AND/OR medicines were sent in.     No orders of the defined types were placed in this encounter.      Medications written and sent at this time include:  Medications Ordered This Encounter   Medications    azithromycin (Z-ELLEN) 250 MG tablet     Sig: Take 2 tablets by mouth on day 1; Take 1 tablet by mouth on days 2-5     Dispense:  6 tablet     Refill:  0       Your pharmacy(ies) of choice at this time on record include the list below and any medications would have been sent to the one at the top.    [facility] [address] [place] [address] [address] [place] [address]  Phone: [phone] Fax: [phone]    [facility] - [place], [place] - [facility] AT [facility] [address]  Phone: [phone] Fax: [phone]      Thank you for choosing us as your healthcare provider!  Dr. Randal Keenan    ALLERGY LIST  Review of patient's allergies indicates:   Allergen Reactions    Escitalopram oxalate Other (See Comments) and Palpitations       MEDICATION LIST  Current Outpatient Medications on File Prior to Visit   Medication Sig Dispense Refill    albuterol (PROVENTIL/VENTOLIN HFA) 90 mcg/actuation inhaler Inhale 2 puffs into the lungs every 4 (four) hours as needed.      amLODIPine (NORVASC) 10 MG tablet Take 1 tablet (10 mg total) by mouth once daily. 30 tablet 12    aspirin (ECOTRIN) 325 MG EC tablet       atorvastatin (LIPITOR) 40 MG tablet Take 1 tablet (40 mg total) by mouth once daily. 90 tablet 3    azelastine (ASTELIN) 137 mcg (0.1 %) nasal spray 1 spray (137 mcg total) by Nasal route 2 (two) times daily. 30 mL 3     benazepril-hydrochlorthiazide (LOTENSIN HCT) 20-25 mg Tab Take 1 tablet by mouth once daily. 90 tablet 3    cetirizine (ZYRTEC) 10 MG tablet Take 10 mg by mouth once daily.      famotidine (PEPCID) 20 MG tablet       fluticasone propionate (FLONASE) 50 mcg/actuation nasal spray 1 spray by Each Nostril route once daily.      ibuprofen (ADVIL,MOTRIN) 800 MG tablet Take 800 mg by mouth every 8 (eight) hours as needed.      methylPREDNISolone (MEDROL DOSEPACK) 4 mg tablet follow package directions 21 tablet 0    metoprolol tartrate (LOPRESSOR) 100 MG tablet Take 1 tablet (100 mg total) by mouth 2 (two) times daily. 180 tablet 3    pantoprazole (PROTONIX) 40 MG tablet Take 1 tablet (40 mg total) by mouth once daily. 90 tablet 4    sertraline (ZOLOFT) 100 MG tablet Take 1 tablet (100 mg total) by mouth once daily. 90 tablet 4     No current facility-administered medications on file prior to visit.       HEALTH MAINTENANCE THAT IS OVERDUE OR NEEDS TO BE UPDATED ON OUR CHART IS LISTED BELOW.  IF YOU HAVE HAD IT DONE ELSEWHERE, PLEASE SEND US DATES AND RECORDS IF YOU HAVE THEM TO MAKE YOUR CHART ACCURATE.  IF YOU HAVE NOT HAD THESE DONE AND ARE READY FOR US TO SCHEDULE THEM, PLEASE SEND US A MESSAGE.  There are no preventive care reminders to display for this patient.    DISCLAIMER: This note was compiled by using a speech recognition dictation system and therefore please be aware that typographical / speech recognition errors can and do occur.  Please contact me if you see any errors specifically.    Randal Keenan MD  We Offer Telehealth & Same Day Appointments!   Book your Telehealth appointment with me through my nurse or   Clinic appointments on [employer]!  Uoumda-[phone]     Check out my Facebook Page and Follow Me at: CLICK HERE    Check out my website at [email or web address] by clicking on: CLICK HERE    To Schedule appointments online, go to [employer]: CLICK HERE     Location:  [email or web address]    [address] [facility]  [place] [address]    FAX: [phone]

## 2023-10-04 LAB
ALBUMIN SERPL-MCNC: 4.2 G/DL (ref 3.6–5.1)
ALBUMIN/GLOB SERPL: 1.8 (CALC) (ref 1–2.5)
ALP SERPL-CCNC: 64 U/L (ref 36–130)
ALT SERPL-CCNC: 22 U/L (ref 9–46)
AST SERPL-CCNC: 17 U/L (ref 10–40)
BILIRUB DIRECT SERPL-MCNC: 0.1 MG/DL
BILIRUB INDIRECT SERPL-MCNC: 0.6 MG/DL (CALC) (ref 0.2–1.2)
BILIRUB SERPL-MCNC: 0.7 MG/DL (ref 0.2–1.2)
GLOBULIN SER CALC-MCNC: 2.4 G/DL (CALC) (ref 1.9–3.7)
PROT SERPL-MCNC: 6.6 G/DL (ref 6.1–8.1)

## 2023-10-04 NOTE — PROGRESS NOTES
1st check to see if patient has seen the results.  If not then  CALL patient with results and Document verification.  Schedule follow-up if needed.  [phone]  Previous liver enzyme elevation has improved.  Liver enzymes are normal.

## 2023-10-13 DIAGNOSIS — Z79.899 ENCOUNTER FOR LONG-TERM (CURRENT) USE OF MEDICATIONS: ICD-10-CM

## 2023-10-13 DIAGNOSIS — I10 HYPERTENSION, ESSENTIAL: ICD-10-CM

## 2023-10-13 RX ORDER — BENAZEPRIL/HYDROCHLOROTHIAZIDE 20 MG-25MG
1 TABLET ORAL DAILY
Qty: 30 TABLET | Refills: 0 | Status: SHIPPED | OUTPATIENT
Start: 2023-10-13 | End: 2023-11-15 | Stop reason: SDUPTHER

## 2023-10-13 NOTE — TELEPHONE ENCOUNTER
Refill Routing Note   Medication(s) are not appropriate for processing by [facility] for the following reason(s):      Required labs abnormal (K)    [facility] action(s):  Defer Care Due:  None identified              Appointments  past 12m or future 3m with PCP    Date Provider   Last Visit   2/3/2023 Randal Keenan MD   Next Visit   11/15/2023 Randal Keenan MD   ED visits in past 90 days: 0        Note composed:2:11 PM 10/13/2023

## 2023-10-13 NOTE — TELEPHONE ENCOUNTER
No care due was identified.  [facility] Embedded Care Due Messages. Reference number: [id number].   10/13/2023 9:25:29 AM CDT

## 2023-11-15 ENCOUNTER — OFFICE VISIT (OUTPATIENT)
Dept: FAMILY MEDICINE | Facility: CLINIC | Age: 38
End: 2023-11-15
Payer: COMMERCIAL

## 2023-11-15 VITALS
BODY MASS INDEX: 44.64 KG/M2 | DIASTOLIC BLOOD PRESSURE: 69 MMHG | HEART RATE: 79 BPM | WEIGHT: 311.81 LBS | HEIGHT: 70 IN | OXYGEN SATURATION: 94 % | SYSTOLIC BLOOD PRESSURE: 100 MMHG

## 2023-11-15 DIAGNOSIS — Z00.00 WELL ADULT EXAM: Primary | ICD-10-CM

## 2023-11-15 DIAGNOSIS — Z23 NEED FOR VACCINATION: ICD-10-CM

## 2023-11-15 DIAGNOSIS — E78.2 MIXED HYPERLIPIDEMIA: ICD-10-CM

## 2023-11-15 DIAGNOSIS — F33.0 MILD EPISODE OF RECURRENT MAJOR DEPRESSIVE DISORDER: Chronic | ICD-10-CM

## 2023-11-15 DIAGNOSIS — K21.9 GASTROESOPHAGEAL REFLUX DISEASE, UNSPECIFIED WHETHER ESOPHAGITIS PRESENT: ICD-10-CM

## 2023-11-15 DIAGNOSIS — Z79.899 ENCOUNTER FOR LONG-TERM (CURRENT) USE OF MEDICATIONS: ICD-10-CM

## 2023-11-15 DIAGNOSIS — I10 HYPERTENSION, ESSENTIAL: ICD-10-CM

## 2023-11-15 PROBLEM — J35.03 CHRONIC TONSILLITIS AND ADENOIDITIS: Status: ACTIVE | Noted: 2023-10-25

## 2023-11-15 PROCEDURE — 3078F DIAST BP <80 MM HG: CPT | Mod: CPTII,S$GLB,, | Performed by: FAMILY MEDICINE

## 2023-11-15 PROCEDURE — 90686 IIV4 VACC NO PRSV 0.5 ML IM: CPT | Mod: S$GLB,,, | Performed by: FAMILY MEDICINE

## 2023-11-15 PROCEDURE — 3008F PR BODY MASS INDEX (BMI) DOCUMENTED: ICD-10-PCS | Mod: CPTII,S$GLB,, | Performed by: FAMILY MEDICINE

## 2023-11-15 PROCEDURE — 1159F PR MEDICATION LIST DOCUMENTED IN MEDICAL RECORD: ICD-10-PCS | Mod: CPTII,S$GLB,, | Performed by: FAMILY MEDICINE

## 2023-11-15 PROCEDURE — 4010F ACE/ARB THERAPY RXD/TAKEN: CPT | Mod: CPTII,S$GLB,, | Performed by: FAMILY MEDICINE

## 2023-11-15 PROCEDURE — 99395 PREV VISIT EST AGE 18-39: CPT | Mod: 25,S$GLB,, | Performed by: FAMILY MEDICINE

## 2023-11-15 PROCEDURE — 3074F SYST BP LT 130 MM HG: CPT | Mod: CPTII,S$GLB,, | Performed by: FAMILY MEDICINE

## 2023-11-15 PROCEDURE — 3078F PR MOST RECENT DIASTOLIC BLOOD PRESSURE < 80 MM HG: ICD-10-PCS | Mod: CPTII,S$GLB,, | Performed by: FAMILY MEDICINE

## 2023-11-15 PROCEDURE — 99999 PR PBB SHADOW E&M-EST. PATIENT-LVL V: CPT | Mod: PBBFAC,,, | Performed by: FAMILY MEDICINE

## 2023-11-15 PROCEDURE — 90471 FLU VACCINE (QUAD) GREATER THAN OR EQUAL TO 3YO PRESERVATIVE FREE IM: ICD-10-PCS | Mod: S$GLB,,, | Performed by: FAMILY MEDICINE

## 2023-11-15 PROCEDURE — 99395 PR PREVENTIVE VISIT,EST,18-39: ICD-10-PCS | Mod: 25,S$GLB,, | Performed by: FAMILY MEDICINE

## 2023-11-15 PROCEDURE — 1159F MED LIST DOCD IN RCRD: CPT | Mod: CPTII,S$GLB,, | Performed by: FAMILY MEDICINE

## 2023-11-15 PROCEDURE — 3074F PR MOST RECENT SYSTOLIC BLOOD PRESSURE < 130 MM HG: ICD-10-PCS | Mod: CPTII,S$GLB,, | Performed by: FAMILY MEDICINE

## 2023-11-15 PROCEDURE — 99999 PR PBB SHADOW E&M-EST. PATIENT-LVL V: ICD-10-PCS | Mod: PBBFAC,,, | Performed by: FAMILY MEDICINE

## 2023-11-15 PROCEDURE — 3008F BODY MASS INDEX DOCD: CPT | Mod: CPTII,S$GLB,, | Performed by: FAMILY MEDICINE

## 2023-11-15 PROCEDURE — 90686 FLU VACCINE (QUAD) GREATER THAN OR EQUAL TO 3YO PRESERVATIVE FREE IM: ICD-10-PCS | Mod: S$GLB,,, | Performed by: FAMILY MEDICINE

## 2023-11-15 PROCEDURE — 90471 IMMUNIZATION ADMIN: CPT | Mod: S$GLB,,, | Performed by: FAMILY MEDICINE

## 2023-11-15 PROCEDURE — 4010F PR ACE/ARB THEARPY RXD/TAKEN: ICD-10-PCS | Mod: CPTII,S$GLB,, | Performed by: FAMILY MEDICINE

## 2023-11-15 RX ORDER — BENAZEPRIL/HYDROCHLOROTHIAZIDE 20 MG-25MG
1 TABLET ORAL DAILY
Qty: 90 TABLET | Refills: 4 | Status: SHIPPED | OUTPATIENT
Start: 2023-11-15 | End: 2024-11-14

## 2023-11-15 RX ORDER — ATORVASTATIN CALCIUM 40 MG/1
40 TABLET, FILM COATED ORAL DAILY
Qty: 90 TABLET | Refills: 4 | Status: SHIPPED | OUTPATIENT
Start: 2023-11-15

## 2023-11-15 RX ORDER — AZELASTINE 1 MG/ML
1 SPRAY, METERED NASAL 2 TIMES DAILY
Qty: 90 ML | Refills: 4 | Status: SHIPPED | OUTPATIENT
Start: 2023-11-15

## 2023-11-15 RX ORDER — SERTRALINE HYDROCHLORIDE 100 MG/1
100 TABLET, FILM COATED ORAL DAILY
Qty: 90 TABLET | Refills: 4 | Status: SHIPPED | OUTPATIENT
Start: 2023-11-15

## 2023-11-15 RX ORDER — AMLODIPINE BESYLATE 10 MG/1
10 TABLET ORAL DAILY
Qty: 90 TABLET | Refills: 4 | Status: SHIPPED | OUTPATIENT
Start: 2023-11-15

## 2023-11-15 RX ORDER — METOPROLOL TARTRATE 100 MG/1
100 TABLET ORAL 2 TIMES DAILY
Qty: 180 TABLET | Refills: 4 | Status: SHIPPED | OUTPATIENT
Start: 2023-11-15

## 2023-11-15 RX ORDER — PANTOPRAZOLE SODIUM 40 MG/1
40 TABLET, DELAYED RELEASE ORAL DAILY
Qty: 90 TABLET | Refills: 4 | Status: SHIPPED | OUTPATIENT
Start: 2023-11-15

## 2023-11-15 NOTE — PATIENT INSTRUCTIONS
Gerardo Everett,     If you are due for any health screening(s) below please notify me so we can arrange them to be ordered and scheduled. Most healthy patients at your age complete them, but you are free to accept or refuse.     If you can't do it, I'll definitely understand. If you can, I'd certainly appreciate it!    All of your core healthy metrics are met.                   Follow up in about 1 year (around 11/15/2024), or if symptoms worsen or fail to improve, for Annual Wellness Exam.     Dear patient,   As a result of recent federal legislation (The Federal Cures Act), you may receive lab or pathology results from your visit in your [facility] account before your physician is able to contact you. Your physician or their representative will relay the results to you with their recommendations at their soonest availability.     If no improvement in symptoms or symptoms worsen, please be advised to call MD, follow-up at clinic and/or go to ER if becomes severe.    Randal Keenan M.D.        We Offer TELEHEALTH & Same Day Appointments!   Book your Telehealth appointment with me through my nurse or   Clinic appointments on [employer]!    [address] [place] [address]    Office: [phone]   FAX: [phone]    Check out my Facebook Page and Follow Me at: [email or web address]    Check out my website at [email or web address] by clicking on: [email or web address]    To Schedule appointments online, go to [employer]: [email or web address]

## 2024-05-18 ENCOUNTER — PATIENT MESSAGE (OUTPATIENT)
Dept: FAMILY MEDICINE | Facility: CLINIC | Age: 39
End: 2024-05-18
Payer: COMMERCIAL

## 2024-06-21 DIAGNOSIS — K21.9 GASTROESOPHAGEAL REFLUX DISEASE, UNSPECIFIED WHETHER ESOPHAGITIS PRESENT: ICD-10-CM

## 2024-06-21 DIAGNOSIS — Z79.899 ENCOUNTER FOR LONG-TERM (CURRENT) USE OF MEDICATIONS: ICD-10-CM

## 2024-06-21 RX ORDER — PANTOPRAZOLE SODIUM 40 MG/1
40 TABLET, DELAYED RELEASE ORAL DAILY
Qty: 90 TABLET | Refills: 1 | Status: SHIPPED | OUTPATIENT
Start: 2024-06-21

## 2024-06-21 NOTE — TELEPHONE ENCOUNTER
Provider Staff:  Action required for this patient    Requires labs      Please see care gap opportunities below in Care Due Message.    Thanks!  [facility]     Appointments      Date Provider   Last Visit   11/15/2023 Randal Keenan MD   Next Visit   Visit date not found Randal Keenan MD     Refill Decision Note   Karlos Clark  is requesting a refill authorization.  Brief Assessment and Rationale for Refill:  Approve     Medication Therapy Plan:         Alert overridden per protocol: Yes   Comments:     Note composed:1:33 PM 06/21/2024

## 2024-06-21 NOTE — TELEPHONE ENCOUNTER
Care Due:                  Date            Visit Type   Department     Provider  --------------------------------------------------------------------------------                                MYCHART                              ANNUAL                              CHECKUP/PHY  [facility]  Last Visit: 11-      [facility]       Randal Keenan  Next Visit: None Scheduled  None         None Found                                                            Last  Test          Frequency    Reason                     Performed    Due Date  --------------------------------------------------------------------------------    CMP.........  12 months..  benazepril-hydrochlorthia  11- 11-                             zide.....................    Lipid Panel.  12 months..  atorvastatin.............  11- 11-    [employer] Embedded Care Due Messages. Reference number: [id number].   6/21/2024 11:04:52 AM CDT

## 2024-09-11 ENCOUNTER — PATIENT MESSAGE (OUTPATIENT)
Dept: FAMILY MEDICINE | Facility: CLINIC | Age: 39
End: 2024-09-11
Payer: COMMERCIAL

## 2024-10-14 ENCOUNTER — PATIENT MESSAGE (OUTPATIENT)
Dept: FAMILY MEDICINE | Facility: CLINIC | Age: 39
End: 2024-10-14
Payer: COMMERCIAL

## 2024-10-16 ENCOUNTER — TELEPHONE (OUTPATIENT)
Dept: FAMILY MEDICINE | Facility: CLINIC | Age: 39
End: 2024-10-16
Payer: COMMERCIAL

## 2024-10-21 ENCOUNTER — HOSPITAL ENCOUNTER (OUTPATIENT)
Dept: RADIOLOGY | Facility: HOSPITAL | Age: 39
Discharge: HOME | End: 2024-10-21
Attending: NURSE PRACTITIONER
Payer: COMMERCIAL

## 2024-10-21 ENCOUNTER — OFFICE VISIT (OUTPATIENT)
Dept: FAMILY MEDICINE | Facility: CLINIC | Age: 39
End: 2024-10-21
Payer: COMMERCIAL

## 2024-10-21 VITALS
SYSTOLIC BLOOD PRESSURE: 102 MMHG | OXYGEN SATURATION: 98 % | DIASTOLIC BLOOD PRESSURE: 60 MMHG | RESPIRATION RATE: 17 BRPM | HEIGHT: 70 IN | HEART RATE: 76 BPM | TEMPERATURE: 98 F | WEIGHT: 315 LBS | BODY MASS INDEX: 45.1 KG/M2

## 2024-10-21 DIAGNOSIS — Z01.818 PREOPERATIVE CLEARANCE: ICD-10-CM

## 2024-10-21 DIAGNOSIS — D69.6 THROMBOCYTOPENIA: Chronic | ICD-10-CM

## 2024-10-21 DIAGNOSIS — I10 HYPERTENSION, ESSENTIAL: Chronic | ICD-10-CM

## 2024-10-21 DIAGNOSIS — F33.0 MILD EPISODE OF RECURRENT MAJOR DEPRESSIVE DISORDER: Chronic | ICD-10-CM

## 2024-10-21 DIAGNOSIS — E66.813 CLASS 3 OBESITY WITH ALVEOLAR HYPOVENTILATION, SERIOUS COMORBIDITY, AND BODY MASS INDEX (BMI) OF 40.0 TO 44.9 IN ADULT: Chronic | ICD-10-CM

## 2024-10-21 DIAGNOSIS — I48.0 PAROXYSMAL ATRIAL FIBRILLATION: Chronic | ICD-10-CM

## 2024-10-21 DIAGNOSIS — Z01.818 PREOPERATIVE CLEARANCE: Primary | ICD-10-CM

## 2024-10-21 DIAGNOSIS — E66.2 CLASS 3 OBESITY WITH ALVEOLAR HYPOVENTILATION, SERIOUS COMORBIDITY, AND BODY MASS INDEX (BMI) OF 40.0 TO 44.9 IN ADULT: Chronic | ICD-10-CM

## 2024-10-21 DIAGNOSIS — Z79.899 ENCOUNTER FOR LONG-TERM (CURRENT) USE OF MEDICATIONS: Chronic | ICD-10-CM

## 2024-10-21 PROBLEM — J32.1 CHRONIC FRONTAL SINUSITIS: Status: RESOLVED | Noted: 2018-01-15 | Resolved: 2024-10-21

## 2024-10-21 PROBLEM — R09.81 NASAL CONGESTION: Status: RESOLVED | Noted: 2017-11-08 | Resolved: 2024-10-21

## 2024-10-21 PROBLEM — F32.A ANXIETY AND DEPRESSION: Status: RESOLVED | Noted: 2019-07-23 | Resolved: 2024-10-21

## 2024-10-21 PROBLEM — F41.9 ANXIETY AND DEPRESSION: Status: RESOLVED | Noted: 2019-07-23 | Resolved: 2024-10-21

## 2024-10-21 PROBLEM — J32.3 CHRONIC SPHENOIDAL SINUSITIS: Status: RESOLVED | Noted: 2018-01-15 | Resolved: 2024-10-21

## 2024-10-21 PROCEDURE — 93010 ELECTROCARDIOGRAM REPORT: CPT | Mod: S$GLB,,, | Performed by: INTERNAL MEDICINE

## 2024-10-21 PROCEDURE — 3008F BODY MASS INDEX DOCD: CPT | Mod: CPTII,S$GLB,, | Performed by: NURSE PRACTITIONER

## 2024-10-21 PROCEDURE — 71046 X-RAY EXAM CHEST 2 VIEWS: CPT | Mod: 26,,, | Performed by: RADIOLOGY

## 2024-10-21 PROCEDURE — 3078F DIAST BP <80 MM HG: CPT | Mod: CPTII,S$GLB,, | Performed by: NURSE PRACTITIONER

## 2024-10-21 PROCEDURE — 99999 PR PBB SHADOW E&M-EST. PATIENT-LVL V: CPT | Mod: PBBFAC,,, | Performed by: NURSE PRACTITIONER

## 2024-10-21 PROCEDURE — 99214 OFFICE O/P EST MOD 30 MIN: CPT | Mod: S$GLB,,, | Performed by: NURSE PRACTITIONER

## 2024-10-21 PROCEDURE — 3074F SYST BP LT 130 MM HG: CPT | Mod: CPTII,S$GLB,, | Performed by: NURSE PRACTITIONER

## 2024-10-21 PROCEDURE — G2211 COMPLEX E/M VISIT ADD ON: HCPCS | Mod: S$GLB,,, | Performed by: NURSE PRACTITIONER

## 2024-10-21 PROCEDURE — 1159F MED LIST DOCD IN RCRD: CPT | Mod: CPTII,S$GLB,, | Performed by: NURSE PRACTITIONER

## 2024-10-21 PROCEDURE — 93005 ELECTROCARDIOGRAM TRACING: CPT | Mod: S$GLB,,, | Performed by: NURSE PRACTITIONER

## 2024-10-21 PROCEDURE — 71046 X-RAY EXAM CHEST 2 VIEWS: CPT | Mod: TC,PO

## 2024-10-21 PROCEDURE — 4010F ACE/ARB THERAPY RXD/TAKEN: CPT | Mod: CPTII,S$GLB,, | Performed by: NURSE PRACTITIONER

## 2024-10-21 PROCEDURE — 1160F RVW MEDS BY RX/DR IN RCRD: CPT | Mod: CPTII,S$GLB,, | Performed by: NURSE PRACTITIONER

## 2024-10-21 RX ORDER — MECOBALAMIN 5000 MCG
5000 TABLET,DISINTEGRATING ORAL DAILY
COMMUNITY

## 2024-10-21 NOTE — ASSESSMENT & PLAN NOTE
Continue current medications. Will need cardiac clearance prior to procedure since it has been a few years since cardiology follow up.

## 2024-10-21 NOTE — PROGRESS NOTES
Assessment/Plan:  Problem List Items Addressed This Visit          Psychiatric    Mild episode of recurrent major depressive disorder (Chronic)    Overview     Chronic.  Currently stable per patient.  Following with Psychiatry.  Currently taking Zoloft 100 mg.  Reports compliance.  No side effects reported.  Patient does have some comorbid anxiety.  Denies any SI or HI or hallucinations.         Current Assessment & Plan     Continue follow-up with Psychiatry.  Continue Zoloft.Discussed anxiety condition course.  Discussed SSRI as first-line treatment for this condition.  Discussed risk of discontinuing this medication without tapering.  Patient was educated, advised of side effects, and all questions were answered.  Patient voiced understanding.  Patient will follow up routinely and notify us if having any side effects or worsening or persistent symptoms.  ER precautions were given.         Encounter for long-term (current) use of medications (Chronic)    Overview     October 2024: reviewed labs.  February 2023: Reviewed labs.  December 2022: Reviewed labs.  New elevation of liver enzymes.  Patient reports that he has been drinking loaded Teas recently.  07/23/2019 CHRONIC long-term drug therapy for managed conditions. See medication list. Reports compliance.  No side effects reported.  Routine lab work is being monitored.  Patient does  need refills today.   October 2019:Patient is on CHRONIC long-term drug therapy for managed conditions. See medication list. Reports compliance.  No side effects reported.  Routine lab work is being monitored.  Patient does need refills today. JANUARY 2020:  CHRONIC. Stable. Compliant with medications for managed conditions. See medication list. No SE reported.   Routine lab analysis is being monitored. Refills were addressed.APRIL 2020:  Medications and labs reviewed with the patient.  Patient reports blood pressure has been averaging 120/80 on current medication regimen. CHRONIC.  Stable. Compliant with medications for managed conditions. See medication list. No SE reported.   Routine lab analysis is being monitored. Refills were addressed.  Feb 2021:CHRONIC. Stable. Compliant with medications for managed conditions. See medication list. No SE reported.   Routine lab analysis is being monitored. Refills were addressed.  Lab Results   Component Value Date    WBC 4.8 11/29/2022    HGB 13.7 11/29/2022    HCT 40.7 11/29/2022    MCV 86.4 11/29/2022     (L) 11/29/2022         Chemistry        Component Value Date/Time     10/07/2024 1705     11/29/2022 0801    K 3.9 10/07/2024 1705    K 3.4 (L) 11/29/2022 0801     11/29/2022 0801    CO2 28 10/07/2024 1705    CO2 27 11/29/2022 0801    BUN 12 10/07/2024 1705    BUN 13 11/29/2022 0801    CREATININE 0.89 (L) 10/07/2024 1705    CREATININE 0.72 11/29/2022 0801    GLU 87 11/29/2022 0801        Component Value Date/Time    CALCIUM 9.7 10/07/2024 1705    CALCIUM 8.6 11/29/2022 0801    ALKPHOS 86 10/07/2024 1705    ALKPHOS 67 04/30/2020 0827    AST 21 10/07/2024 1705    AST 17 10/03/2023 0709    ALT 28 10/07/2024 1705    ALT 22 10/03/2023 0709    BILITOT 0.4 10/07/2024 1705    BILITOT 0.7 10/03/2023 0709    ESTGFRAFRICA 121 01/29/2021 0739    EGFRNONAA 105 01/29/2021 0739          Lab Results   Component Value Date    TSH 1.13 11/29/2022    B0BUHBN 7.8 07/24/2019    FREET4 0.79 01/20/2020    T3FREE 3.6 07/24/2019            Current Assessment & Plan     Complete history and physical was completed today.  Complete and thorough medication reconciliation was performed.  Discussed risks and benefits of medications.  Advised patient on orders and health maintenance.  We discussed old records and old labs if available.  Will request any records not available through epic.  Continue current medications listed on your summary sheet.            Cardiac/Vascular    Hypertension, essential (Chronic)    Overview     October 2024: Blood pressure  well controlled     Hypertension Medications               amLODIPine (NORVASC) 10 MG tablet Take 1 tablet (10 mg total) by mouth once daily.    benazepril-hydrochlorthiazide (LOTENSIN HCT) 20-25 mg Tab Take 1 tablet by mouth once daily.    metoprolol tartrate (LOPRESSOR) 100 MG tablet Take 1 tablet (100 mg total) by mouth 2 (two) times daily.     December 2022: Blood pressure is well controlled on current regimen.  Initial HPI:  Chronic.  Uncontrolled.  Patient on benazepril hydrochlorothiazide combination.  Reports compliance.  No side effects reported.  Denies any chest pain shortness of breath blurred vision.   BP Readings from Last 3 Encounters:   12/02/22 112/78   09/09/22 136/84   03/14/22 118/74     Update August 15, 2019.  Blood pressure still uncontrolled.  Patient reports compliance with medications.  Patient reports increased stress after getting of work today.  October 2019:  Chronic.  Borderline uncontrolled.  Patient on digital medicine hypertension program.  Diastolic is starting to increase.  Patient reports compliance with benazepril 20 mg and hydrochlorothiazide 25 mg.  Amlodipine 5 mg.  JANUARY 2020:  Patient recently diagnosed with paroxysmal atrial fibrillation.  Patient is now on metoprolol in addition to his other blood pressure medications.  Patient feels well.  February 2021:  Patient reports having to go up on metoprolol to 100 milligrams twice a day.  Pulse rate and diastolic blood pressure has been well controlled.    CHRONIC. STABLE. BP Reviewed.  Compliant with BP medications. No SE reported.   (-) CP, SOB, palpitations, dizziness, lightheadedness, HA, arm numbness, tingling or weakness, syncope.  Creatinine   Date Value Ref Range Status   11/29/2022 0.72 0.60 - 1.26 mg/dL Final            Paroxysmal atrial fibrillation (Chronic)    Overview     October 2024: hx of afib. On ASA and metoprolol. EKG today NSR.    February 2021:  Patient reports doing well on metoprolol.  Patient had to  go up on the dose to 100 milligrams twice a day.  Patient reports this dose is controlling his blood pressure and pulse better.  Patient feels well.  Previous HPI New problem.  Recently diagnosed in the ER.  Patient following with Cardiology.  Patient was placed on Xarelto and metoprolol.  Patient has great rate controlled today.  Patient denies any symptoms chest pain palpitations or shortness of breath.  Patient states that cardiology wants him to continue Xarelto until further discussion at follow-up office visit.         Current Assessment & Plan     Continue current medications. Will need cardiac clearance prior to procedure since it has been a few years since cardiology follow up.          Relevant Orders    Ambulatory referral/consult to Cardiology    CBC Without Differential (Completed)    Comprehensive Metabolic Panel (Completed)    Hemoglobin A1C (Completed)    IN OFFICE EKG 12-LEAD (to [place]) (Completed)       Hematology    Thrombocytopenia (Chronic)    Overview     Lab Results   Component Value Date    WBC 4.8 11/29/2022    HGB 13.7 11/29/2022    HCT 40.7 11/29/2022    MCV 86.4 11/29/2022     (L) 11/29/2022            Current Assessment & Plan     Recheck CBC. Denies abnormal bleeding.             Endocrine    Class 3 obesity with alveolar hypoventilation, serious comorbidity, and body mass index (BMI) of 40.0 to 44.9 in adult (Chronic)    Overview     Wt Readings from Last 3 Encounters:   10/21/24 0839 (!) 146.1 kg (322 lb 3.2 oz)   11/15/23 1524 (!) 141.4 kg (311 lb 12.8 oz)   02/03/23 1256 129.7 kg (286 lb)            Current Assessment & Plan     Encourage increased physical activity, healthy diet choices, and weight loss for prevention of progression of comorbid conditions.             Other    Preoperative clearance - Primary    Relevant Orders    Ambulatory referral/consult to Cardiology    X-Ray Chest PA And Lateral (Completed)    IN OFFICE EKG 12-LEAD (to [place]) (Completed)   Patient here  for medical preoperative evaluation.  History and physical exam reviewed and performed.  Lab data and imaging that is available was reviewed.  Based off of this patient is CLEARED with class I risk which correlates to a 3.9 % 30-day risk of death, MI, or cardiac arrest based off of modified Maynard cardiac Risk index.  Chronic condition has been reviewed and remains stable  Follow up if symptoms worsen or fail to improve.  ER precautions for severe or worsening symptoms.     Nicol Sprague NP  _____________________________________________________________________________________________________________________________________________________    CC:  preop clearance     HPI: Patient is a 39-year-old male who presents in clinic today as an established patient here for preop clearance for perirectal abscess. The procedure is scheduled for Tuesday, October 29th. The physician that is performing the surgery is Dr. Yesika Zimmerman    He has a history of atrial fibrillation (AFib) with a single episode in 2020, possibly related to COVID-19. He reports no subsequent episodes of AFib. He was previously on Clopidogrel and more recently prescribed aspirin for AFib management, which he is currently taking. He denies any history of blood clots or bleeding problems. He is not currently following with cardiology. Denies chest pain, palpitations, SOB, edema. He also has a history of hypertension, and blood pressures have been well controlled.     MEDICATIONS:  Current medications include metoprolol, Zoloft, Zyrtec, Protonix, and nasal sprays. He denies regular use of ibuprofen he takes occasionally as needed.     He reports a history of tonsillectomy April 2024 which was tolerated well. He indicates experiencing significant improvement following the most recent procedure.    He denies any problems with anesthesia or sedation. He denies  history of blood clots or bleeding disorders. Patient is a nonsmoker. No history of EKATERINA. No  history of MI, PE, DVT, arrhythmia, CHF, diabetes, and/or COPD.         Lab Results   Component Value Date    WBC 8.29 10/21/2024    HGB 14.1 10/21/2024    HCT 42.6 10/21/2024    MCV 88 10/21/2024     10/21/2024     Lab Results   Component Value Date     10/21/2024    K 3.7 10/21/2024     10/21/2024    CO2 23 10/21/2024    BUN 15 10/21/2024    CREATININE 0.8 10/21/2024    CALCIUM 9.7 10/21/2024    ANIONGAP 13 10/21/2024    EGFRNORACEVR >60.0 10/21/2024     Lab Results   Component Value Date    ALT 26 10/21/2024    AST 21 10/21/2024    ALKPHOS 78 10/21/2024    BILITOT 0.8 10/21/2024     Lab Results   Component Value Date    HGBA1C 5.3 10/21/2024     X-Ray Chest PA And Lateral  Narrative: EXAM: XR CHEST PA AND LATERAL    CLINICAL HISTORY:  Encounter for preprocedural exam.    TECHNIQUE: 2 view chest x-ray.    FINDINGS: The heart size is normal. The lung fields are clear.  Impression:  No acute findings.    Finalized on: 10/21/2024 5:38 PM By:  Robb Gautam MD  [facility]# [id number]      2024-10-21 17:40:[id number]    [facility]    Results for orders placed or performed in visit on 10/21/24   IN OFFICE EKG 12-LEAD (to [place])    Collection Time: 10/21/24  8:44 AM   Result Value Ref Range    QRS Duration 106 ms    [facility]HS QTC Calculation 403 ms    Narrative    Test Reason : I48.0,Z01.818,    Vent. Rate : 059 BPM     Atrial Rate : 059 BPM     P-R Int : 146 ms          QRS Dur : 106 ms      QT Int : 408 ms       P-R-T Axes : 028 -02 019 degrees     QTc Int : 403 ms    Sinus bradycardia  Incomplete right bundle branch block  Otherwise normal ECG  When compared with ECG of 20-JAN-2020 11:16,  Incomplete right bundle branch block is now Present  Confirmed by ROBERTO HERNANDEZ MD ([id number]) on 10/22/2024 8:12:02 AM    Referred By: OFELIA BOSS           Confirmed By:ROBERTO HERNANDEZ MD     Past Medical History:  Past Medical History:   Diagnosis Date    Benign essential hypertension 2/20/2013    Depression 12/7/2011    Encounter for  long-term (current) use of medications 7/23/2019 07/23/2019  Patient is on CHRONIC long-term drug therapy for managed conditions. See medication list. Reports compliance.  No side effects reported.  Routine lab work is being monitored.  Patient does  need refills today.   Lab Results Component Value Date  WBC 6.34 09/06/2018  HGB 14.1 09/06/2018  HCT 42.6 09/06/2018  MCV 86 09/06/2018   09/06/2018   CMP Sodium Date Value Ref Range Status     GERD (gastroesophageal reflux disease)     Hypercholesterolemia 2/20/2013    Chronic.  Uncontrolled.  Patient not on medications currently.  Patient has been on lovastatin in the past.  Not adherent to diet.  Lab Results Component Value Date  CHOL 213 (H) 07/24/2019  CHOL 162 09/06/2018  CHOL 181 04/20/2018  Lab Results Component Value Date  HDL 43 07/24/2019  HDL 33 (L) 09/06/2018  HDL 37 (L) 04/20/2018  Lab Results Component Value Date  LDLCALC 139 (H) 07/24/2019  LDLCAL    Hypertension 12/7/2011    Hypotestosteronemia 8/15/2019    Chronic .  Untreated. Lab Results Component Value Date  TESTOSTERONE 221 (L) 07/24/2019  Patient interested in starting replacement.    Obesity 12/7/2011    Vitamin D deficiency 8/15/2019    Chronic.  Untreated.  Patient taking vitamin-D supplementation.  Last vitamin-D level was low.     Past Surgical History:   Procedure Laterality Date    bladder      Pt was having bladder control problems.  Bladder was stretched approx 7 yrs old    SINUS SURGERY       Review of patient's allergies indicates:   Allergen Reactions    Escitalopram oxalate Other (See Comments) and Palpitations     Social History     Tobacco Use    Smoking status: Never    Smokeless tobacco: Never   Substance Use Topics    Alcohol use: Yes     Alcohol/week: 4.0 standard drinks of alcohol     Types: 4 Drinks containing 0.5 oz of alcohol per week     Comment: 2 drinks per month    Drug use: No     Family History   Problem Relation Name Age of Onset    Hypertension Mother  Lavern     Arthritis Mother Lavern     Depression Mother Lavern     Hyperlipidemia Mother Lavern     COPD Father Ravinder     Hypertension Father Ravinder     Alcohol abuse Father Ravinder     Arthritis Father Ravinder     Cancer Father Ravinder         Prostate cancer    Depression Father Ravinder     Hearing loss Father Ravinder     Heart disease Father Ravinder     Hyperlipidemia Father Ravinder     No Known Problems Sister      Arthritis Maternal Grandfather Jv     Cancer Maternal Grandfather Jv         Prostate cancer    Depression Maternal Grandfather Jv     Diabetes Maternal Grandfather Jv     Heart disease Maternal Grandfather Jv     Hyperlipidemia Maternal Grandfather Jv     Hypertension Maternal Grandfather Jv     Kidney disease Maternal Grandfather Jv     Stroke Maternal Grandfather Jv     Arthritis Paternal Grandmother Tawnya     Depression Paternal Grandmother Tawnya     Heart disease Paternal Grandmother Tawnya     Hyperlipidemia Paternal Grandmother Tawnya     Hypertension Paternal Grandmother Tawnya     COPD Paternal Grandfather Isreal         Prostate cancer    Depression Paternal Grandfather Isreal     Allergic rhinitis Neg Hx      Allergies Neg Hx      Angioedema Neg Hx      Asthma Neg Hx      Atopy Neg Hx      Eczema Neg Hx      Immunodeficiency Neg Hx      Rhinitis Neg Hx      Urticaria Neg Hx      Glaucoma Neg Hx       Current Outpatient Medications on File Prior to Visit   Medication Sig Dispense Refill    amLODIPine (NORVASC) 10 MG tablet Take 1 tablet (10 mg total) by mouth once daily. 90 tablet 4    atorvastatin (LIPITOR) 40 MG tablet Take 1 tablet (40 mg total) by mouth once daily. 90 tablet 4    azelastine (ASTELIN) 137 mcg (0.1 %) nasal spray 1 spray (137 mcg total) by Nasal route 2 (two) times daily. 90 mL 4    benazepril-hydrochlorthiazide (LOTENSIN HCT) 20-25 mg Tab Take 1 tablet by mouth once daily. 90 tablet 4    cetirizine (ZYRTEC) 10 MG tablet Take 10 mg by mouth once  "daily.      DOCOSAHEXAENOIC ACID ORAL Take 250 mg by mouth once daily.      famotidine (PEPCID) 20 MG tablet       fluticasone propionate (FLONASE) 50 mcg/actuation nasal spray 1 spray by Each Nostril route once daily.      ibuprofen (ADVIL,MOTRIN) 800 MG tablet Take 800 mg by mouth every 8 (eight) hours as needed.      mecobalamin, vitamin B12, 5,000 mcg TbDL Take 5,000 mcg by mouth once daily.      metoprolol tartrate (LOPRESSOR) 100 MG tablet Take 1 tablet (100 mg total) by mouth 2 (two) times daily. 180 tablet 4    pantoprazole (PROTONIX) 40 MG tablet Take 1 tablet (40 mg total) by mouth once daily. 90 tablet 1    sertraline (ZOLOFT) 100 MG tablet Take 1 tablet (100 mg total) by mouth once daily. 90 tablet 4    aspirin (ECOTRIN) 325 MG EC tablet  (Patient not taking: Reported on 10/22/2024)       No current facility-administered medications on file prior to visit.     Review of Systems   Constitutional:  Negative for appetite change, chills, fatigue and fever.   HENT:  Negative for congestion, rhinorrhea and sore throat.    Eyes:  Negative for visual disturbance.   Respiratory:  Negative for cough and shortness of breath.    Cardiovascular:  Negative for chest pain, palpitations and leg swelling.   Gastrointestinal:  Negative for abdominal pain, diarrhea and vomiting.   Genitourinary:  Negative for difficulty urinating, dysuria and hematuria.        +perirectal abscess   Musculoskeletal:  Negative for arthralgias and myalgias.   Skin:  Negative for rash and wound.   Neurological:  Negative for dizziness and headaches.   Psychiatric/Behavioral:  Negative for behavioral problems. The patient is not nervous/anxious.      Vitals:    10/21/24 0839   BP: 102/60   Pulse: 76   Resp: 17   Temp: 98.1 °F (36.7 °C)   TempSrc: Oral   SpO2: 98%   Weight: (!) 146.1 kg (322 lb 3.2 oz)   Height: 5' 10" (1.778 m)     Wt Readings from Last 3 Encounters:   10/22/24 (!) 146.6 kg (323 lb 4.8 oz)   10/21/24 (!) 146.1 kg (322 lb 3.2 " oz)   11/15/23 (!) 141.4 kg (311 lb 12.8 oz)     Physical Exam  Vitals reviewed.   Constitutional:       General: He is not in acute distress.     Appearance: Normal appearance. He is obese. He is not ill-appearing.   HENT:      Head: Normocephalic and atraumatic.      Right Ear: External ear normal.      Left Ear: External ear normal.      Nose: Nose normal.   Eyes:      Extraocular Movements: Extraocular movements intact.      Conjunctiva/sclera: Conjunctivae normal.   Cardiovascular:      Rate and Rhythm: Normal rate.      Heart sounds: Normal heart sounds.   Pulmonary:      Effort: Pulmonary effort is normal. No respiratory distress.      Breath sounds: Normal breath sounds.   Abdominal:      General: Abdomen is flat. There is no distension.   Genitourinary:     Comments: +deferred  Musculoskeletal:         General: Normal range of motion.      Cervical back: Normal range of motion.   Skin:     General: Skin is warm and dry.      Capillary Refill: Capillary refill takes less than 2 seconds.      Coloration: Skin is not pale.      Findings: No rash.   Neurological:      General: No focal deficit present.      Mental Status: He is alert and oriented to person, place, and time. Mental status is at baseline.   Psychiatric:         Mood and Affect: Mood normal.         Speech: Speech normal. Speech is not rapid and pressured, delayed or slurred.         Behavior: Behavior normal. Behavior is not agitated, slowed, aggressive, withdrawn, hyperactive or combative. Behavior is cooperative.         Thought Content: Thought content normal.         Judgment: Judgment normal.       Health Maintenance   Topic Date Due    Lipid Panel  11/29/2027    TETANUS VACCINE  09/09/2032    Hepatitis C Screening  Completed     DISCLAIMER: This note was compiled by using a speech recognition dictation system and therefore please be aware that typographical / speech recognition errors can and do occur.  Please contact me if you see any  errors specifically.  Consent was obtained for [employer] recording system prior to the visit    Visit today included increased complexity associated with the care of the episodic problem - see above- addressed and managing the longitudinal care of the patient due to the serious and/or complex managed problem(s) - see above.

## 2024-10-22 ENCOUNTER — OFFICE VISIT (OUTPATIENT)
Dept: CARDIOLOGY | Facility: CLINIC | Age: 39
End: 2024-10-22
Payer: COMMERCIAL

## 2024-10-22 VITALS
HEART RATE: 73 BPM | OXYGEN SATURATION: 98 % | WEIGHT: 315 LBS | SYSTOLIC BLOOD PRESSURE: 117 MMHG | BODY MASS INDEX: 46.39 KG/M2 | DIASTOLIC BLOOD PRESSURE: 60 MMHG

## 2024-10-22 DIAGNOSIS — E78.2 MIXED HYPERLIPIDEMIA: ICD-10-CM

## 2024-10-22 DIAGNOSIS — I48.0 PAROXYSMAL ATRIAL FIBRILLATION: Chronic | ICD-10-CM

## 2024-10-22 DIAGNOSIS — I10 HYPERTENSION, ESSENTIAL: Primary | Chronic | ICD-10-CM

## 2024-10-22 DIAGNOSIS — Z01.818 PREOPERATIVE CLEARANCE: ICD-10-CM

## 2024-10-22 LAB
OHS QRS DURATION: 106 MS
OHS QTC CALCULATION: 403 MS

## 2024-10-22 PROCEDURE — 4010F ACE/ARB THERAPY RXD/TAKEN: CPT | Mod: CPTII,S$GLB,, | Performed by: INTERNAL MEDICINE

## 2024-10-22 PROCEDURE — 1160F RVW MEDS BY RX/DR IN RCRD: CPT | Mod: CPTII,S$GLB,, | Performed by: INTERNAL MEDICINE

## 2024-10-22 PROCEDURE — 3008F BODY MASS INDEX DOCD: CPT | Mod: CPTII,S$GLB,, | Performed by: INTERNAL MEDICINE

## 2024-10-22 PROCEDURE — 99999 PR PBB SHADOW E&M-EST. PATIENT-LVL IV: CPT | Mod: PBBFAC,,, | Performed by: INTERNAL MEDICINE

## 2024-10-22 PROCEDURE — 3074F SYST BP LT 130 MM HG: CPT | Mod: CPTII,S$GLB,, | Performed by: INTERNAL MEDICINE

## 2024-10-22 PROCEDURE — 1159F MED LIST DOCD IN RCRD: CPT | Mod: CPTII,S$GLB,, | Performed by: INTERNAL MEDICINE

## 2024-10-22 PROCEDURE — 99205 OFFICE O/P NEW HI 60 MIN: CPT | Mod: S$GLB,,, | Performed by: INTERNAL MEDICINE

## 2024-10-22 PROCEDURE — 3044F HG A1C LEVEL LT 7.0%: CPT | Mod: CPTII,S$GLB,, | Performed by: INTERNAL MEDICINE

## 2024-10-22 PROCEDURE — 3078F DIAST BP <80 MM HG: CPT | Mod: CPTII,S$GLB,, | Performed by: INTERNAL MEDICINE

## 2024-10-22 NOTE — PROGRESS NOTES
Subjective:   Patient ID:  Karlos Clark is a 39 y.o. male who presents for follow-up of Pre-op Exam  Pt presents for preop CV clearance- general surgery perirectal abscess.  Patient denies CP, angina or anginal equivalent.Pt with hx of afib 2020.  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  EKG NSR incomplete RBBB.    HPI  Hyperlipidemia  This is a chronic problem. The current episode started more than 1 year ago. The problem is controlled. Pertinent negatives include no chest pain or shortness of breath. Current antihyperlipidemic treatment includes statins. The current treatment provides moderate improvement of lipids. There are no compliance problems.     Hypertension  This is a chronic problem. The current episode started more than 1 year ago. The problem has been gradually improving since onset. The problem is controlled. Pertinent negatives include no chest pain, palpitations or shortness of breath. Past treatments include beta blockers and angiotensin blockers. The current treatment provides moderate improvement. There are no compliance problems.     Review of Systems   Constitutional: Negative. Negative for weight gain.   HENT: Negative.     Eyes: Negative.    Cardiovascular: Negative.  Negative for chest pain, leg swelling and palpitations.   Respiratory: Negative.  Negative for shortness of breath.    Endocrine: Negative.    Hematologic/Lymphatic: Negative.    Skin: Negative.    Musculoskeletal:  Negative for muscle weakness.   Gastrointestinal: Negative.    Genitourinary: Negative.    Neurological: Negative.  Negative for dizziness.   Psychiatric/Behavioral: Negative.     Allergic/Immunologic: Negative.    All other systems reviewed and are negative.    Family History   Problem Relation Name Age of Onset    Hypertension Mother Lavern     Arthritis Mother Lavern     Depression Mother Lavern     Hyperlipidemia Mother Lavern     COPD Father Ravinder     Hypertension Father  Ravinder     Alcohol abuse Father Ravinder     Arthritis Father Ravinder     Cancer Father Ravinder         Prostate cancer    Depression Father Ravinder     Hearing loss Father Ravinder     Heart disease Father Ravinder     Hyperlipidemia Father Ravinder     No Known Problems Sister      Arthritis Maternal Grandfather Jv     Cancer Maternal Grandfather Jv         Prostate cancer    Depression Maternal Grandfather Jv     Diabetes Maternal Grandfather Jv     Heart disease Maternal Grandfather Jv     Hyperlipidemia Maternal Grandfather Jv     Hypertension Maternal Grandfather Jv     Kidney disease Maternal Grandfather Jv     Stroke Maternal Grandfather Jv     Arthritis Paternal Grandmother Tawnya     Depression Paternal Grandmother Tawnya     Heart disease Paternal Grandmother Tawnya     Hyperlipidemia Paternal Grandmother Tawnya     Hypertension Paternal Grandmother Tawnya     COPD Paternal Grandfather Isreal         Prostate cancer    Depression Paternal Grandfather Isreal     Allergic rhinitis Neg Hx      Allergies Neg Hx      Angioedema Neg Hx      Asthma Neg Hx      Atopy Neg Hx      Eczema Neg Hx      Immunodeficiency Neg Hx      Rhinitis Neg Hx      Urticaria Neg Hx      Glaucoma Neg Hx       Past Medical History:   Diagnosis Date    Benign essential hypertension 2/20/2013    Depression 12/7/2011    Encounter for long-term (current) use of medications 7/23/2019 07/23/2019  Patient is on CHRONIC long-term drug therapy for managed conditions. See medication list. Reports compliance.  No side effects reported.  Routine lab work is being monitored.  Patient does  need refills today.   Lab Results Component Value Date  WBC 6.34 09/06/2018  HGB 14.1 09/06/2018  HCT 42.6 09/06/2018  MCV 86 09/06/2018   09/06/2018   CMP Sodium Date Value Ref Range Status     GERD (gastroesophageal reflux disease)     Hypercholesterolemia 2/20/2013    Chronic.  Uncontrolled.  Patient not on medications  currently.  Patient has been on lovastatin in the past.  Not adherent to diet.  Lab Results Component Value Date  CHOL 213 (H) 07/24/2019  CHOL 162 09/06/2018  CHOL 181 04/20/2018  Lab Results Component Value Date  HDL 43 07/24/2019  HDL 33 (L) 09/06/2018  HDL 37 (L) 04/20/2018  Lab Results Component Value Date  LDLCALC 139 (H) 07/24/2019  LDLCAL    Hypertension 12/7/2011    Hypotestosteronemia 8/15/2019    Chronic .  Untreated. Lab Results Component Value Date  TESTOSTERONE 221 (L) 07/24/2019  Patient interested in starting replacement.    Obesity 12/7/2011    Vitamin D deficiency 8/15/2019    Chronic.  Untreated.  Patient taking vitamin-D supplementation.  Last vitamin-D level was low.     Social History     Socioeconomic History    Marital status: Single   Tobacco Use    Smoking status: Never    Smokeless tobacco: Never   Substance and Sexual Activity    Alcohol use: Yes     Alcohol/week: 4.0 standard drinks of alcohol     Types: 4 Drinks containing 0.5 oz of alcohol per week     Comment: 2 drinks per month    Drug use: No    Sexual activity: Not Currently     Partners: Female     Birth control/protection: Condom     Social Drivers of Health     Financial Resource Strain: Low Risk  (10/14/2024)    Overall Financial Resource Strain (CARDIA)     Difficulty of Paying Living Expenses: Not very hard   Food Insecurity: No Food Insecurity (10/14/2024)    Hunger Vital Sign     Worried About Running Out of Food in the Last Year: Never true     Ran Out of Food in the Last Year: Never true   Transportation Needs: No Transportation Needs (11/8/2023)    PRAPARE - Transportation     Lack of Transportation (Medical): No     Lack of Transportation (Non-Medical): No   Physical Activity: Insufficiently Active (10/14/2024)    Exercise Vital Sign     Days of Exercise per Week: 1 day     Minutes of Exercise per Session: 30 min   Stress: Stress Concern Present (10/14/2024)    [employer] - Occupational  Stress Questionnaire     Feeling of Stress : To some extent   Housing Stability: Low Risk  (11/8/2023)    Housing Stability Vital Sign     Unable to Pay for Housing in the Last Year: No     Number of Places Lived in the Last Year: 1     Unstable Housing in the Last Year: No     Current Outpatient Medications on File Prior to Visit   Medication Sig Dispense Refill    amLODIPine (NORVASC) 10 MG tablet Take 1 tablet (10 mg total) by mouth once daily. 90 tablet 4    atorvastatin (LIPITOR) 40 MG tablet Take 1 tablet (40 mg total) by mouth once daily. 90 tablet 4    azelastine (ASTELIN) 137 mcg (0.1 %) nasal spray 1 spray (137 mcg total) by Nasal route 2 (two) times daily. 90 mL 4    benazepril-hydrochlorthiazide (LOTENSIN HCT) 20-25 mg Tab Take 1 tablet by mouth once daily. 90 tablet 4    cetirizine (ZYRTEC) 10 MG tablet Take 10 mg by mouth once daily.      DOCOSAHEXAENOIC ACID ORAL Take 250 mg by mouth once daily.      famotidine (PEPCID) 20 MG tablet       fluticasone propionate (FLONASE) 50 mcg/actuation nasal spray 1 spray by Each Nostril route once daily.      ibuprofen (ADVIL,MOTRIN) 800 MG tablet Take 800 mg by mouth every 8 (eight) hours as needed.      mecobalamin, vitamin B12, 5,000 mcg TbDL Take 5,000 mcg by mouth once daily.      metoprolol tartrate (LOPRESSOR) 100 MG tablet Take 1 tablet (100 mg total) by mouth 2 (two) times daily. 180 tablet 4    pantoprazole (PROTONIX) 40 MG tablet Take 1 tablet (40 mg total) by mouth once daily. 90 tablet 1    sertraline (ZOLOFT) 100 MG tablet Take 1 tablet (100 mg total) by mouth once daily. 90 tablet 4    aspirin (ECOTRIN) 325 MG EC tablet  (Patient not taking: Reported on 10/22/2024)       No current facility-administered medications on file prior to visit.     Review of patient's allergies indicates:   Allergen Reactions    Escitalopram oxalate Other (See Comments) and Palpitations       Objective:     Physical Exam  Vitals and nursing note reviewed.   Constitutional:        Appearance: He is well-developed.   HENT:      Head: Normocephalic and atraumatic.   Eyes:      Conjunctiva/sclera: Conjunctivae normal.      Pupils: Pupils are equal, round, and reactive to light.   Cardiovascular:      Rate and Rhythm: Normal rate and regular rhythm.      Pulses: Intact distal pulses.      Heart sounds: Normal heart sounds.   Pulmonary:      Effort: Pulmonary effort is normal.      Breath sounds: Normal breath sounds.   Abdominal:      General: Bowel sounds are normal.      Palpations: Abdomen is soft.   Musculoskeletal:      Cervical back: Normal range of motion and neck supple.   Skin:     General: Skin is warm and dry.   Neurological:      Mental Status: He is alert and oriented to person, place, and time.         Assessment:     1. Hypertension, essential    2. Paroxysmal atrial fibrillation    3. Preoperative clearance    4. Mixed hyperlipidemia        Plan:     Hypertension, essential    Paroxysmal atrial fibrillation  -     Ambulatory referral/consult to Cardiology    Preoperative clearance  -     Ambulatory referral/consult to Cardiology    Mixed hyperlipidemia        Continue metoprolol, norvasc, benazapril-hctz-htn  Continue statin-HLP

## 2024-10-30 ENCOUNTER — TELEPHONE (OUTPATIENT)
Dept: SURGERY | Facility: CLINIC | Age: 39
End: 2024-10-30
Payer: COMMERCIAL

## 2024-11-06 ENCOUNTER — OFFICE VISIT (OUTPATIENT)
Dept: FAMILY MEDICINE | Facility: CLINIC | Age: 39
End: 2024-11-06
Payer: COMMERCIAL

## 2024-11-06 VITALS
OXYGEN SATURATION: 99 % | HEIGHT: 70 IN | WEIGHT: 315 LBS | DIASTOLIC BLOOD PRESSURE: 88 MMHG | SYSTOLIC BLOOD PRESSURE: 118 MMHG | HEART RATE: 90 BPM | BODY MASS INDEX: 45.1 KG/M2

## 2024-11-06 DIAGNOSIS — Z00.00 WELL ADULT EXAM: Primary | ICD-10-CM

## 2024-11-06 DIAGNOSIS — Z79.899 ENCOUNTER FOR LONG-TERM (CURRENT) USE OF MEDICATIONS: ICD-10-CM

## 2024-11-06 DIAGNOSIS — K21.9 GASTROESOPHAGEAL REFLUX DISEASE, UNSPECIFIED WHETHER ESOPHAGITIS PRESENT: ICD-10-CM

## 2024-11-06 DIAGNOSIS — E78.2 MIXED HYPERLIPIDEMIA: ICD-10-CM

## 2024-11-06 DIAGNOSIS — F33.0 MILD EPISODE OF RECURRENT MAJOR DEPRESSIVE DISORDER: Chronic | ICD-10-CM

## 2024-11-06 DIAGNOSIS — I10 HYPERTENSION, ESSENTIAL: ICD-10-CM

## 2024-11-06 DIAGNOSIS — Z23 NEED FOR VACCINATION: ICD-10-CM

## 2024-11-06 PROBLEM — K60.30 PERIANAL FISTULA: Status: ACTIVE | Noted: 2024-10-29

## 2024-11-06 PROCEDURE — 3008F BODY MASS INDEX DOCD: CPT | Mod: CPTII,S$GLB,, | Performed by: FAMILY MEDICINE

## 2024-11-06 PROCEDURE — 99395 PREV VISIT EST AGE 18-39: CPT | Mod: 25,S$GLB,, | Performed by: FAMILY MEDICINE

## 2024-11-06 PROCEDURE — 3079F DIAST BP 80-89 MM HG: CPT | Mod: CPTII,S$GLB,, | Performed by: FAMILY MEDICINE

## 2024-11-06 PROCEDURE — 99999 PR PBB SHADOW E&M-EST. PATIENT-LVL V: CPT | Mod: PBBFAC,,, | Performed by: FAMILY MEDICINE

## 2024-11-06 PROCEDURE — 90471 IMMUNIZATION ADMIN: CPT | Mod: S$GLB,,, | Performed by: FAMILY MEDICINE

## 2024-11-06 PROCEDURE — 90472 IMMUNIZATION ADMIN EACH ADD: CPT | Mod: S$GLB,,, | Performed by: FAMILY MEDICINE

## 2024-11-06 PROCEDURE — 90677 PCV20 VACCINE IM: CPT | Mod: S$GLB,,, | Performed by: FAMILY MEDICINE

## 2024-11-06 PROCEDURE — 4010F ACE/ARB THERAPY RXD/TAKEN: CPT | Mod: CPTII,S$GLB,, | Performed by: FAMILY MEDICINE

## 2024-11-06 PROCEDURE — 1159F MED LIST DOCD IN RCRD: CPT | Mod: CPTII,S$GLB,, | Performed by: FAMILY MEDICINE

## 2024-11-06 PROCEDURE — 90656 IIV3 VACC NO PRSV 0.5 ML IM: CPT | Mod: S$GLB,,, | Performed by: FAMILY MEDICINE

## 2024-11-06 PROCEDURE — 3044F HG A1C LEVEL LT 7.0%: CPT | Mod: CPTII,S$GLB,, | Performed by: FAMILY MEDICINE

## 2024-11-06 PROCEDURE — 3074F SYST BP LT 130 MM HG: CPT | Mod: CPTII,S$GLB,, | Performed by: FAMILY MEDICINE

## 2024-11-06 RX ORDER — SERTRALINE HYDROCHLORIDE 100 MG/1
100 TABLET, FILM COATED ORAL DAILY
Qty: 90 TABLET | Refills: 4 | Status: SHIPPED | OUTPATIENT
Start: 2024-11-06

## 2024-11-06 RX ORDER — AMOXICILLIN AND CLAVULANATE POTASSIUM 875; 125 MG/1; MG/1
1 TABLET, FILM COATED ORAL EVERY 12 HOURS
COMMUNITY
Start: 2024-10-29 | End: 2024-11-08

## 2024-11-06 RX ORDER — POLYETHYLENE GLYCOL 3350 17 G/17G
17 POWDER, FOR SOLUTION ORAL
COMMUNITY
Start: 2024-10-29

## 2024-11-06 RX ORDER — ATORVASTATIN CALCIUM 40 MG/1
40 TABLET, FILM COATED ORAL DAILY
Qty: 90 TABLET | Refills: 4 | Status: SHIPPED | OUTPATIENT
Start: 2024-11-06

## 2024-11-06 RX ORDER — METOPROLOL TARTRATE 100 MG/1
100 TABLET ORAL 2 TIMES DAILY
Qty: 180 TABLET | Refills: 4 | Status: SHIPPED | OUTPATIENT
Start: 2024-11-06

## 2024-11-06 RX ORDER — AZELASTINE 1 MG/ML
1 SPRAY, METERED NASAL 2 TIMES DAILY
Qty: 90 ML | Refills: 4 | Status: SHIPPED | OUTPATIENT
Start: 2024-11-06

## 2024-11-06 RX ORDER — PANTOPRAZOLE SODIUM 40 MG/1
40 TABLET, DELAYED RELEASE ORAL DAILY
Qty: 90 TABLET | Refills: 1 | Status: SHIPPED | OUTPATIENT
Start: 2024-11-06

## 2024-11-06 RX ORDER — HYDROCODONE BITARTRATE AND ACETAMINOPHEN 5; 325 MG/1; MG/1
1 TABLET ORAL EVERY 8 HOURS PRN
COMMUNITY
Start: 2024-10-29

## 2024-11-06 RX ORDER — AMLODIPINE BESYLATE 10 MG/1
10 TABLET ORAL DAILY
Qty: 90 TABLET | Refills: 4 | Status: SHIPPED | OUTPATIENT
Start: 2024-11-06

## 2024-11-06 RX ORDER — BENAZEPRIL HYDROCHLORIDE AND HYDROCHLOROTHIAZIDE 20; 25 MG/1; MG/1
1 TABLET ORAL DAILY
Qty: 90 TABLET | Refills: 4 | Status: SHIPPED | OUTPATIENT
Start: 2024-11-06 | End: 2026-01-30

## 2024-11-06 NOTE — PATIENT INSTRUCTIONS
Follow up in about 1 year (around 11/6/2025), or if symptoms worsen or fail to improve.     Dear patient,   As a result of recent federal legislation (The Federal Cures Act), you may receive lab or pathology results from your visit in your [facility] account before your physician is able to contact you. Your physician or their representative will relay the results to you with their recommendations at their soonest availability.     If no improvement in symptoms or symptoms worsen, please be advised to call MD, follow-up at clinic and/or go to ER if becomes severe.    Randal Keenan M.D.        We Offer TELEHEALTH & Same Day Appointments!   Book your Telehealth appointment with me through my nurse or   Clinic appointments on [facility]!    [address] [place] [address]    Office: [phone]   FAX: [phone]    Check out my Facebook Page and Follow Me at: [email or web address]    Check out my website at [email or web address] by clicking on: [email or web address]    To Schedule appointments [email or web address] go to [email or web address]: [email or web address]

## 2024-11-06 NOTE — PROGRESS NOTES
This note is specifically for wellness visit performed today.   WELLNESS EXAM    Patient ID: Karlos Clark is a 39 y.o. male.  has a past medical history of Benign essential hypertension (2/20/2013), Depression (12/7/2011), Encounter for long-term (current) use of medications (7/23/2019), GERD (gastroesophageal reflux disease), Hypercholesterolemia (2/20/2013), Hypertension (12/7/2011), Hypotestosteronemia (8/15/2019), Obesity (12/7/2011), and Vitamin D deficiency (8/15/2019).   Chief Complaint:  Encounter for wellness exam    Well Adult Physical: Patient here for a comprehensive physical exam.The patient reports chronic problems.    History of Present Illness  The patient is a 39-year-old male who presents for an annual wellness visit.    He has been experiencing intermittent bowel issues and is scheduled for a colonoscopy with Dr. Kimbrough. He has previously undergone surgery for seton placement and is due for a follow-up with Dr. Zimmerman tomorrow. His current medications include amlodipine 10 mg, benazepril, hydrochlorothiazide, metoprolol, Lipitor, Zoloft 100 mg, pantoprazole, and Astelin nasal spray. He has discontinued the use of aspirin and B12. He has also had an EKG and a cardiology follow-up, which revealed an incomplete right bundle branch block that has remained unchanged since 2011.       November 2023 patient reports that he is doing well.  Reviewed labs from external.  Recent tonsillectomy.  Doing well sleeping better.  Here for medication refills.  CHRONIC. Stable. Compliant with medications for managed conditions. See medication list. No SE reported.   Routine lab analysis is being monitored. Refills were addressed.  Lab Results   Component Value Date    WBC 8.29 10/21/2024    HGB 14.1 10/21/2024    HCT 42.6 10/21/2024    MCV 88 10/21/2024     10/21/2024         Chemistry        Component Value Date/Time     10/21/2024 0920    K 3.7 10/21/2024 0920     10/21/2024 0920    CO2 23  10/21/2024 0920    BUN 15 10/21/2024 0920    CREATININE 0.8 10/21/2024 0920    GLU 84 10/21/2024 0920        Component Value Date/Time    CALCIUM 9.7 10/21/2024 0920    ALKPHOS 78 10/21/2024 0920    AST 21 10/21/2024 0920    ALT 26 10/21/2024 0920    BILITOT 0.8 10/21/2024 0920    ESTGFRAFRICA 121 01/29/2021 0739    EGFRNONAA 105 01/29/2021 0739          Lab Results   Component Value Date    TSH 1.13 11/29/2022    C1UQFLX 7.8 07/24/2019    FREET4 0.79 01/20/2020    T3FREE 3.6 07/24/2019         Reviewed Care team:Cardiology Dr. Samy A [facility]  On TR T. Check labs every six months.  ENT Darrin Ravi  Urology Barbara Hinkle, [facility]    September 2022:  Patient reports that he is doing well since our last visit.  He is now working for the [employer].     Patient is concerned about a nodule that was supposedly seen on a chest x-ray at Urgent Care.  Patient has not followed since then.  Patient has seen ENT for sinus issues.    Atrial fibrillation: Follows with Cardiology.        Hypertension:  November 2023:  Hypertension Medications               amLODIPine (NORVASC) 10 MG tablet Take 1 tablet (10 mg total) by mouth once daily.    benazepril-hydrochlorthiazide (LOTENSIN HCT) 20-25 mg Tab Take 1 tablet by mouth once daily.    metoprolol tartrate (LOPRESSOR) 100 MG tablet Take 1 tablet (100 mg total) by mouth 2 (two) times daily.            CHRONIC. STABLE. BP Reviewed.  Compliant with BP medications. No SE reported.   (-) CP, SOB, palpitations, dizziness, lightheadedness, HA, arm numbness, tingling or weakness, syncope.  Creatinine   Date Value Ref Range Status   10/21/2024 0.8 0.5 - 1.4 mg/dL Final      hyperlipidemia:  Hyperlipidemia Medications               atorvastatin (LIPITOR) 40 MG tablet Take 1 tablet (40 mg total) by mouth once daily.            CHRONIC. STABLE. Lab analysis reviewed.   (-) CP, SOB, abdominal pain, N/V/D, constipation, jaundice, skin changes.  (-) Myalgias  Lab Results   Component Value  Date    CHOL 112 11/29/2022    CHOL 129 01/29/2021    CHOL 133 04/30/2020     Lab Results   Component Value Date    HDL 27 (L) 11/29/2022    HDL 36 (L) 01/29/2021    HDL 34 (L) 04/30/2020     Lab Results   Component Value Date    LDLCALC 61 11/29/2022    LDLCALC 74 01/29/2021    LDLCALC 77.8 04/30/2020     Lab Results   Component Value Date    TRIG 161 (H) 11/29/2022    TRIG 111 01/29/2021    TRIG 106 04/30/2020     Lab Results   Component Value Date    CHOLHDL 4.1 11/29/2022    CHOLHDL 3.6 01/29/2021    CHOLHDL 25.6 04/30/2020     Lab Results   Component Value Date    TOTALCHOLEST 3.9 04/30/2020    TOTALCHOLEST 4.7 01/10/2020    TOTALCHOLEST 4.9 09/06/2018     Lab Results   Component Value Date    ALT 26 10/21/2024    AST 21 10/21/2024    ALKPHOS 78 10/21/2024    BILITOT 0.8 10/21/2024     ======================================================  GERD:  Chronic.  Stable.  Compliant with pantoprazole 40 milligrams daily.  No side effects reported.  Mood:  Chronic.  Stable.  Compliant with Zoloft 100 milligrams daily.    Do you take any herbs or supplements that were not prescribed by a doctor? no   Are you taking calcium supplements? no      History:  Previously on  Hypogonadism on TRT UROLOGIST:  None but would like to consult with Urology for further testosterone placement therapy.  Patient has been having issues with self injections into the gluteal region with bruising and swelling.    Date last PSA: The natural history of prostate cancer and ongoing controversy regarding screening and potential treatment outcomes of prostate cancer has been discussed with the patient. The meaning of a false positive PSA and a false negative PSA has been discussed. He indicates understanding of the limitations of this screening test and wishes  to proceed with screening PSA testing.    Lab Results   Component Value Date    PSA 0.95 01/10/2020      Lab Results   Component Value Date    TESTOSTERONE 258 08/03/2021       Testosterone (ng/dL)   Date Value   07/24/2019 221 (L)     TESTOSTERONE, TOTAL, MALE (ng/dL)   Date Value   08/03/2021 258     Testosterone, Total (ng/dL)   Date Value   04/30/2020 304      Colon cancer screening:  Not indicated; Pending due to current issues.  Per records patient had a perianal abscess that was surgically opened.  Health Maintenance Topics with due status: Not Due       Topic Last Completion Date    TETANUS VACCINE 09/09/2022    Lipid Panel 11/29/2022    Hemoglobin A1c (Diabetic Prevention Screening) 10/21/2024    RSV Vaccine (Age 60+ and Pregnant patients) Not Due      ==============================================  History reviewed.   There are no preventive care reminders to display for this patient.      Past Medical History:  Past Medical History:   Diagnosis Date    Benign essential hypertension 2/20/2013    Depression 12/7/2011    Encounter for long-term (current) use of medications 7/23/2019 07/23/2019  Patient is on CHRONIC long-term drug therapy for managed conditions. See medication list. Reports compliance.  No side effects reported.  Routine lab work is being monitored.  Patient does  need refills today.   Lab Results Component Value Date  WBC 6.34 09/06/2018  HGB 14.1 09/06/2018  HCT 42.6 09/06/2018  MCV 86 09/06/2018   09/06/2018   CMP Sodium Date Value Ref Range Status     GERD (gastroesophageal reflux disease)     Hypercholesterolemia 2/20/2013    Chronic.  Uncontrolled.  Patient not on medications currently.  Patient has been on lovastatin in the past.  Not adherent to diet.  Lab Results Component Value Date  CHOL 213 (H) 07/24/2019  CHOL 162 09/06/2018  CHOL 181 04/20/2018  Lab Results Component Value Date  HDL 43 07/24/2019  HDL 33 (L) 09/06/2018  HDL 37 (L) 04/20/2018  Lab Results Component Value Date  LDLCALC 139 (H) 07/24/2019  LDLCAL    Hypertension 12/7/2011    Hypotestosteronemia 8/15/2019    Chronic .  Untreated. Lab Results Component Value Date   TESTOSTERONE 221 (L) 07/24/2019  Patient interested in starting replacement.    Obesity 12/7/2011    Vitamin D deficiency 8/15/2019    Chronic.  Untreated.  Patient taking vitamin-D supplementation.  Last vitamin-D level was low.     Past Surgical History:   Procedure Laterality Date    ADENOIDECTOMY  10/25/2023    bladder      Pt was having bladder control problems.  Bladder was stretched approx 5 yrs old    SINUS SURGERY      TONSILLECTOMY  10/25/2023     Review of patient's allergies indicates:   Allergen Reactions    Escitalopram oxalate Other (See Comments) and Palpitations     Current Outpatient Medications on File Prior to Visit   Medication Sig Dispense Refill    amoxicillin-clavulanate 875-125mg (AUGMENTIN) 875-125 mg per tablet Take 1 tablet by mouth every 12 (twelve) hours.      cetirizine (ZYRTEC) 10 MG tablet Take 10 mg by mouth once daily.      DOCOSAHEXAENOIC ACID ORAL Take 250 mg by mouth once daily.      famotidine (PEPCID) 20 MG tablet       fluticasone propionate (FLONASE) 50 mcg/actuation nasal spray 1 spray by Each Nostril route once daily.      HYDROcodone-acetaminophen (NORCO) 5-325 mg per tablet Take 1 tablet by mouth every 8 (eight) hours as needed.      ibuprofen (ADVIL,MOTRIN) 800 MG tablet Take 800 mg by mouth every 8 (eight) hours as needed.      polyethylene glycol (GLYCOLAX) 17 gram/dose powder Take 17 g by mouth.      [DISCONTINUED] amLODIPine (NORVASC) 10 MG tablet Take 1 tablet (10 mg total) by mouth once daily. 90 tablet 4    [DISCONTINUED] atorvastatin (LIPITOR) 40 MG tablet Take 1 tablet (40 mg total) by mouth once daily. 90 tablet 4    [DISCONTINUED] azelastine (ASTELIN) 137 mcg (0.1 %) nasal spray 1 spray (137 mcg total) by Nasal route 2 (two) times daily. 90 mL 4    [DISCONTINUED] benazepril-hydrochlorthiazide (LOTENSIN HCT) 20-25 mg Tab Take 1 tablet by mouth once daily. 90 tablet 4    [DISCONTINUED] metoprolol tartrate (LOPRESSOR) 100 MG tablet Take 1 tablet (100 mg total)  by mouth 2 (two) times daily. 180 tablet 4    [DISCONTINUED] pantoprazole (PROTONIX) 40 MG tablet Take 1 tablet (40 mg total) by mouth once daily. 90 tablet 1    [DISCONTINUED] sertraline (ZOLOFT) 100 MG tablet Take 1 tablet (100 mg total) by mouth once daily. 90 tablet 4    [DISCONTINUED] aspirin (ECOTRIN) 325 MG EC tablet  (Patient not taking: Reported on 11/6/2024)      [DISCONTINUED] mecobalamin, vitamin B12, 5,000 mcg TbDL Take 5,000 mcg by mouth once daily. (Patient not taking: Reported on 11/6/2024)       No current facility-administered medications on file prior to visit.     Social History     Socioeconomic History    Marital status: Single   Tobacco Use    Smoking status: Never    Smokeless tobacco: Never   Substance and Sexual Activity    Alcohol use: Yes     Alcohol/week: 1.0 standard drink of alcohol     Types: 1 Drinks containing 0.5 oz of alcohol per week     Comment: 2 drinks per month    Drug use: No    Sexual activity: Not Currently     Partners: Female     Birth control/protection: Condom     Social Drivers of Health     Financial Resource Strain: Low Risk  (10/14/2024)    Overall Financial Resource Strain (CARDIA)     Difficulty of Paying Living Expenses: Not very hard   Food Insecurity: No Food Insecurity (10/14/2024)    Hunger Vital Sign     Worried About Running Out of Food in the Last Year: Never true     Ran Out of Food in the Last Year: Never true   Transportation Needs: No Transportation Needs (11/8/2023)    PRAPARE - Transportation     Lack of Transportation (Medical): No     Lack of Transportation (Non-Medical): No   Physical Activity: Insufficiently Active (10/14/2024)    Exercise Vital Sign     Days of Exercise per Week: 1 day     Minutes of Exercise per Session: 30 min   Stress: Stress Concern Present (10/14/2024)    [employer] - Occupational Stress Questionnaire     Feeling of Stress : To some extent   Housing Stability: Low Risk  (11/8/2023)    Housing  Stability Vital Sign     Unable to Pay for Housing in the Last Year: No     Number of Places Lived in the Last Year: 1     Unstable Housing in the Last Year: No     Family History   Problem Relation Name Age of Onset    Hypertension Mother Lavern     Arthritis Mother Lavern     Depression Mother Lavern     Hyperlipidemia Mother Lavern     COPD Father Ravinder     Hypertension Father Ravinder     Alcohol abuse Father Ravinder     Arthritis Father Ravinder     Cancer Father Ravinder         Prostate cancer    Depression Father Ravinder     Hearing loss Father Ravinder     Heart disease Father Ravinder     Hyperlipidemia Father Ravinder     No Known Problems Sister      Arthritis Maternal Grandfather Jv     Cancer Maternal Grandfather Jv         Prostate cancer    Depression Maternal Grandfather Jv     Diabetes Maternal Grandfather Jv     Heart disease Maternal Grandfather Jv     Hyperlipidemia Maternal Grandfather Jv     Hypertension Maternal Grandfather Jv     Kidney disease Maternal Grandfather Jv     Stroke Maternal Grandfather Jv     Arthritis Paternal Grandmother Tawnya     Depression Paternal Grandmother Tawnya     Heart disease Paternal Grandmother Tawnya     Hyperlipidemia Paternal Grandmother Tawnya     Hypertension Paternal Grandmother Tawnya     COPD Paternal Grandfather Isreal         Prostate cancer    Depression Paternal Grandfather Isreal     Allergic rhinitis Neg Hx      Allergies Neg Hx      Angioedema Neg Hx      Asthma Neg Hx      Atopy Neg Hx      Eczema Neg Hx      Immunodeficiency Neg Hx      Rhinitis Neg Hx      Urticaria Neg Hx      Glaucoma Neg Hx         Review of Systems   Constitutional:  Negative for activity change and unexpected weight change.   HENT:  Negative for hearing loss, rhinorrhea and trouble swallowing.    Eyes:  Negative for discharge and visual disturbance.   Respiratory:  Negative for chest tightness and wheezing.    Cardiovascular:  Negative for chest pain and  palpitations.   Gastrointestinal:  Negative for blood in stool, constipation, diarrhea and vomiting.   Endocrine: Negative for polydipsia and polyuria.   Genitourinary:  Negative for difficulty urinating, hematuria and urgency.   Musculoskeletal:  Negative for arthralgias, joint swelling and neck pain.   Neurological:  Negative for weakness and headaches.   Psychiatric/Behavioral:  Negative for confusion and dysphoric mood.         Objective:     Vitals:    11/06/24 1250   BP: 118/88   Pulse: 90    Body mass index is 46.27 kg/m².  Physical Exam  Vitals and nursing note reviewed.   Constitutional:       General: He is not in acute distress.     Appearance: He is well-developed. He is obese. He is not diaphoretic.   HENT:      Head: Normocephalic and atraumatic.      Right Ear: External ear normal.      Left Ear: External ear normal.      Nose: Nose normal. No rhinorrhea.   Eyes:      Extraocular Movements: Extraocular movements intact.      Pupils: Pupils are equal, round, and reactive to light.   Neck:      Vascular: No carotid bruit.   Cardiovascular:      Rate and Rhythm: Normal rate.      Pulses: Normal pulses.   Pulmonary:      Effort: Pulmonary effort is normal. No respiratory distress.      Breath sounds: Normal breath sounds.   Abdominal:      General: Bowel sounds are normal.      Palpations: Abdomen is soft.   Musculoskeletal:         General: Normal range of motion.      Cervical back: Normal range of motion and neck supple.   Lymphadenopathy:      Cervical: No cervical adenopathy.   Skin:     General: Skin is warm and dry.      Capillary Refill: Capillary refill takes less than 2 seconds.      Findings: No rash.   Neurological:      General: No focal deficit present.      Mental Status: He is alert and oriented to person, place, and time. Mental status is at baseline.      Cranial Nerves: No cranial nerve deficit.      Motor: No weakness.      Gait: Gait normal.   Psychiatric:         Attention and  Perception: He is attentive.         Mood and Affect: Mood normal. Mood is not anxious or depressed. Affect is not labile, blunt, angry or inappropriate.         Speech: He is communicative. Speech is not rapid and pressured, delayed, slurred or tangential.         Behavior: Behavior normal. Behavior is not agitated, slowed, aggressive, withdrawn, hyperactive or combative.         Thought Content: Thought content normal. Thought content is not paranoid or delusional. Thought content does not include homicidal or suicidal ideation. Thought content does not include homicidal or suicidal plan.         Cognition and Memory: Memory is not impaired.         Judgment: Judgment normal. Judgment is not impulsive or inappropriate.          Lab Visit on 10/21/2024   Component Date Value Ref Range Status    WBC 10/21/2024 8.29  3.90 - 12.70 K/uL Final    RBC 10/21/2024 4.87  4.60 - 6.20 M/uL Final    Hemoglobin 10/21/2024 14.1  14.0 - 18.0 g/dL Final    Hematocrit 10/21/2024 42.6  40.0 - 54.0 % Final    MCV 10/21/2024 88  82 - 98 fL Final    MCH 10/21/2024 29.0  27.0 - 31.0 pg Final    MCHC 10/21/2024 33.1  32.0 - 36.0 g/dL Final    RDW 10/21/2024 13.1  11.5 - 14.5 % Final    Platelets 10/21/2024 186  150 - 450 K/uL Final    MPV 10/21/2024 11.3  9.2 - 12.9 fL Final    Sodium 10/21/2024 144  136 - 145 mmol/L Final    Potassium 10/21/2024 3.7  3.5 - 5.1 mmol/L Final    Chloride 10/21/2024 108  95 - 110 mmol/L Final    CO2 10/21/2024 23  23 - 29 mmol/L Final    Glucose 10/21/2024 84  70 - 110 mg/dL Final    BUN 10/21/2024 15  6 - 20 mg/dL Final    Creatinine 10/21/2024 0.8  0.5 - 1.4 mg/dL Final    Calcium 10/21/2024 9.7  8.7 - 10.5 mg/dL Final    Total Protein 10/21/2024 7.4  6.0 - 8.4 g/dL Final    Albumin 10/21/2024 4.2  3.5 - 5.2 g/dL Final    Total Bilirubin 10/21/2024 0.8  0.1 - 1.0 mg/dL Final    Comment: For infants and newborns, interpretation of results should be based  on gestational age, weight and in agreement with  clinical  observations.    Premature Infant recommended reference ranges:  Up to 24 hours.............<8.0 mg/dL  Up to 48 hours............<12.0 mg/dL  3-5 days..................<15.0 mg/dL  6-29 days.................<15.0 mg/dL      Alkaline Phosphatase 10/21/2024 78  40 - 150 U/L Final    AST 10/21/2024 21  10 - 40 U/L Final    ALT 10/21/2024 26  10 - 44 U/L Final    eGFR 10/21/2024 >60.0  >60 mL/min/1.73 m^2 Final    Anion Gap 10/21/2024 13  8 - 16 mmol/L Final    Hemoglobin A1C 10/21/2024 5.3  4.0 - 5.6 % Final    Comment: ADA Screening Guidelines:  5.7-6.4%  Consistent with prediabetes  >or=6.5%  Consistent with diabetes    High levels of fetal hemoglobin interfere with the HbA1C  assay. Heterozygous hemoglobin variants (HbS, HgC, etc)do  not significantly interfere with this assay.   However, presence of multiple variants may affect accuracy.      Estimated Avg Glucose 10/21/2024 105  68 - 131 mg/dL Final      X-Ray Chest PA And Lateral  Narrative: EXAM: XR CHEST PA AND LATERAL    CLINICAL HISTORY:  Encounter for preprocedural exam.    TECHNIQUE: 2 view chest x-ray.    FINDINGS: The heart size is normal. The lung fields are clear.  Impression:  No acute findings.    Finalized on: 10/21/2024 5:38 PM By:  Robb Gautam MD  [facility]# [id number]      2024-10-21 17:40:[id number]    [facility]    Assessment / Plan:    1.  Routine health exam-patient here for annual wellness exam.  Labs ordered.  Health maintenance was reviewed and ordered.  Anticipatory guidance: Don't smoke.  Healthy diet and regular exercise recommended. Vaccine recommendations discussed.  See orders.  Reviewed Anticipatory guidance, risk factor reduction interventions or counseling as needed, Complete history , physical was completed today.  Complete and thorough medication reconciliation was performed.  Discussed risks and benefits of medications.  Advised patient on orders and health maintenance.  We discussed old records and old labs if available.  Will request  any records not available through epic.  Continue current medications listed on your summary sheet.  All questions were answered. Patient had no further concerns. Advised of diagnoses and plan. Follow up as planned or return sooner if symptoms persist or worsen.   Assessment & Plan  1. Annual wellness visit.  His blood pressure readings are within the normal range at 118/88 mmHg. Recent blood work indicates normal blood counts, kidney and liver function, and A1c levels. A cholesterol level test will be ordered as it has been a while since the last test. He is advised to take Tylenol or ibuprofen as needed and to monitor for any signs of constipation.    2. Hypertension.  He is currently on Amlodipine 10 mg, benazepril, hydrochlorothiazide, and metoprolol. Blood pressure is well-controlled with the current regimen. Continue current medications.    3. Hyperlipidemia.  He is on Lipitor for cholesterol management. A cholesterol level test will be ordered.    4. Depression.  He is on Zoloft 100 mg. Continue current medication.  Please be advised of condition course.  SNRI/SSRI is first-line treatment for this condition.  Please be advised of the risk of discontinuing this medication without tapering/contacting MD.  Patient has been advised of side effects, and all questions were answered.  Patient voiced understanding.  Patient will follow up routinely and notify us if having any side effects or worsening or persistent symptoms.  ER precautions were given. Antidepressant/Antianxiety Medication Initiation:  Patient informed of risks, benefits, and potential side effects of medication and accepts informed consent.  Common side effects include nausea, fatigue, headache, insomnia, etc see medication insert for complete side effect profile.  Most importantly be advised of the possibility of new or worsening suicidal thoughts/depression/anxiety etcetera.  Please be advised to stop the medication immediately and seek urgent  treatment if this occurs.  Therefore please do not to abruptly discontinue medication without physician guidance except in cases of sudden onset or worsening of SI.       5. Gastroesophageal Reflux Disease (GERD).  He is on pantoprazole for reflux. Continue current medication.    6. Allergic Rhinitis.  He is using Astelin nasal spray. Continue current medication.    7. Health Maintenance.  He is due for pneumonia and influenza vaccinations, which will be administered today. He has a colonoscopy scheduled with Dr. Kimbrough to evaluate for potential Crohn's disease or ulcerative colitis.    Follow-up  Return in 1 year for his next wellness visit.    Testosterone replacement therapy:  Follow-up with Urology.  Referral placed to Urology for further evaluation and management.  Discussed risks and benefits of testosterone replacement therapy.  Hypertension:  Continue current regimen.  Counseled on importance of hypertension disease course, I recommend ongoing Education for DASH-diet and exercise.  Counseled on medication regimen importance of treating high blood pressure.  Please be advised of risk of untreated blood pressure as discussed.  Please advised of ER precautions were given for symptoms of hypertensive urgency and emergency.  Hyperlipidemia:  Continue atorvastatin.  Counseled on hyperlipidemia disease course, healthy diet and increased need for exercise.  Please be advised of the risk of cardiovascular disease, increase stroke and heart attack risk with uncontrolled/untreated hyperlipidemia.     Patient voiced understanding and understood the treatment plan. All questions were answered.   GERD RECOMMENDATIONS  Please be advised of condition course.  - Take PPI in the morning 30-60 minutes before breakfast  - I recommend ongoing Education for lifestyle modifications to help control/reduce reflux/abdominal pain including: avoid large meals, avoid eating within 2-3 hours of bedtime (avoid late night eating & lying  down soon after eating), elevate head of bed if nocturnal symptoms are present, smoking cessation (if current smoker), & weight loss (if overweight).   - please be advised to avoid known foods which trigger reflux symptoms & to minimize/avoid high-fat foods, chocolate, caffeine, citrus, alcohol, & tomato products.  - Advised to avoid/limit use of NSAID's, since they can cause GI upset, bleeding, and/or ulcers. If needed, take with food.   Depression: Continue Zoloft 100 milligram.  Please be advised of condition course.  SNRI/SSRI is first-line treatment for this condition.  Please be advised of the risk of discontinuing this medication without tapering/contacting MD.  Patient has been advised of side effects, and all questions were answered.  Patient voiced understanding.  Patient will follow up routinely and notify us if having any side effects or worsening or persistent symptoms.  ER precautions were given. Antidepressant/Antianxiety Medication Initiation:  Patient informed of risks, benefits, and potential side effects of medication and accepts informed consent.  Common side effects include nausea, fatigue, headache, insomnia, etc see medication insert for complete side effect profile.  Most importantly be advised of the possibility of new or worsening suicidal thoughts/depression/anxiety etcetera.  Please be advised to stop the medication immediately and seek urgent treatment if this occurs.  Therefore please do not to abruptly discontinue medication without physician guidance except in cases of sudden onset or worsening of SI.   BMI improving with weight loss.    Pulmonary nodule:  Chest x-ray negative.  Patient would like to start with chest x-ray which I believe is reasonable.  Requesting records from urgent care I do not have previous imaging.  If any symptoms or concerns will get CT of the chest.  Patient consents to influenza vaccination.  Discussed risk benefits.  Orders Placed This Encounter    Procedures    Lipid Panel     Standing Status:   Future     Standing Expiration Date:   2026     Order Specific Question:   Send normal result to authorizing provider's In Basket if patient is active on MyChart:     Answer:   Yes       Medications Ordered This Encounter   Medications    (VFC) influenza (Flulaval, Fluzone, Fluarix) 45 mcg/0.5 mL IM vaccine (> or = 6 mo) 0.5 mL    amLODIPine (NORVASC) 10 MG tablet     Sig: Take 1 tablet (10 mg total) by mouth once daily.     Dispense:  90 tablet     Refill:  4     .    atorvastatin (LIPITOR) 40 MG tablet     Sig: Take 1 tablet (40 mg total) by mouth once daily.     Dispense:  90 tablet     Refill:  4    azelastine (ASTELIN) 137 mcg (0.1 %) nasal spray     Si spray (137 mcg total) by Nasal route 2 (two) times daily.     Dispense:  90 mL     Refill:  4    benazepril-hydrochlorthiazide (LOTENSIN HCT) 20-25 mg Tab     Sig: Take 1 tablet by mouth once daily.     Dispense:  90 tablet     Refill:  4    metoprolol tartrate (LOPRESSOR) 100 MG tablet     Sig: Take 1 tablet (100 mg total) by mouth 2 (two) times daily.     Dispense:  180 tablet     Refill:  4     .    pantoprazole (PROTONIX) 40 MG tablet     Sig: Take 1 tablet (40 mg total) by mouth once daily.     Dispense:  90 tablet     Refill:  1     Please inactivate all prior scripts with same name and strength including on holds.    pneumoc 20-abebe conj-dip cr(PF) (PREVNAR-20 (PF)) injection Syrg 0.5 mL    sertraline (ZOLOFT) 100 MG tablet     Sig: Take 1 tablet (100 mg total) by mouth once daily.     Dispense:  90 tablet     Refill:  4      Future Appointments       Date Provider Specialty Appt Notes    2024 Lance Ratliff MD Surgery Perianal fistula/Has SETON 10/29/24-Dr. Yesika Zimmerman/    2025 Ramon Vasquez MD Cardiology 6 mon           Randal Keenan MD

## 2024-11-07 ENCOUNTER — LAB VISIT (OUTPATIENT)
Dept: LAB | Facility: HOSPITAL | Age: 39
End: 2024-11-07
Attending: FAMILY MEDICINE
Payer: COMMERCIAL

## 2024-11-07 DIAGNOSIS — E78.2 MIXED HYPERLIPIDEMIA: ICD-10-CM

## 2024-11-07 LAB
CHOLEST SERPL-MCNC: 164 MG/DL (ref 120–199)
CHOLEST/HDLC SERPL: 4.4 {RATIO} (ref 2–5)
HDLC SERPL-MCNC: 37 MG/DL (ref 40–75)
HDLC SERPL: 22.6 % (ref 20–50)
LDLC SERPL CALC-MCNC: 87 MG/DL (ref 63–159)
NONHDLC SERPL-MCNC: 127 MG/DL
TRIGL SERPL-MCNC: 200 MG/DL (ref 30–150)

## 2024-11-07 PROCEDURE — 80061 LIPID PANEL: CPT | Performed by: FAMILY MEDICINE

## 2024-11-07 PROCEDURE — 36415 COLL VENOUS BLD VENIPUNCTURE: CPT | Mod: PO | Performed by: FAMILY MEDICINE

## 2024-11-08 NOTE — PROGRESS NOTES
Make follow-up lab appointment per recommendation below.  Check to see if patient has seen the results through my chart.  If not then,  #CALL THE PATIENT# to discuss results/see if they have questions and document verification of contact. Make F/U appt if needed. [phone]    #My interpretation that was sent to them through [facility]:  Karlos, I have reviewed your recent blood work.     Your cholesterol is mostly stable and within normal limits except for elevated triglycerides.  Please add/increase fish oil supplement over-the-counter.  If no improvement may need to start medication to lower triglycerides.  HDL level is improved from previous.  Continue to focus on exercise to improve this level.  =========================  Also please address any outstanding health maintenance that may be due: There are no preventive care reminders to display for this patient.

## 2024-12-05 ENCOUNTER — OFFICE VISIT (OUTPATIENT)
Dept: SURGERY | Facility: CLINIC | Age: 39
End: 2024-12-05
Payer: COMMERCIAL

## 2024-12-05 VITALS
SYSTOLIC BLOOD PRESSURE: 145 MMHG | TEMPERATURE: 98 F | DIASTOLIC BLOOD PRESSURE: 91 MMHG | HEART RATE: 80 BPM | BODY MASS INDEX: 45.1 KG/M2 | WEIGHT: 315 LBS | RESPIRATION RATE: 16 BRPM | HEIGHT: 70 IN

## 2024-12-05 DIAGNOSIS — K61.2 ANORECTAL ABSCESS: ICD-10-CM

## 2024-12-05 DIAGNOSIS — K60.30 FISTULA-IN-ANO: Primary | ICD-10-CM

## 2024-12-05 PROCEDURE — 99999 PR PBB SHADOW E&M-EST. PATIENT-LVL III: CPT | Mod: PBBFAC,,, | Performed by: SURGERY

## 2024-12-05 NOTE — PROGRESS NOTES
Subjective     Patient ID: Karlos Clark is a 39 y.o. male.    Chief Complaint: No chief complaint on file.    HPI   this is a pleasant 39-year-old gentleman who was referred to me for evaluation of fistula in ANO and possible perianal abscess.  Patient notes that he presented a proximally a month ago significant pain and discomfort in the perianal area.  He was having significant drainage.  He underwent EUA at another facility in which a posterior midline fistula in ANO was identified.  A seton probe was placed.  There was also a corresponding abscess.  There was concern about Crohn's disease.  He has since been seen by GI and had endoscopy.  He is meeting with the endoscopist on Monday to discuss findings.  He is referred to me for evaluation and management of the fistula.  He does note continued drainage.  He does have another area of the left lateral position that he notes was not drained.  He does have some persistent drainage from this.  Patient suffers with hypertension.  No significant abdominal rectal surgical history.  Review of Systems   Constitutional:  Negative for activity change and appetite change.   HENT:  Negative for dental problem.    Gastrointestinal:  Negative for abdominal distention and change in bowel habit.   Hematological:  Negative for adenopathy.   Psychiatric/Behavioral:  Negative for agitation.           Objective     Physical Exam  Vitals reviewed.   Genitourinary:     Comments: External exam does demonstrate a seton in place in the posterior midline.  There is also a external slight opening in the left lateral position.  Digital rectal exam with normal sphincter tone.  No palpable abnormalities were noted.  Musculoskeletal:      Cervical back: Normal range of motion.   Neurological:      Mental Status: He is alert.            Assessment and Plan     Fistula in ANO possible Crohn's disease    Lengthy discussion with the patient on exam he does have findings concerning for suspected  fistula in ANO.  I do suspect there might be another fistula than left lateral position.  The 1 in the posterior midline appears to be intersphincteric or superficial.  Given the questionable Crohn's disease do believe it would be best to proceed with MRI to further assess.  We will also await discussion with his gastroenterologist to further ensure no evidence of Crohn's disease.  Once the MRIs done and he is discuss with GI we will make further recommendations.  We will obtain MRI         No follow-ups on file.

## 2024-12-09 ENCOUNTER — PATIENT MESSAGE (OUTPATIENT)
Dept: SURGERY | Facility: CLINIC | Age: 39
End: 2024-12-09
Payer: COMMERCIAL

## 2024-12-18 ENCOUNTER — PATIENT OUTREACH (OUTPATIENT)
Dept: ADMINISTRATIVE | Facility: HOSPITAL | Age: 39
End: 2024-12-18
Payer: COMMERCIAL

## 2024-12-18 NOTE — LETTER
[id number]    AUTHORIZATION FOR RELEASE OF   CONFIDENTIAL INFORMATION    Dear Dr. Darrin Kimbrough,    We are seeing Karlso Clark, date of birth 1985, in the clinic at [facility]. Randal Keenan MD is the patient's PCP. Karlos Clark has an outstanding lab/procedure at the time we reviewed his chart. In order to help keep his health information updated, he has authorized us to request the following medical record(s):           ( x ) 2024 COLON PATHOLOGY REPORT & RECALL RECOMMENDATION     ** Repeat colonoscopy in ______ years             Please fax/email records to:  Fax #: [phone]  Email: [email or web address]     If you have any questions, please contact    LEVY Kirk at [phone]          Patient Name: Karlos Clark  : 1985  Patient Phone #: [phone]

## 2024-12-19 NOTE — PROGRESS NOTES
Manually uploaded COLONOSCOPY 11/21/2024 to media.  [facility] faxed to Dr. Darrin Kimbrough 1x to request colon pathology report and recall recommendation. Reminder set.

## 2025-01-02 ENCOUNTER — HOSPITAL ENCOUNTER (OUTPATIENT)
Dept: RADIOLOGY | Facility: HOSPITAL | Age: 40
Discharge: HOME | End: 2025-01-02
Attending: SURGERY
Payer: COMMERCIAL

## 2025-01-02 DIAGNOSIS — K60.30 FISTULA-IN-ANO: ICD-10-CM

## 2025-01-02 PROCEDURE — 72197 MRI PELVIS W/O & W/DYE: CPT | Mod: TC,PO

## 2025-01-02 PROCEDURE — 25500020 PHARM REV CODE 255: Mod: PO | Performed by: SURGERY

## 2025-01-02 PROCEDURE — 72197 MRI PELVIS W/O & W/DYE: CPT | Mod: 26,,, | Performed by: STUDENT IN AN ORGANIZED HEALTH CARE EDUCATION/TRAINING PROGRAM

## 2025-01-02 PROCEDURE — A9585 GADOBUTROL INJECTION: HCPCS | Mod: PO | Performed by: SURGERY

## 2025-01-02 RX ORDER — GADOBUTROL 604.72 MG/ML
10 INJECTION INTRAVENOUS
Status: COMPLETED | OUTPATIENT
Start: 2025-01-02 | End: 2025-01-02

## 2025-01-02 RX ADMIN — GADOBUTROL 10 ML: 604.72 INJECTION INTRAVENOUS at 12:01

## 2025-01-06 ENCOUNTER — TELEPHONE (OUTPATIENT)
Dept: SURGERY | Facility: HOSPITAL | Age: 40
End: 2025-01-06

## 2025-01-06 NOTE — TELEPHONE ENCOUNTER
Called pt and reviewed MRI findings  He has intersphincteric fistula in ano  Will need EUA with fistulotomy  Will have office arrange office visit

## 2025-01-10 ENCOUNTER — TELEPHONE (OUTPATIENT)
Dept: SURGERY | Facility: CLINIC | Age: 40
End: 2025-01-10
Payer: COMMERCIAL

## 2025-01-10 NOTE — TELEPHONE ENCOUNTER
LMOR @ 11:14am for patient to call back to schedule an appointment to see Dr. Ratliff to schedule an EUA with Fistulotomy.  Emmanuel  
Patient called back and I scheduled him to see Dr. Ratliff on Thursday, 1/16/25 @ 9am in [place].  Emmanuel  
Patent

## 2025-01-16 ENCOUNTER — OFFICE VISIT (OUTPATIENT)
Dept: SURGERY | Facility: CLINIC | Age: 40
End: 2025-01-16
Payer: COMMERCIAL

## 2025-01-16 VITALS — HEART RATE: 70 BPM | TEMPERATURE: 97 F | SYSTOLIC BLOOD PRESSURE: 127 MMHG | DIASTOLIC BLOOD PRESSURE: 82 MMHG

## 2025-01-16 DIAGNOSIS — Z01.818 PREOP EXAMINATION: ICD-10-CM

## 2025-01-16 DIAGNOSIS — K60.30 FISTULA-IN-ANO: Primary | ICD-10-CM

## 2025-01-16 PROCEDURE — 3074F SYST BP LT 130 MM HG: CPT | Mod: CPTII,S$GLB,, | Performed by: SURGERY

## 2025-01-16 PROCEDURE — 99999 PR PBB SHADOW E&M-EST. PATIENT-LVL III: CPT | Mod: PBBFAC,,, | Performed by: SURGERY

## 2025-01-16 PROCEDURE — 99213 OFFICE O/P EST LOW 20 MIN: CPT | Mod: S$GLB,,, | Performed by: SURGERY

## 2025-01-16 PROCEDURE — 3079F DIAST BP 80-89 MM HG: CPT | Mod: CPTII,S$GLB,, | Performed by: SURGERY

## 2025-01-16 RX ORDER — CELECOXIB 200 MG/1
200 CAPSULE ORAL DAILY
COMMUNITY

## 2025-01-16 NOTE — PROGRESS NOTES
Subjective     Patient ID: Karlos Clark is a 39 y.o. male.    Chief Complaint: Follow-up (F/U to schedule EUA with fistulotomy)    HPI  this is a pleasant 39-year-old gentleman whom I have seen in the past.  He was seen by me in December of 2024.  He had presented with a seton in place in the posterior midline.  There was another separate opening in the left lateral position.  There worrisome concerns that time for Crohn's disease.  Endoscopic workup was negative for any inflammatory bowel disease.  He has also had an MRI since that time demonstrating what appears to be 2 separate intersphincteric fistulous.  He presents today for definitive management.  No significant changes otherwise.  Review of Systems   Constitutional:  Negative for activity change and appetite change.   Respiratory:  Negative for apnea.    Musculoskeletal:  Negative for arthralgias and back pain.          Objective     Physical Exam  Vitals reviewed.   Cardiovascular:      Rate and Rhythm: Normal rate.      Pulses: Normal pulses.   Abdominal:      General: There is no distension.      Tenderness: There is no abdominal tenderness.      Hernia: No hernia is present.   Genitourinary:     Comments: Rectal exam deferred today however notes from previously did demonstrate 2 separate external openings were in the posterior midline the other in the left lateral position consistent with fistulous  Neurological:      Mental Status: He is alert.   Psychiatric:         Mood and Affect: Mood normal.       MRI reviewed.  Two separate intersphincteric fistula was noted     Assessment and Plan     Fistula in ANo        Lengthy discussion with the patient.  Does have findings consistent with the intersphincteric fistula in ANO.  As such would proceed with likely fistulotomy.  Depending on intraoperative findings may require seton placement.  Given patient's body habitus this will need to be done in the hospital setting.  Surgery has been arranged for January  27th         No follow-ups on file.

## 2025-01-16 NOTE — PATIENT INSTRUCTIONS
Surgery is scheduled for 1/27/25 arrival time will be given by the the preop nurse.  You may receive two calls from the Preop Nurses one a few days before surgery the second the day before surgery with the arrival time.  The preop nurse will call you from [phone]  Nothing to eat or drink after midnight.  Someone to drive you home if you are same day surgery.    THE PREOP NURSE WILL CALL, SOMETIMES AS LATE AS 4 or 5 PM IN THE AFTERNOON THE DAY BEFORE SURGERY.    Shower the night before and the morning of your procedure with Chlorhexidine Surgical Scrub also known as Hibiclens it can be purchased at most Pharmacy's no prescription needed.    Special Instruction:     Your procedure/surgery is scheduled at the [facility] location formerly known as [facility] at [address] [place].     Contact Shun Lehman LPN for questions are concerns. [phone]

## 2025-01-17 RX ORDER — CEFAZOLIN SODIUM 2 G/50ML
2 SOLUTION INTRAVENOUS
Status: CANCELLED | OUTPATIENT
Start: 2025-01-17

## 2025-01-17 RX ORDER — SODIUM CHLORIDE 9 MG/ML
INJECTION, SOLUTION INTRAVENOUS CONTINUOUS
Status: CANCELLED | OUTPATIENT
Start: 2025-01-17

## 2025-01-24 ENCOUNTER — ANESTHESIA EVENT (OUTPATIENT)
Dept: SURGERY | Facility: HOSPITAL | Age: 40
End: 2025-01-24
Payer: COMMERCIAL

## 2025-01-27 ENCOUNTER — HOSPITAL ENCOUNTER (OUTPATIENT)
Facility: HOSPITAL | Age: 40
Discharge: HOME | End: 2025-01-27
Attending: SURGERY | Admitting: SURGERY
Payer: COMMERCIAL

## 2025-01-27 ENCOUNTER — ANESTHESIA (OUTPATIENT)
Dept: SURGERY | Facility: HOSPITAL | Age: 40
End: 2025-01-27
Payer: COMMERCIAL

## 2025-01-27 DIAGNOSIS — Z01.818 PREOPERATIVE CLEARANCE: ICD-10-CM

## 2025-01-27 DIAGNOSIS — K60.30 PERIANAL FISTULA: Primary | ICD-10-CM

## 2025-01-27 DIAGNOSIS — K60.30 FISTULA-IN-ANO: ICD-10-CM

## 2025-01-27 DIAGNOSIS — Z01.818 PREOP EXAMINATION: ICD-10-CM

## 2025-01-27 LAB
ANION GAP SERPL CALC-SCNC: 10 MMOL/L (ref 8–16)
BASOPHILS # BLD AUTO: 0.05 K/UL (ref 0–0.2)
BASOPHILS NFR BLD: 0.8 % (ref 0–1.9)
BUN SERPL-MCNC: 14 MG/DL (ref 6–20)
CALCIUM SERPL-MCNC: 9.2 MG/DL (ref 8.7–10.5)
CHLORIDE SERPL-SCNC: 106 MMOL/L (ref 95–110)
CO2 SERPL-SCNC: 25 MMOL/L (ref 23–29)
CREAT SERPL-MCNC: 0.9 MG/DL (ref 0.5–1.4)
DIFFERENTIAL METHOD BLD: ABNORMAL
EOSINOPHIL # BLD AUTO: 0.2 K/UL (ref 0–0.5)
EOSINOPHIL NFR BLD: 3.6 % (ref 0–8)
ERYTHROCYTE [DISTWIDTH] IN BLOOD BY AUTOMATED COUNT: 12.4 % (ref 11.5–14.5)
EST. GFR  (NO RACE VARIABLE): >60 ML/MIN/1.73 M^2
GLUCOSE SERPL-MCNC: 87 MG/DL (ref 70–110)
HCT VFR BLD AUTO: 41 % (ref 40–54)
HGB BLD-MCNC: 13.7 G/DL (ref 14–18)
IMM GRANULOCYTES # BLD AUTO: 0.01 K/UL (ref 0–0.04)
IMM GRANULOCYTES NFR BLD AUTO: 0.2 % (ref 0–0.5)
LYMPHOCYTES # BLD AUTO: 2.1 K/UL (ref 1–4.8)
LYMPHOCYTES NFR BLD: 34.1 % (ref 18–48)
MCH RBC QN AUTO: 28.5 PG (ref 27–31)
MCHC RBC AUTO-ENTMCNC: 33.4 G/DL (ref 32–36)
MCV RBC AUTO: 85 FL (ref 82–98)
MONOCYTES # BLD AUTO: 0.4 K/UL (ref 0.3–1)
MONOCYTES NFR BLD: 6.6 % (ref 4–15)
NEUTROPHILS # BLD AUTO: 3.4 K/UL (ref 1.8–7.7)
NEUTROPHILS NFR BLD: 54.7 % (ref 38–73)
NRBC BLD-RTO: 0 /100 WBC
PLATELET # BLD AUTO: 188 K/UL (ref 150–450)
PMV BLD AUTO: 10.5 FL (ref 9.2–12.9)
POTASSIUM SERPL-SCNC: 3.5 MMOL/L (ref 3.5–5.1)
RBC # BLD AUTO: 4.81 M/UL (ref 4.6–6.2)
SODIUM SERPL-SCNC: 141 MMOL/L (ref 136–145)
WBC # BLD AUTO: 6.19 K/UL (ref 3.9–12.7)

## 2025-01-27 PROCEDURE — 80048 BASIC METABOLIC PNL TOTAL CA: CPT | Performed by: ANESTHESIOLOGY

## 2025-01-27 PROCEDURE — 71000015 HC POSTOP RECOV 1ST HR: Performed by: SURGERY

## 2025-01-27 PROCEDURE — 46285 REMOVE ANAL FIST 2 STAGE: CPT | Mod: 59,,, | Performed by: SURGERY

## 2025-01-27 PROCEDURE — 94799 UNLISTED PULMONARY SVC/PX: CPT

## 2025-01-27 PROCEDURE — 71000039 HC RECOVERY, EACH ADD'L HOUR: Performed by: SURGERY

## 2025-01-27 PROCEDURE — 63600175 PHARM REV CODE 636 W HCPCS: Performed by: STUDENT IN AN ORGANIZED HEALTH CARE EDUCATION/TRAINING PROGRAM

## 2025-01-27 PROCEDURE — 36000707: Performed by: SURGERY

## 2025-01-27 PROCEDURE — 37000009 HC ANESTHESIA EA ADD 15 MINS: Performed by: SURGERY

## 2025-01-27 PROCEDURE — 46275 REMOVE ANAL FIST INTER: CPT | Mod: ,,, | Performed by: SURGERY

## 2025-01-27 PROCEDURE — 85025 COMPLETE CBC W/AUTO DIFF WBC: CPT | Performed by: ANESTHESIOLOGY

## 2025-01-27 PROCEDURE — 63600175 PHARM REV CODE 636 W HCPCS: Performed by: SURGERY

## 2025-01-27 PROCEDURE — 71000033 HC RECOVERY, INTIAL HOUR: Performed by: SURGERY

## 2025-01-27 PROCEDURE — 25000003 PHARM REV CODE 250: Performed by: SURGERY

## 2025-01-27 PROCEDURE — 36000706: Performed by: SURGERY

## 2025-01-27 PROCEDURE — 25000003 PHARM REV CODE 250: Performed by: STUDENT IN AN ORGANIZED HEALTH CARE EDUCATION/TRAINING PROGRAM

## 2025-01-27 PROCEDURE — 36415 COLL VENOUS BLD VENIPUNCTURE: CPT | Performed by: ANESTHESIOLOGY

## 2025-01-27 PROCEDURE — 37000008 HC ANESTHESIA 1ST 15 MINUTES: Performed by: SURGERY

## 2025-01-27 PROCEDURE — 25000003 PHARM REV CODE 250: Performed by: ANESTHESIOLOGY

## 2025-01-27 PROCEDURE — 63600175 PHARM REV CODE 636 W HCPCS: Mod: JZ,TB | Performed by: STUDENT IN AN ORGANIZED HEALTH CARE EDUCATION/TRAINING PROGRAM

## 2025-01-27 RX ORDER — KETOROLAC TROMETHAMINE 30 MG/ML
INJECTION, SOLUTION INTRAMUSCULAR; INTRAVENOUS
Status: DISCONTINUED | OUTPATIENT
Start: 2025-01-27 | End: 2025-01-27

## 2025-01-27 RX ORDER — BUPIVACAINE HYDROCHLORIDE AND EPINEPHRINE 2.5; 5 MG/ML; UG/ML
INJECTION, SOLUTION EPIDURAL; INFILTRATION; INTRACAUDAL; PERINEURAL
Status: DISCONTINUED | OUTPATIENT
Start: 2025-01-27 | End: 2025-01-27 | Stop reason: HOSPADM

## 2025-01-27 RX ORDER — HYDROCODONE BITARTRATE AND ACETAMINOPHEN 5; 325 MG/1; MG/1
1 TABLET ORAL EVERY 6 HOURS PRN
Qty: 20 TABLET | Refills: 0 | Status: SHIPPED | OUTPATIENT
Start: 2025-01-27

## 2025-01-27 RX ORDER — PROPOFOL 10 MG/ML
VIAL (ML) INTRAVENOUS
Status: DISCONTINUED | OUTPATIENT
Start: 2025-01-27 | End: 2025-01-27

## 2025-01-27 RX ORDER — DEXAMETHASONE SODIUM PHOSPHATE 4 MG/ML
INJECTION, SOLUTION INTRA-ARTICULAR; INTRALESIONAL; INTRAMUSCULAR; INTRAVENOUS; SOFT TISSUE
Status: DISCONTINUED | OUTPATIENT
Start: 2025-01-27 | End: 2025-01-27

## 2025-01-27 RX ORDER — SODIUM CHLORIDE 9 MG/ML
INJECTION, SOLUTION INTRAVENOUS CONTINUOUS
Status: CANCELLED | OUTPATIENT
Start: 2025-01-27

## 2025-01-27 RX ORDER — FENTANYL CITRATE 50 UG/ML
INJECTION, SOLUTION INTRAMUSCULAR; INTRAVENOUS
Status: DISCONTINUED | OUTPATIENT
Start: 2025-01-27 | End: 2025-01-27

## 2025-01-27 RX ORDER — CEFAZOLIN 2 G/1
2 INJECTION, POWDER, FOR SOLUTION INTRAMUSCULAR; INTRAVENOUS
Status: DISCONTINUED | OUTPATIENT
Start: 2025-01-27 | End: 2025-01-27 | Stop reason: HOSPADM

## 2025-01-27 RX ORDER — ONDANSETRON HYDROCHLORIDE 2 MG/ML
INJECTION, SOLUTION INTRAVENOUS
Status: DISCONTINUED | OUTPATIENT
Start: 2025-01-27 | End: 2025-01-27

## 2025-01-27 RX ORDER — LIDOCAINE HYDROCHLORIDE 10 MG/ML
1 INJECTION, SOLUTION EPIDURAL; INFILTRATION; INTRACAUDAL; PERINEURAL ONCE
Status: DISCONTINUED | OUTPATIENT
Start: 2025-01-27 | End: 2025-01-27 | Stop reason: HOSPADM

## 2025-01-27 RX ORDER — ACETAMINOPHEN 10 MG/ML
INJECTION, SOLUTION INTRAVENOUS
Status: DISCONTINUED | OUTPATIENT
Start: 2025-01-27 | End: 2025-01-27

## 2025-01-27 RX ORDER — LIDOCAINE HYDROCHLORIDE 20 MG/ML
INJECTION INTRAVENOUS
Status: DISCONTINUED | OUTPATIENT
Start: 2025-01-27 | End: 2025-01-27

## 2025-01-27 RX ORDER — HYDROCODONE BITARTRATE AND ACETAMINOPHEN 5; 325 MG/1; MG/1
1 TABLET ORAL EVERY 4 HOURS PRN
Status: CANCELLED | OUTPATIENT
Start: 2025-01-27

## 2025-01-27 RX ORDER — SUCCINYLCHOLINE CHLORIDE 20 MG/ML
INJECTION INTRAMUSCULAR; INTRAVENOUS
Status: DISCONTINUED | OUTPATIENT
Start: 2025-01-27 | End: 2025-01-27

## 2025-01-27 RX ORDER — MIDAZOLAM HYDROCHLORIDE 1 MG/ML
INJECTION INTRAMUSCULAR; INTRAVENOUS
Status: DISCONTINUED | OUTPATIENT
Start: 2025-01-27 | End: 2025-01-27

## 2025-01-27 RX ORDER — SODIUM CHLORIDE 9 MG/ML
INJECTION, SOLUTION INTRAVENOUS CONTINUOUS
Status: DISCONTINUED | OUTPATIENT
Start: 2025-01-27 | End: 2025-01-27 | Stop reason: HOSPADM

## 2025-01-27 RX ORDER — ROCURONIUM BROMIDE 10 MG/ML
INJECTION, SOLUTION INTRAVENOUS
Status: DISCONTINUED | OUTPATIENT
Start: 2025-01-27 | End: 2025-01-27

## 2025-01-27 RX ORDER — OXYCODONE HYDROCHLORIDE 5 MG/1
5 TABLET ORAL ONCE AS NEEDED
Status: COMPLETED | OUTPATIENT
Start: 2025-01-27 | End: 2025-01-27

## 2025-01-27 RX ORDER — ONDANSETRON HYDROCHLORIDE 2 MG/ML
4 INJECTION, SOLUTION INTRAVENOUS EVERY 12 HOURS PRN
Status: CANCELLED | OUTPATIENT
Start: 2025-01-27

## 2025-01-27 RX ORDER — FENTANYL CITRATE 50 UG/ML
25 INJECTION, SOLUTION INTRAMUSCULAR; INTRAVENOUS EVERY 5 MIN PRN
Status: DISCONTINUED | OUTPATIENT
Start: 2025-01-27 | End: 2025-01-27 | Stop reason: HOSPADM

## 2025-01-27 RX ORDER — ONDANSETRON HYDROCHLORIDE 2 MG/ML
4 INJECTION, SOLUTION INTRAVENOUS ONCE AS NEEDED
Status: DISCONTINUED | OUTPATIENT
Start: 2025-01-27 | End: 2025-01-27 | Stop reason: HOSPADM

## 2025-01-27 RX ADMIN — DEXAMETHASONE SODIUM PHOSPHATE 4 MG: 4 INJECTION, SOLUTION INTRA-ARTICULAR; INTRALESIONAL; INTRAMUSCULAR; INTRAVENOUS; SOFT TISSUE at 01:01

## 2025-01-27 RX ADMIN — CEFAZOLIN 2 G: 2 INJECTION, POWDER, FOR SOLUTION INTRAMUSCULAR; INTRAVENOUS at 01:01

## 2025-01-27 RX ADMIN — SUCCINYLCHOLINE CHLORIDE 160 MG: 20 INJECTION, SOLUTION INTRAMUSCULAR; INTRAVENOUS at 01:01

## 2025-01-27 RX ADMIN — KETOROLAC TROMETHAMINE 30 MG: 30 INJECTION, SOLUTION INTRAMUSCULAR; INTRAVENOUS at 01:01

## 2025-01-27 RX ADMIN — ONDANSETRON 8 MG: 2 INJECTION INTRAMUSCULAR; INTRAVENOUS at 01:01

## 2025-01-27 RX ADMIN — FENTANYL CITRATE 75 MCG: 50 INJECTION, SOLUTION INTRAMUSCULAR; INTRAVENOUS at 01:01

## 2025-01-27 RX ADMIN — LIDOCAINE HYDROCHLORIDE 100 MG: 20 INJECTION, SOLUTION INTRAVENOUS at 01:01

## 2025-01-27 RX ADMIN — OXYCODONE 5 MG: 5 TABLET ORAL at 02:01

## 2025-01-27 RX ADMIN — ROCURONIUM BROMIDE 5 MG: 10 INJECTION, SOLUTION INTRAVENOUS at 01:01

## 2025-01-27 RX ADMIN — FENTANYL CITRATE 25 MCG: 50 INJECTION, SOLUTION INTRAMUSCULAR; INTRAVENOUS at 01:01

## 2025-01-27 RX ADMIN — ACETAMINOPHEN 1000 MG: 10 INJECTION INTRAVENOUS at 01:01

## 2025-01-27 RX ADMIN — MIDAZOLAM HYDROCHLORIDE 2 MG: 1 INJECTION INTRAMUSCULAR; INTRAVENOUS at 12:01

## 2025-01-27 RX ADMIN — PROPOFOL 200 MG: 10 INJECTION, EMULSION INTRAVENOUS at 01:01

## 2025-01-27 RX ADMIN — SODIUM CHLORIDE, SODIUM GLUCONATE, SODIUM ACETATE, POTASSIUM CHLORIDE AND MAGNESIUM CHLORIDE: 526; 502; 368; 37; 30 INJECTION, SOLUTION INTRAVENOUS at 08:01

## 2025-01-27 NOTE — CARE UPDATE
01/27/25 0837   Patient Assessment/Suction   Level of Consciousness (AVPU) alert   Incentive Spirometer   $ Incentive Spirometer Charges done with encouragement   Incentive Spirometer Predicted Level (mL) 2360   Administration (IS) instruction provided, initial;proper technique demonstrated   Number of Repetitions (IS) 2   Level Incentive Spirometer (mL) 4000   Patient Tolerance (IS) good

## 2025-01-27 NOTE — DISCHARGE INSTRUCTIONS
Post op instructions for prevention of DVT  What is deep vein thrombosis?  Deep vein thrombosis (DVT) is the medical term for blood clots in the deep veins of the leg.  These blood clots can be dangerous.  A DVT can block a blood vessel and keep blood from getting where it needs to go.  Another problem is that the clot can travel to other parts of the body such as the lungs.  A clot that travels to the lungs is called a pulmonary embolus (PE) and can cause serious problems with breathing which can lead to death.  Am I at risk for DVT/PE?  If you are not very active, you are at risk of DVT.  Anyone confined to bed, sitting for long periods of time, recovering from surgery, etc. increases the risk of DVT.  Other risk factors are cancer diagnosis, certain medications, estrogen replacement in any form,older age, obesity, pregnancy, smoking, history of clotting disorders, and dehydration.  How will I know if I have a DVT?  Swelling in the lower leg  Pain  Warmth, redness, hardness or bulging of the vein  If you have any of these symptoms, call your doctors office right away.  Some people will not have any symptoms until the clot moves to the lungs.  What are the symptoms of a PE?  Panting, shortness of breath, or trouble breathing  Sharp, knife-like chest pain when you breathe  Coughing or coughing up blood  Rapid heartbeat  If you have any of these symptoms or get worse quickly, call 911 for emergency treatment.  How can I prevent a DVT?  Avoid long periods of inactivity and dont cross your legs--get up and walk around every hour or so.  Stay active--walking after surgery is highly encouraged.  This means you should get out of the house and walk in the neighborhood.  Going up and down stairs will not impair healing (unless advised against such activity by your doctor).    Drink plenty of noncaffeinated, nonalcoholic fluids each day to prevent dehydration.  Wear special support stockings, if they have been advised by  "your doctor.  If you travel, stop at least once an hour and walk around.  Avoid smoking (assistance with stopping is available through your healthcare provider)  Always notify your doctor if you are not able to follow the post operative instructions that are given to you at the time of discharge.  It may be necessary to prescribe one of the medications available to prevent DVT.Discharge Instructions: After Your Surgery/Procedure  Youve just had surgery. During surgery you were given medicine called anesthesia to keep you relaxed and free of pain. After surgery you may have some pain or nausea. This is common. Here are some tips for feeling better and getting well after surgery.     Stay on schedule with your medication.   Going home  Your doctor or nurse will show you how to take care of yourself when you go home. He or she will also answer your questions. Have an adult family member or friend drive you home.      For your safety we recommend these precaution for the first 24 hours after your procedure:  Do not drive or use heavy equipment.  Do not make important decisions or sign legal papers.  Do not drink alcohol.  Have someone stay with you, if needed. He or she can watch for problems and help keep you safe.  Your concentration, balance, coordination, and judgement may be impaired for many hours after anesthesia.  Use caution when ambulating or standing up.     You may feel weak and "washed out" after anesthesia and surgery.      Subtle residual effects of general anesthesia or sedation with regional / local anesthesia can last more than 24 hours.  Rest for the remainder of the day or longer if your Doctor/Surgeon has advised you to do so.  Although you may feel normal within the first 24 hours, your reflexes and mental ability may be impaired without you realizing it.  You may feel dizzy, lightheaded or sleepy for 24 hours or longer.      Be sure to go to all follow-up visits with your doctor. And rest after " your surgery for as long as your doctor tells you to.  Coping with pain  If you have pain after surgery, pain medicine will help you feel better. Take it as told, before pain becomes severe. Also, ask your doctor or pharmacist about other ways to control pain. This might be with heat, ice, or relaxation. And follow any other instructions your surgeon or nurse gives you.  Tips for taking pain medicine  To get the best relief possible, remember these points:  Pain medicines can upset your stomach. Taking them with a little food may help.  Most pain relievers taken by mouth need at least 20 to 30 minutes to start to work.  Taking medicine on a schedule can help you remember to take it. Try to time your medicine so that you can take it before starting an activity. This might be before you get dressed, go for a walk, or sit down for dinner.  Constipation is a common side effect of pain medicines. Call your doctor before taking any medicines such as laxatives or stool softeners to help ease constipation. Also ask if you should skip any foods. Drinking lots of fluids and eating foods such as fruits and vegetables that are high in fiber can also help. Remember, do not take laxatives unless your surgeon has prescribed them.  Drinking alcohol and taking pain medicine can cause dizziness and slow your breathing. It can even be deadly. Do not drink alcohol while taking pain medicine.  Pain medicine can make you react more slowly to things. Do not drive or run machinery while taking pain medicine.  Your health care provider may tell you to take acetaminophen to help ease your pain. Ask him or her how much you are supposed to take each day. Acetaminophen or other pain relievers may interact with your prescription medicines or other over-the-counter (OTC) drugs. Some prescription medicines have acetaminophen and other ingredients. Using both prescription and OTC acetaminophen for pain can cause you to overdose. Read the labels on  your OTC medicines with care. This will help you to clearly know the list of ingredients, how much to take, and any warnings. It may also help you not take too much acetaminophen. If you have questions or do not understand the information, ask your pharmacist or health care provider to explain it to you before you take the OTC medicine.  Managing nausea  Some people have an upset stomach after surgery. This is often because of anesthesia, pain, or pain medicine, or the stress of surgery. These tips will help you handle nausea and eat healthy foods as you get better. If you were on a special food plan before surgery, ask your doctor if you should follow it while you get better. These tips may help:  Do not push yourself to eat. Your body will tell you when to eat and how much.  Start off with clear liquids and soup. They are easier to digest.  Next try semi-solid foods, such as mashed potatoes, applesauce, and gelatin, as you feel ready.  Slowly move to solid foods. Dont eat fatty, rich, or spicy foods at first.  Do not force yourself to have 3 large meals a day. Instead eat smaller amounts more often.  Take pain medicines with a small amount of solid food, such as crackers or toast, to avoid nausea.     Call your surgeon if  You still have pain an hour after taking medicine. The medicine may not be strong enough.  You feel too sleepy, dizzy, or groggy. The medicine may be too strong.  You have side effects like nausea, vomiting, or skin changes, such as rash, itching, or hives.       If you have obstructive sleep apnea  You were given anesthesia medicine during surgery to keep you comfortable and free of pain. After surgery, you may have more apnea spells because of this medicine and other medicines you were given. The spells may last longer than usual.   At home:  Keep using the continuous positive airway pressure (CPAP) device when you sleep. Unless your health care provider tells you not to, use it when you  sleep, day or night. CPAP is a common device used to treat obstructive sleep apnea.  Talk with your provider before taking any pain medicine, muscle relaxants, or sedatives. Your provider will tell you about the possible dangers of taking these medicines.  © 1583-8280 [employer]. [address] [place] [address]. All rights reserved. This information is not intended as a substitute for professional medical care. Always follow your healthcare professional's instructions.  General Information:    1.  Do not drink alcoholic beverages including beer for 24 hours or as long as you are on pain medication..  2.  Do not drive a motor vehicle, operate machinery or power tools, or signs legal papers for 24 hours or as long as you are on pain medication.   3.  You may experience light-headedness, dizziness, and sleepiness following surgery. Please do not stay alone. A responsible adult should be with you for this 24 hour period.  4.  Go home and rest.    5. Progress slowly to a normal diet unless instructed.  Otherwise, begin with liquids such as soft drinks, then soup and crackers working up to solid foods. Drink plenty of nonalcoholic fluids.  6.  Certain anesthetics and pain medications produce nausea and vomiting in certain       individuals. If nausea becomes a problem at home, call you doctor.    7. A nurse will be calling you sometime after surgery. Do not be alarmed. This is our way of finding out how you are doing.    8. Several times every hour while you are awake, take 2-3 deep breaths and cough. If you had stomach surgery hold a pillow or rolled towel firmly against your stomach before you cough. This will help with any pain the cough might cause.  9. Several times every hour while you are awake, pump and flex your feet 5-6 times and do foot circles. This will help prevent blood clots.    10.Call your doctor for severe pain, bleeding, fever, or signs or symptoms of infection (pain, swelling,  redness, foul odor, drainage).  Using Opioids for Pain Management     Your doctor has given instructions for you to take an opioid.  This is a drug for bad pain.  It helps control pain without causing bleeding and kidney problems.  Common opioid names are morphine, hydromorphone, oxycodone, and methadone. These drugs are called narcotics.    There are several safety concerns you need to know.     It is against the law to give or sell this drug to another person.  You must keep this medicine safely locked.    You may have side effects from taking this medication.  These include nausea, itching, sweating, sleepiness, a change in your ability to breathe, and depression.  Do not take alcohol or sleeping pills opioids.    Long-term opoid use may no longer giver you relief from pain.  It can cause you stomach pain, mental anxiety, and headaches.  Long-term opoid use can potentially lead to unlawful street drug abuse and reduce your ability to stay employed.    Your body may become opioid tolerant if you need to take more to get relief.    You must stop taking opioids if you begin having more pain as a result of the medicine.    Opioid withdrawal occurs when you have to stop taking the drug.  It can cause you to have nausea, vomiting, diarrhea, stomach pain, anxiety, and dilated pupils in your eyes. This condition means you are opioid dependent.    Addiction is a drug induced brain disease. It means there are changes in how your brain is working.  Children, teens, and young adults under 25 years old are more likely to get addicted to opioids.      Addiction can happen with repeated opioid use.  It does not happen with short-term use of two weeks or less.       For more information, please speak with your doctor or pharmacist.     Using an Incentive Spirometer    An incentive spirometer is a device that helps you do deep breathing exercises. These exercises expand your lungs, aid in circulation, and help prevent pneumonia.  Deep breathing exercises also help you breathe better and improve the function of your lungs by:  Keeping your lungs clear  Strengthening your breathing muscles  Helping prevent respiratory complications or problems  The incentive spirometer gives you a way to take an active part in recover. A nurse or therapist will teach you breathing exercises. To do these exercises, you will breathe in through your mouth and not your nose. The incentive spirometer only works correctly if you breathe in through your mouth.  Steps to clear lungs  Step 1. Exhale normally. Then, inhale normally.  Relax and breathe out.  Step 2. Place your lips tightly around the mouthpiece.  Make sure the device is upright and not tilted.  Step 3. Inhale as much air as you can through the mouthpiece (don't breath through your nose).  Inhale slowly and deeply.  Hold your breath long enough to keep the balls or disk raised for at least 3 to 5 seconds, or as instructed by your healthcare provider.  Some spirometers have an indicator to let you know that you are breathing in too fast. If the indicator goes off, breathe in more slowly.  Step 4. Repeat the exercise regularly.  Do this exercise every hour while you're awake, or as instructed by your healthcare provider.  If you were taught deep breathing and coughing exercises, do them regularly as instructed by your healthcare provider.       We hope your stay was comfortable as you heal now, mend and rest.    For we have enjoyed taking care of you by giving your our best.    And as you get better, by regaining your health and strength;   We count it as a privilege to have served you and hope your time at [facility] was well spent.      Thank  You!!!

## 2025-01-27 NOTE — TRANSFER OF CARE
"Anesthesia Transfer of Care Note    Patient: Karlos Clark    Procedure(s) Performed: Procedure(s) (LRB):  Exam under anesthesia (N/A)  FISTULOTOMY, ANAL (N/A)    Patient location: PACU    Anesthesia Type: general    Transport from OR: Transported from OR on 6-10 L/min O2 by face mask with adequate spontaneous ventilation    Post pain: adequate analgesia    Post assessment: no apparent anesthetic complications and tolerated procedure well    Post vital signs: stable    Level of consciousness: awake    Nausea/Vomiting: no nausea/vomiting    Complications: none    Transfer of care protocol was followed      Last vitals: Visit Vitals  /89 (BP Location: Left forearm, Patient Position: Lying)   Pulse 75   Temp 36.6 °C (97.9 °F) (Skin)   Resp 20   Ht 5' 10" (1.778 m)   Wt (!) 147.4 kg (325 lb)   SpO2 98%   BMI 46.63 kg/m²     "

## 2025-01-27 NOTE — BRIEF OP NOTE
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    DISPOSITION: Home or Self Care    FINAL DIAGNOSIS:  Fistula-in-ano    FOLLOWUP: In clinic    DISCHARGE INSTRUCTIONS:    Discharge Procedure Orders   Diet general     Call MD for:  extreme fatigue     Call MD for:  persistent dizziness or light-headedness     Call MD for:  hives     Call MD for:  redness, tenderness, or signs of infection (pain, swelling, redness, odor or green/yellow discharge around incision site)     Call MD for:  difficulty breathing, headache or visual disturbances     Call MD for:  severe uncontrolled pain     Call MD for:  persistent nausea and vomiting     Call MD for:  temperature >100.4     Remove dressing in 48 hours     Activity as tolerated

## 2025-01-27 NOTE — ANESTHESIA PREPROCEDURE EVALUATION
01/27/2025  Karlos Clark is a 39 y.o., male.      Pre-op Assessment    I have reviewed the Patient Summary Reports.     I have reviewed the Nursing Notes. I have reviewed the NPO Status.   I have reviewed the Medications.     Review of Systems  Cardiovascular:     Hypertension           hyperlipidemia   ECG has been reviewed. · Normal left ventricular systolic function. The estimated ejection fraction is 60%.  · Normal LV diastolic function.  · Concentric left ventricular remodeling.  · Normal right ventricular systolic function.  · Normal central venous pressure (3 mmHg).  · The estimated PA systolic pressure is 36 mmHg.                                Pulmonary:  Pulmonary Normal                       Renal/:  Chronic Renal Disease                Hepatic/GI:     GERD                Endocrine:        Morbid Obesity / BMI > 40  Psych:  Psychiatric History anxiety                 Physical Exam    Airway:  Mallampati: II   Neck ROM: Normal ROM    Dental:  Intact    Chest/Lungs:  Clear to auscultation, Normal Respiratory Rate    Heart:  Rate: Normal  Rhythm: Regular Rhythm        Anesthesia Plan  Type of Anesthesia, risks & benefits discussed:    Anesthesia Type: Gen ETT  Intra-op Monitoring Plan: Standard ASA Monitors  Post Op Pain Control Plan: multimodal analgesia and IV/PO Opioids PRN  Induction:  IV and Inhalation  Informed Consent: Informed consent signed with the Patient and all parties understand the risks and agree with anesthesia plan.  All questions answered.   ASA Score: 3    Ready For Surgery From Anesthesia Perspective.     .

## 2025-01-27 NOTE — PLAN OF CARE
Released per anesthesia, when criteria met. VS WDL, Resp even and unlabored. IS reviewed and pt encouraged to continue independently. Dressing to bottom dry and tucked between the buttocks, pain manageable. Has not voided, but has tolerated 2 cups of water.

## 2025-01-27 NOTE — ANESTHESIA PROCEDURE NOTES
Intubation    Date/Time: 1/27/2025 1:08 PM    Performed by: Dennis Soares CRNA  Authorized by: Kate Black MD    Intubation:     Induction:  Intravenous    Intubated:  Postinduction    Mask Ventilation:  Easy with oral airway    Attempts:  1    Attempted By:  CRNA (Dennis Soares)    Method of Intubation:  Video laryngoscopy    Blade:  Harris 3    Laryngeal View Grade: Grade I - full view of cords      Difficult Airway Encountered?: No      Complications:  None    Airway Device:  Oral endotracheal tube    Airway Device Size:  7.5    Style/Cuff Inflation:  Cuffed    Inflation Amount (mL):  6    Tube secured:  23    Secured at:  The lips    Placement Verified By:  Capnometry    Complicating Factors:  None    Findings Post-Intubation:  BS equal bilateral and atraumatic/condition of teeth unchanged

## 2025-01-27 NOTE — OP NOTE
Date of procedure: January 27, 2025     Staff surgeon:  Dr. 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  He is scheduled for EUA on the above-mentioned date.      Description of procedure:  Following signing informed consent patient has taken the operating room placed in supine position.  General endotracheal anesthesia was administered.  He was flipped and placed in prone ayleen-knife position with the buttocks taped apart.  Examination under anesthesia was commenced.  In the posterior midline there is a intersphincteric fistula in ANO which was identified.  There is a draining seton in place.  There was also a external opening in the left lateral position.  I began by placing a fistula probe through this left lateral opening.  This does tract directly towards the anal canal however initially no internal opening was appreciated.  I do inject hydrogen peroxide through this and do find the internal opening indirect communication with this.  Lacrimal probe was then inserted in the internal opening is identified.  Intersphincteric fistulotomy was then performed in the base of the tract was cauterized and curetted.  I then repeat the same steps for the tract in the posterior midline which was also intersphincteric in nature.  Having performed both fistulotomy I do inject the area with Marcaine.  Patient was then awakened taken recovery room stable condition.  There were no immediate complications.  Blood loss was 10 cc

## 2025-01-27 NOTE — ANESTHESIA POSTPROCEDURE EVALUATION
Anesthesia Post Evaluation    Patient: Karlos Clark    Procedure(s) Performed: Procedure(s) (LRB):  Exam under anesthesia (N/A)  FISTULOTOMY, ANAL (N/A)    Final Anesthesia Type: general      Patient location during evaluation: PACU  Patient participation: Yes- Able to Participate  Level of consciousness: awake and alert  Post-procedure vital signs: reviewed and stable  Pain management: adequate  Airway patency: patent    PONV status at discharge: No PONV  Anesthetic complications: no      Cardiovascular status: blood pressure returned to baseline  Respiratory status: unassisted and room air  Hydration status: euvolemic  Follow-up not needed.              Vitals Value Taken Time   /92 01/27/25 1354   Temp  01/27/25 1411   Pulse 76 01/27/25 1411   Resp 20 01/27/25 1411   SpO2 97 % 01/27/25 1411   Vitals shown include unfiled device data.      No case tracking events are documented in the log.      Pain/Jaleel Score: Pain Rating Prior to Med Admin: 3 (1/27/2025  2:09 PM)  Jaleel Score: 9 (1/27/2025  2:00 PM)

## 2025-01-28 VITALS
HEIGHT: 70 IN | DIASTOLIC BLOOD PRESSURE: 92 MMHG | RESPIRATION RATE: 18 BRPM | SYSTOLIC BLOOD PRESSURE: 141 MMHG | OXYGEN SATURATION: 99 % | BODY MASS INDEX: 45.1 KG/M2 | TEMPERATURE: 98 F | WEIGHT: 315 LBS | HEART RATE: 60 BPM

## 2025-02-13 ENCOUNTER — OFFICE VISIT (OUTPATIENT)
Dept: SURGERY | Facility: CLINIC | Age: 40
End: 2025-02-13
Payer: COMMERCIAL

## 2025-02-13 VITALS
TEMPERATURE: 97 F | HEIGHT: 70 IN | WEIGHT: 315 LBS | BODY MASS INDEX: 45.1 KG/M2 | DIASTOLIC BLOOD PRESSURE: 78 MMHG | HEART RATE: 74 BPM | SYSTOLIC BLOOD PRESSURE: 123 MMHG

## 2025-02-13 DIAGNOSIS — Z09 POSTOP CHECK: Primary | ICD-10-CM

## 2025-02-13 PROCEDURE — 1159F MED LIST DOCD IN RCRD: CPT | Mod: CPTII,S$GLB,, | Performed by: SURGERY

## 2025-02-13 PROCEDURE — 99024 POSTOP FOLLOW-UP VISIT: CPT | Mod: S$GLB,,, | Performed by: SURGERY

## 2025-02-13 PROCEDURE — 3078F DIAST BP <80 MM HG: CPT | Mod: CPTII,S$GLB,, | Performed by: SURGERY

## 2025-02-13 PROCEDURE — 3074F SYST BP LT 130 MM HG: CPT | Mod: CPTII,S$GLB,, | Performed by: SURGERY

## 2025-02-13 PROCEDURE — 99999 PR PBB SHADOW E&M-EST. PATIENT-LVL IV: CPT | Mod: PBBFAC,,, | Performed by: SURGERY

## 2025-02-13 NOTE — PROGRESS NOTES
Cc: post op    HPI: 39 y.o.  male  2 weeks s/p EUA with fisultomty.   Pt doing well.        PE: AFVSS    AAOx3  CTA  Soft/NT/nd  Inc: granulating      A/P:   Pt doing well post surgery.   F/U with me prn

## 2025-08-31 DIAGNOSIS — Z79.899 ENCOUNTER FOR LONG-TERM (CURRENT) USE OF MEDICATIONS: ICD-10-CM

## 2025-08-31 DIAGNOSIS — K21.9 GASTROESOPHAGEAL REFLUX DISEASE, UNSPECIFIED WHETHER ESOPHAGITIS PRESENT: ICD-10-CM

## 2025-09-01 RX ORDER — PANTOPRAZOLE SODIUM 40 MG/1
40 TABLET, DELAYED RELEASE ORAL DAILY
Qty: 90 TABLET | Refills: 0 | Status: SHIPPED | OUTPATIENT
Start: 2025-09-01

## (undated) DEVICE — COVER PROXIMA MAYO STAND

## (undated) DEVICE — GLOVE SENSICARE PI ALOE 7.5

## (undated) DEVICE — PACK CUSTOM UNIV BASIN SLI

## (undated) DEVICE — SPONGE BULKEE II ABSRB 6X6.75

## (undated) DEVICE — TOWEL OR DISP STRL BLUE 4/PK

## (undated) DEVICE — TAPE SILK 3IN

## (undated) DEVICE — STRAP OR TABLE 5IN X 72IN

## (undated) DEVICE — SYR 10CC LUER LOCK

## (undated) DEVICE — JELLY SURGILUBE LUBE TUBE 2OZ

## (undated) DEVICE — ELECTRODE BLADE INSULATED 1 IN

## (undated) DEVICE — PENCIL SMK EVAC CONNECTOR 10FT

## (undated) DEVICE — ELECTRODE RET DUAL PLATE CORD

## (undated) DEVICE — SLEEVE SCD EXPRESS KNEE MEDIUM

## (undated) DEVICE — NDL SAFETY 21G X 1 1/2 ECLPSE

## (undated) DEVICE — SOL NACL IRR 1000ML BTL

## (undated) DEVICE — ELECTRODE BLADE TEFLON 6

## (undated) DEVICE — GOWN POLY REINF BRTH SLV XL

## (undated) DEVICE — LINER SUCTION 3000CC

## (undated) DEVICE — BRIEF MESH LARGE

## (undated) DEVICE — SCRUB 10% POVIDONE IODINE 4OZ

## (undated) DEVICE — DRAPE MINOR FEN 98X77X121IN